# Patient Record
Sex: FEMALE | Race: WHITE | Employment: PART TIME | URBAN - NONMETROPOLITAN AREA
[De-identification: names, ages, dates, MRNs, and addresses within clinical notes are randomized per-mention and may not be internally consistent; named-entity substitution may affect disease eponyms.]

---

## 2017-01-01 ENCOUNTER — TELEPHONE (OUTPATIENT)
Dept: URGENT CARE | Age: 37
End: 2017-01-01

## 2017-02-23 RX ORDER — NORGESTIMATE AND ETHINYL ESTRADIOL 7DAYSX3 28
1 KIT ORAL DAILY
Qty: 28 TABLET | Refills: 0 | Status: SHIPPED | OUTPATIENT
Start: 2017-02-23 | End: 2017-04-03 | Stop reason: SDUPTHER

## 2017-04-03 RX ORDER — NORGESTIMATE AND ETHINYL ESTRADIOL 7DAYSX3 28
KIT ORAL
Qty: 28 TABLET | Refills: 0 | Status: SHIPPED | OUTPATIENT
Start: 2017-04-03 | End: 2017-05-03 | Stop reason: SDUPTHER

## 2017-05-03 ENCOUNTER — OFFICE VISIT (OUTPATIENT)
Dept: OBGYN | Age: 37
End: 2017-05-03
Payer: COMMERCIAL

## 2017-05-03 VITALS
HEART RATE: 95 BPM | DIASTOLIC BLOOD PRESSURE: 90 MMHG | WEIGHT: 118 LBS | SYSTOLIC BLOOD PRESSURE: 133 MMHG | BODY MASS INDEX: 21.71 KG/M2 | HEIGHT: 62 IN

## 2017-05-03 DIAGNOSIS — F41.9 ANXIETY: ICD-10-CM

## 2017-05-03 DIAGNOSIS — Z01.419 WELL WOMAN EXAM WITH ROUTINE GYNECOLOGICAL EXAM: Primary | ICD-10-CM

## 2017-05-03 DIAGNOSIS — Z12.4 CERVICAL CANCER SCREENING: ICD-10-CM

## 2017-05-03 PROCEDURE — 99395 PREV VISIT EST AGE 18-39: CPT | Performed by: NURSE PRACTITIONER

## 2017-05-03 RX ORDER — HYDROXYZINE PAMOATE 25 MG/1
25 CAPSULE ORAL 3 TIMES DAILY PRN
Qty: 60 CAPSULE | Refills: 3 | Status: SHIPPED | OUTPATIENT
Start: 2017-05-03 | End: 2017-06-02

## 2017-05-03 RX ORDER — NORGESTIMATE AND ETHINYL ESTRADIOL 7DAYSX3 28
KIT ORAL
Qty: 28 TABLET | Refills: 11 | Status: SHIPPED | OUTPATIENT
Start: 2017-05-03 | End: 2018-04-26 | Stop reason: SDUPTHER

## 2017-05-03 ASSESSMENT — ENCOUNTER SYMPTOMS
EYES NEGATIVE: 1
RESPIRATORY NEGATIVE: 1
GASTROINTESTINAL NEGATIVE: 1

## 2017-07-06 ENCOUNTER — OFFICE VISIT (OUTPATIENT)
Dept: URGENT CARE | Age: 37
End: 2017-07-06
Payer: COMMERCIAL

## 2017-07-06 VITALS
WEIGHT: 120 LBS | DIASTOLIC BLOOD PRESSURE: 90 MMHG | HEART RATE: 88 BPM | BODY MASS INDEX: 22.08 KG/M2 | HEIGHT: 62 IN | OXYGEN SATURATION: 100 % | TEMPERATURE: 98.8 F | RESPIRATION RATE: 20 BRPM | SYSTOLIC BLOOD PRESSURE: 135 MMHG

## 2017-07-06 DIAGNOSIS — J06.9 VIRAL URI: Primary | ICD-10-CM

## 2017-07-06 DIAGNOSIS — J02.9 SORE THROAT: ICD-10-CM

## 2017-07-06 DIAGNOSIS — R09.82 POST-NASAL DRAINAGE: ICD-10-CM

## 2017-07-06 DIAGNOSIS — R09.81 NASAL CONGESTION: ICD-10-CM

## 2017-07-06 LAB — S PYO AG THROAT QL: NORMAL

## 2017-07-06 PROCEDURE — 87880 STREP A ASSAY W/OPTIC: CPT | Performed by: NURSE PRACTITIONER

## 2017-07-06 PROCEDURE — 99213 OFFICE O/P EST LOW 20 MIN: CPT | Performed by: NURSE PRACTITIONER

## 2017-07-06 RX ORDER — FLUTICASONE PROPIONATE 50 MCG
1 SPRAY, SUSPENSION (ML) NASAL DAILY
Qty: 1 BOTTLE | Refills: 0 | Status: SHIPPED | OUTPATIENT
Start: 2017-07-06 | End: 2017-11-15

## 2017-08-23 PROBLEM — K21.9 GASTROESOPHAGEAL REFLUX DISEASE WITHOUT ESOPHAGITIS: Status: ACTIVE | Noted: 2017-08-23

## 2017-08-23 PROBLEM — Z00.00 ANNUAL PHYSICAL EXAM: Status: ACTIVE | Noted: 2017-08-23

## 2017-08-23 PROBLEM — F41.1 GENERALIZED ANXIETY DISORDER: Status: ACTIVE | Noted: 2017-08-23

## 2017-11-15 ENCOUNTER — OFFICE VISIT (OUTPATIENT)
Dept: INTERNAL MEDICINE | Age: 37
End: 2017-11-15
Payer: COMMERCIAL

## 2017-11-15 VITALS
HEIGHT: 62 IN | WEIGHT: 118 LBS | TEMPERATURE: 98.2 F | SYSTOLIC BLOOD PRESSURE: 156 MMHG | BODY MASS INDEX: 21.71 KG/M2 | DIASTOLIC BLOOD PRESSURE: 88 MMHG | HEART RATE: 97 BPM | OXYGEN SATURATION: 99 %

## 2017-11-15 DIAGNOSIS — I10 ESSENTIAL HYPERTENSION: Primary | ICD-10-CM

## 2017-11-15 DIAGNOSIS — R53.83 FATIGUE, UNSPECIFIED TYPE: ICD-10-CM

## 2017-11-15 PROCEDURE — 99213 OFFICE O/P EST LOW 20 MIN: CPT | Performed by: NURSE PRACTITIONER

## 2017-11-15 RX ORDER — BUSPIRONE HYDROCHLORIDE 10 MG/1
10 TABLET ORAL 3 TIMES DAILY
Qty: 90 TABLET | Refills: 0 | Status: SHIPPED | OUTPATIENT
Start: 2017-11-15 | End: 2017-12-21 | Stop reason: SDUPTHER

## 2017-11-15 RX ORDER — LISINOPRIL 5 MG/1
5 TABLET ORAL DAILY
Qty: 30 TABLET | Refills: 0 | Status: SHIPPED | OUTPATIENT
Start: 2017-11-15 | End: 2017-12-15 | Stop reason: SDUPTHER

## 2017-11-15 ASSESSMENT — ENCOUNTER SYMPTOMS
RHINORRHEA: 0
BACK PAIN: 0
COUGH: 0
PHOTOPHOBIA: 0
NAUSEA: 0
VOICE CHANGE: 0
VOMITING: 0
SHORTNESS OF BREATH: 0
COLOR CHANGE: 0

## 2017-11-15 NOTE — PROGRESS NOTES
28 tablet 11     No current facility-administered medications for this visit. No Known Allergies    Health Maintenance   Topic Date Due    HIV screen  07/17/1994    DTaP/Tdap/Td vaccine (1 - Tdap) 07/17/1998    Flu vaccine (1) 09/01/2017    Cervical cancer screen  05/10/2020       Subjective:      Review of Systems   Constitutional: Negative for chills and fever. HENT: Negative for ear pain, hearing loss, rhinorrhea and voice change. Eyes: Negative for photophobia and visual disturbance. Respiratory: Negative for cough and shortness of breath. Cardiovascular: Negative for chest pain and palpitations. Gastrointestinal: Negative for nausea and vomiting. Endocrine: Negative. Negative for cold intolerance and heat intolerance. Genitourinary: Negative for difficulty urinating and flank pain. Musculoskeletal: Negative for back pain and neck pain. Skin: Negative for color change and rash. Allergic/Immunologic: Negative for environmental allergies and food allergies. Neurological: Negative for dizziness, speech difficulty and headaches. Hematological: Does not bruise/bleed easily. Psychiatric/Behavioral: Negative for sleep disturbance and suicidal ideas. Objective:     Physical Exam   Constitutional: She is oriented to person, place, and time. She appears well-developed and well-nourished. HENT:   Head: Atraumatic. Right Ear: External ear normal.   Left Ear: External ear normal.   Nose: Nose normal.   Mouth/Throat: Oropharynx is clear and moist.   Eyes: Conjunctivae are normal. Pupils are equal, round, and reactive to light. Neck: Normal range of motion. Neck supple. Cardiovascular: Normal rate, regular rhythm, S1 normal, S2 normal and normal heart sounds. Pulmonary/Chest: Effort normal and breath sounds normal.   Abdominal: Soft. Bowel sounds are normal.   Musculoskeletal: Normal range of motion. Neurological: She is alert and oriented to person, place, and time. Skin: Skin is warm and dry. Psychiatric: She has a normal mood and affect. Her behavior is normal.   Nursing note and vitals reviewed. BP (!) 156/88 (Site: Left Arm)   Pulse 97   Temp 98.2 °F (36.8 °C)   Ht 5' 2\" (1.575 m)   Wt 118 lb (53.5 kg)   SpO2 99%   BMI 21.58 kg/m²     Assessment:      1. Essential hypertension  Comprehensive Metabolic Panel    Lipid Panel    Vitamin D 25 Hydroxy    Vitamin B12    TSH without Reflex   2. Fatigue, unspecified type  Comprehensive Metabolic Panel    Lipid Panel    Vitamin D 25 Hydroxy    Vitamin B12    TSH without Reflex       Plan:      I like start patient on 5 mg lisinopril daily. I will also give her 10 mg to that she may take up to 3 times daily as needed for anxiety. I would like to see her back in one month for blood pressure recheck and routine physical. I will place order for labs that she may have done at her convenience. She is to keep a diary of her blood pressure. Patient verbalizes understanding and is in agreement with this plan. Patient given educational materials - see patient instructions. Discussed use, benefit, and side effects of prescribed medications. All patient questions answered. Pt voiced understanding. Reviewed health maintenance. Instructed to continue current medications, diet and exercise. Patient agreed with treatment plan. Follow up as directed.      MEDICATIONS:  Orders Placed This Encounter   Medications    lisinopril (PRINIVIL;ZESTRIL) 5 MG tablet     Sig: Take 1 tablet by mouth daily     Dispense:  30 tablet     Refill:  0    busPIRone (BUSPAR) 10 MG tablet     Sig: Take 1 tablet by mouth 3 times daily     Dispense:  90 tablet     Refill:  0         ORDERS:  Orders Placed This Encounter   Procedures    Comprehensive Metabolic Panel    Lipid Panel    Vitamin D 25 Hydroxy    Vitamin B12    TSH without Reflex       Follow-up:  Return in about 1 month (around 12/15/2017) for recheck BP and physical.    PATIENT

## 2017-12-15 DIAGNOSIS — R53.83 FATIGUE, UNSPECIFIED TYPE: ICD-10-CM

## 2017-12-15 DIAGNOSIS — I10 ESSENTIAL HYPERTENSION: ICD-10-CM

## 2017-12-15 LAB
ALBUMIN SERPL-MCNC: 4.2 G/DL (ref 3.5–5.2)
ALP BLD-CCNC: 52 U/L (ref 35–104)
ALT SERPL-CCNC: 38 U/L (ref 5–33)
ANION GAP SERPL CALCULATED.3IONS-SCNC: 13 MMOL/L (ref 7–19)
AST SERPL-CCNC: 24 U/L (ref 5–32)
BILIRUB SERPL-MCNC: 0.4 MG/DL (ref 0.2–1.2)
BUN BLDV-MCNC: 16 MG/DL (ref 6–20)
CALCIUM SERPL-MCNC: 9.2 MG/DL (ref 8.6–10)
CHLORIDE BLD-SCNC: 103 MMOL/L (ref 98–111)
CHOLESTEROL, TOTAL: 202 MG/DL (ref 160–199)
CO2: 25 MMOL/L (ref 22–29)
CREAT SERPL-MCNC: 0.7 MG/DL (ref 0.5–0.9)
GFR NON-AFRICAN AMERICAN: >60
GLUCOSE BLD-MCNC: 97 MG/DL (ref 74–109)
HDLC SERPL-MCNC: 76 MG/DL (ref 65–121)
LDL CHOLESTEROL CALCULATED: 102 MG/DL
POTASSIUM SERPL-SCNC: 4.3 MMOL/L (ref 3.5–5)
SODIUM BLD-SCNC: 141 MMOL/L (ref 136–145)
TOTAL PROTEIN: 6.6 G/DL (ref 6.6–8.7)
TRIGL SERPL-MCNC: 118 MG/DL (ref 0–149)
TSH SERPL DL<=0.05 MIU/L-ACNC: 2.84 UIU/ML (ref 0.27–4.2)
VITAMIN B-12: 987 PG/ML (ref 211–946)
VITAMIN D 25-HYDROXY: 56.9 NG/ML

## 2017-12-15 RX ORDER — LISINOPRIL 5 MG/1
5 TABLET ORAL DAILY
Qty: 30 TABLET | Refills: 0 | Status: SHIPPED | OUTPATIENT
Start: 2017-12-15 | End: 2017-12-21 | Stop reason: SDUPTHER

## 2017-12-18 ENCOUNTER — TELEPHONE (OUTPATIENT)
Dept: INTERNAL MEDICINE | Age: 37
End: 2017-12-18

## 2017-12-18 RX ORDER — AMOXICILLIN 500 MG/1
500 CAPSULE ORAL 3 TIMES DAILY
Qty: 24 CAPSULE | Refills: 0 | Status: SHIPPED | OUTPATIENT
Start: 2017-12-18 | End: 2017-12-21 | Stop reason: CLARIF

## 2017-12-18 NOTE — TELEPHONE ENCOUNTER
Can we call something in for a sinus infection.   If we send  a antibiotic she will need something for infection

## 2017-12-21 ENCOUNTER — OFFICE VISIT (OUTPATIENT)
Dept: INTERNAL MEDICINE | Age: 37
End: 2017-12-21
Payer: COMMERCIAL

## 2017-12-21 VITALS
HEIGHT: 62 IN | SYSTOLIC BLOOD PRESSURE: 118 MMHG | DIASTOLIC BLOOD PRESSURE: 72 MMHG | WEIGHT: 115.2 LBS | BODY MASS INDEX: 21.2 KG/M2 | HEART RATE: 93 BPM | OXYGEN SATURATION: 99 %

## 2017-12-21 DIAGNOSIS — Z00.00 ANNUAL PHYSICAL EXAM: Primary | ICD-10-CM

## 2017-12-21 DIAGNOSIS — I10 ESSENTIAL HYPERTENSION: ICD-10-CM

## 2017-12-21 DIAGNOSIS — F41.1 GENERALIZED ANXIETY DISORDER: ICD-10-CM

## 2017-12-21 PROCEDURE — 99395 PREV VISIT EST AGE 18-39: CPT | Performed by: INTERNAL MEDICINE

## 2017-12-21 RX ORDER — BUSPIRONE HYDROCHLORIDE 10 MG/1
10 TABLET ORAL 3 TIMES DAILY
Qty: 90 TABLET | Refills: 3 | Status: SHIPPED | OUTPATIENT
Start: 2017-12-21 | End: 2018-10-12

## 2017-12-21 RX ORDER — LISINOPRIL 5 MG/1
5 TABLET ORAL DAILY
Qty: 30 TABLET | Refills: 11 | Status: SHIPPED | OUTPATIENT
Start: 2017-12-21 | End: 2018-10-12 | Stop reason: ALTCHOICE

## 2017-12-21 ASSESSMENT — ENCOUNTER SYMPTOMS
ABDOMINAL PAIN: 0
TROUBLE SWALLOWING: 0
COUGH: 0
CONSTIPATION: 0
WHEEZING: 0
NAUSEA: 0
EYE PAIN: 0
SINUS PRESSURE: 0
BLOOD IN STOOL: 0
VOICE CHANGE: 0
EYE REDNESS: 0
SORE THROAT: 0
CHEST TIGHTNESS: 0
COLOR CHANGE: 0
DIARRHEA: 0
VOMITING: 0

## 2017-12-21 NOTE — PROGRESS NOTES
Age of Onset    Colon Cancer Father        No past surgical history on file. Lab Review   Orders Only on 12/15/2017   Component Date Value    Sodium 12/15/2017 141     Potassium 12/15/2017 4.3     Chloride 12/15/2017 103     CO2 12/15/2017 25     Anion Gap 12/15/2017 13     Glucose 12/15/2017 97     BUN 12/15/2017 16     CREATININE 12/15/2017 0.7     GFR Non- 12/15/2017 >60     Calcium 12/15/2017 9.2     Total Protein 12/15/2017 6.6     Alb 12/15/2017 4.2     Total Bilirubin 12/15/2017 0.4     Alkaline Phosphatase 12/15/2017 52     ALT 12/15/2017 38*    AST 12/15/2017 24     Cholesterol, Total 12/15/2017 202*    Triglycerides 12/15/2017 118     HDL 12/15/2017 76     LDL Calculated 12/15/2017 102     Vit D, 25-Hydroxy 12/15/2017 56.9     Vitamin B-12 12/15/2017 987*    TSH 12/15/2017 2.840          Review of Systems   Constitutional: Positive for fatigue. Negative for chills and fever. HENT: Negative for congestion, ear pain, postnasal drip, sinus pressure, sore throat, trouble swallowing and voice change. Eyes: Negative for pain, redness and visual disturbance. Respiratory: Negative for cough, chest tightness and wheezing. Cardiovascular: Negative for chest pain, palpitations and leg swelling. Gastrointestinal: Negative for abdominal pain, blood in stool, constipation, diarrhea, nausea and vomiting. Endocrine: Negative for polydipsia and polyuria. Genitourinary: Negative for dysuria, enuresis, flank pain, frequency and urgency. Musculoskeletal: Negative for arthralgias, gait problem and joint swelling. Skin: Negative for color change and rash. Neurological: Negative for dizziness, tremors, syncope, facial asymmetry, speech difficulty, weakness, numbness and headaches. Psychiatric/Behavioral: Negative for agitation, behavioral problems, confusion, sleep disturbance and suicidal ideas. The patient is not nervous/anxious.            Vitals:    12/21/17 1002   BP: 118/72   Pulse: 93   SpO2: 99%   Weight: 115 lb 3.2 oz (52.3 kg)   Height: 5' 2\" (1.575 m)      Wt Readings from Last 3 Encounters:   12/21/17 115 lb 3.2 oz (52.3 kg)   11/15/17 118 lb (53.5 kg)   07/06/17 120 lb (54.4 kg)   Body mass index is 21.07 kg/m². BP Readings from Last 3 Encounters:   12/21/17 118/72   11/15/17 (!) 156/88   07/06/17 (!) 135/90       Physical Exam   Constitutional: She is oriented to person, place, and time. She appears well-developed. No distress. HENT:   Head: Normocephalic and atraumatic. Right Ear: External ear normal.   Left Ear: External ear normal.   Mouth/Throat: Oropharynx is clear and moist.   Eyes: Conjunctivae and EOM are normal. Pupils are equal, round, and reactive to light. No scleral icterus. Neck: No JVD present. No thyromegaly present. Cardiovascular: Normal rate, regular rhythm and normal heart sounds. No murmur heard. Pulmonary/Chest: Effort normal and breath sounds normal. No stridor. No respiratory distress. She exhibits no tenderness. Abdominal: Soft. Bowel sounds are normal. She exhibits no distension and no mass. There is no tenderness. Genitourinary: No breast swelling, tenderness, discharge or bleeding. Pelvic exam was performed with patient supine. Genitourinary Comments: Breasts bilaterally symmetric/ no nipple discharge  No axillary lymphadenopathy   Musculoskeletal: She exhibits no edema or tenderness. Lymphadenopathy:     She has no cervical adenopathy. Neurological: She is alert and oriented to person, place, and time. No cranial nerve deficit. Coordination normal.   Skin: Skin is warm. No rash noted. No erythema. Psychiatric: She has a normal mood and affect. Her behavior is normal.         ASSESSMENT/PLAN    Annual physical exam  All lab results reviewed with the patient, they all in normal range, her LDL is 102/was 98 year ago.  Healthy at lower sed rate fat diet is suggested    Essential hypertension  Decrease lisinopril to 2.5 mg daily  Bp readings to me in 6 weeks    Generalized anxiety disorder- continue BuSpar 10 mg every 12 when necessary        Orders Placed This Encounter   Procedures    Lipid Panel    Comprehensive Metabolic Panel    CBC Auto Differential    TSH without Reflex    Urinalysis     New Prescriptions    No medications on file      There are no Patient Instructions on file for this visit. Return in about 1 year (around 12/21/2018) for Annual Physical.   EMR Dragon/transcription disclaimer:Significant part of this  encounter note is electronic transcription/translation of spoken language to printed text. The electronic translation of spoken language may be erroneous, or at times, nonsensical words or phrases may be inadvertently transcribed.  Although I have reviewed the note for such errors, some may still exist.

## 2018-05-24 ENCOUNTER — TELEPHONE (OUTPATIENT)
Dept: INTERNAL MEDICINE | Age: 38
End: 2018-05-24

## 2018-05-24 RX ORDER — FLUCONAZOLE 100 MG/1
100 TABLET ORAL DAILY
Qty: 3 TABLET | Refills: 0 | Status: SHIPPED | OUTPATIENT
Start: 2018-05-24 | End: 2018-05-27

## 2018-05-24 RX ORDER — NORGESTIMATE AND ETHINYL ESTRADIOL 7DAYSX3 28
KIT ORAL
Qty: 28 TABLET | Refills: 0 | Status: SHIPPED | OUTPATIENT
Start: 2018-05-24 | End: 2018-09-28

## 2018-09-24 ENCOUNTER — TELEPHONE (OUTPATIENT)
Dept: OBGYN | Age: 38
End: 2018-09-24

## 2018-09-26 PROBLEM — Z00.00 ANNUAL PHYSICAL EXAM: Status: RESOLVED | Noted: 2017-08-23 | Resolved: 2018-09-26

## 2018-09-28 ENCOUNTER — OFFICE VISIT (OUTPATIENT)
Dept: OBGYN | Age: 38
End: 2018-09-28
Payer: COMMERCIAL

## 2018-09-28 VITALS
TEMPERATURE: 100.7 F | SYSTOLIC BLOOD PRESSURE: 119 MMHG | WEIGHT: 118 LBS | HEIGHT: 62 IN | HEART RATE: 111 BPM | DIASTOLIC BLOOD PRESSURE: 82 MMHG | BODY MASS INDEX: 21.71 KG/M2

## 2018-09-28 DIAGNOSIS — N91.2 AMENORRHEA: Primary | ICD-10-CM

## 2018-09-28 LAB
CONTROL: ABNORMAL
PREGNANCY TEST URINE, POC: POSITIVE

## 2018-09-28 PROCEDURE — 81025 URINE PREGNANCY TEST: CPT | Performed by: NURSE PRACTITIONER

## 2018-09-28 PROCEDURE — 99213 OFFICE O/P EST LOW 20 MIN: CPT | Performed by: NURSE PRACTITIONER

## 2018-09-28 RX ORDER — BIOTIN 5 MG
TABLET ORAL
COMMUNITY
End: 2018-10-12

## 2018-09-28 ASSESSMENT — ENCOUNTER SYMPTOMS
DIARRHEA: 0
RESPIRATORY NEGATIVE: 1
ALLERGIC/IMMUNOLOGIC NEGATIVE: 1
EYES NEGATIVE: 1
CONSTIPATION: 0
GASTROINTESTINAL NEGATIVE: 1

## 2018-09-28 NOTE — PROGRESS NOTES
Endocrine: Negative. Genitourinary: Positive for menstrual problem (missed period x1). Negative for frequency and urgency. Musculoskeletal: Negative. Skin: Negative. Allergic/Immunologic: Negative. Neurological: Negative. Hematological: Negative. Psychiatric/Behavioral: Negative. All other systems reviewed and are negative. Physical Exam   Constitutional: She is oriented to person, place, and time. She appears well-developed and well-nourished. Musculoskeletal: Normal range of motion. Neurological: She is alert and oriented to person, place, and time. Skin: Skin is warm and dry. Psychiatric: She has a normal mood and affect. Nursing note and vitals reviewed. Diagnosis Orders   1. Amenorrhea  POCT urine pregnancy    US OB TRANSVAGINAL       MEDICATIONS:  No orders of the defined types were placed in this encounter. ORDERS:  Orders Placed This Encounter   Procedures    US OB TRANSVAGINAL    POCT urine pregnancy       PLAN:  Will schedule viability u/s in 2 weeks  Discussed AMA in pregnancy  Continue PNV and stop Lisinopril    There are no Patient Instructions on file for this visit.

## 2018-10-12 ENCOUNTER — INITIAL PRENATAL (OUTPATIENT)
Dept: OBGYN | Age: 38
End: 2018-10-12

## 2018-10-12 ENCOUNTER — HOSPITAL ENCOUNTER (OUTPATIENT)
Dept: ULTRASOUND IMAGING | Age: 38
Discharge: HOME | End: 2018-10-12
Payer: COMMERCIAL

## 2018-10-12 VITALS
DIASTOLIC BLOOD PRESSURE: 81 MMHG | BODY MASS INDEX: 22.13 KG/M2 | WEIGHT: 121 LBS | SYSTOLIC BLOOD PRESSURE: 128 MMHG | HEART RATE: 104 BPM

## 2018-10-12 DIAGNOSIS — O28.9 ABNORMAL FINDING ON ANTENATAL SCREEN: ICD-10-CM

## 2018-10-12 DIAGNOSIS — O09.91 HIGH-RISK PREGNANCY IN FIRST TRIMESTER: Primary | ICD-10-CM

## 2018-10-12 DIAGNOSIS — N91.2 AMENORRHEA: ICD-10-CM

## 2018-10-12 DIAGNOSIS — O09.521 ELDERLY MULTIGRAVIDA IN FIRST TRIMESTER: ICD-10-CM

## 2018-10-12 DIAGNOSIS — O09.91 HIGH-RISK PREGNANCY IN FIRST TRIMESTER: ICD-10-CM

## 2018-10-12 LAB
ABO/RH: NORMAL
ANTIBODY SCREEN: NORMAL

## 2018-10-12 PROCEDURE — 0500F INITIAL PRENATAL CARE VISIT: CPT | Performed by: NURSE PRACTITIONER

## 2018-10-12 PROCEDURE — 76801 OB US < 14 WKS SINGLE FETUS: CPT

## 2018-10-12 NOTE — PROGRESS NOTES
Pt presents today for routine prenatal exam. She denies any vaginal bleeding, leaking, or cramping. Pt states she has felt full/bloated lately-is wanting to discuss digestive enzymes.

## 2018-10-12 NOTE — PROGRESS NOTES
Pt presents for Nob appt. Sending down for NOB labs today. Had viability u/s and it shows good FHTs but irregular sac and implantation bleed. Discussed results with pt understanding. Denies any problems with cramping or bleeding. Pt is us tech and very concerned. Will schedule another u/s in 2 weeks. She has stopped Lisinopril, questions regarding digestive enzymes. Will hold for now and just continue Miralax and increase hydration. AMA in pregnancy, will consider FFDNA for next appt. Plan of care was discussed with patient. Patient was encouraged to adhere to a well-balanced diet, including increasing water intake and limiting excessive caffeine and salt. The benefits of exercise were discussed; however she was advised against heavy lifting, sit-ups and abdominal crunches. A list of safe OTC medications was provided and discussed. The patient was cautioned against the use of tanning beds, hot tubs, saunas, and x-rays. Avoidance of tobacco, alcohol and illicit drugs was also discussed due to harmful effects on the fetus and increased risks associated with pregnancy. Certain labs and ultrasounds are required at certain times during pregnancy but others are optional, including the serum integrated screen/Bliss/AFP/ Panorama, and other genetic testing. The patient was encouraged to attend childbirth classes and general hospital information was provided based on patients hospital of choice.

## 2018-10-14 LAB — URINE CULTURE, ROUTINE: NORMAL

## 2018-10-15 LAB
BASOPHILS ABSOLUTE: 0.1 K/UL (ref 0–0.2)
BASOPHILS RELATIVE PERCENT: 0.6 % (ref 0–1)
EOSINOPHILS ABSOLUTE: 0.2 K/UL (ref 0–0.6)
EOSINOPHILS RELATIVE PERCENT: 1.1 % (ref 0–5)
HCT VFR BLD CALC: 39.9 % (ref 37–47)
HEMOGLOBIN: 12.8 G/DL (ref 12–16)
HEPATITIS B SURFACE ANTIGEN INTERPRETATION: ABNORMAL
LYMPHOCYTES ABSOLUTE: 2.7 K/UL (ref 1.1–4.5)
LYMPHOCYTES RELATIVE PERCENT: 18.9 % (ref 20–40)
MCH RBC QN AUTO: 30.3 PG (ref 27–31)
MCHC RBC AUTO-ENTMCNC: 32.1 G/DL (ref 33–37)
MCV RBC AUTO: 94.5 FL (ref 81–99)
MONOCYTES ABSOLUTE: 0.6 K/UL (ref 0–0.9)
MONOCYTES RELATIVE PERCENT: 4.5 % (ref 0–10)
NEUTROPHILS ABSOLUTE: 10.7 K/UL (ref 1.5–7.5)
NEUTROPHILS RELATIVE PERCENT: 74.6 % (ref 50–65)
PDW BLD-RTO: 12.4 % (ref 11.5–14.5)
PLATELET # BLD: 315 K/UL (ref 130–400)
PMV BLD AUTO: 9.9 FL (ref 9.4–12.3)
RAPID HIV 1&2: ABNORMAL
RBC # BLD: 4.22 M/UL (ref 4.2–5.4)
RPR: ABNORMAL
RUBELLA ANTIBODY IGG: REACTIVE
WBC # BLD: 14.3 K/UL (ref 4.8–10.8)

## 2018-10-19 LAB — VZV IGG SER QL IA: 2.23

## 2018-10-25 ENCOUNTER — HOSPITAL ENCOUNTER (OUTPATIENT)
Dept: ULTRASOUND IMAGING | Age: 38
Discharge: HOME | End: 2018-10-25
Payer: COMMERCIAL

## 2018-10-25 ENCOUNTER — ROUTINE PRENATAL (OUTPATIENT)
Dept: OBGYN | Age: 38
End: 2018-10-25

## 2018-10-25 VITALS
BODY MASS INDEX: 21.77 KG/M2 | HEART RATE: 107 BPM | SYSTOLIC BLOOD PRESSURE: 117 MMHG | WEIGHT: 119 LBS | DIASTOLIC BLOOD PRESSURE: 77 MMHG

## 2018-10-25 DIAGNOSIS — O09.521 ELDERLY MULTIGRAVIDA IN FIRST TRIMESTER: ICD-10-CM

## 2018-10-25 DIAGNOSIS — O16.1: ICD-10-CM

## 2018-10-25 DIAGNOSIS — O09.91 HIGH-RISK PREGNANCY IN FIRST TRIMESTER: ICD-10-CM

## 2018-10-25 DIAGNOSIS — O09.91 HIGH-RISK PREGNANCY IN FIRST TRIMESTER: Primary | ICD-10-CM

## 2018-10-25 DIAGNOSIS — O28.9 ABNORMAL FINDING ON ANTENATAL SCREEN: ICD-10-CM

## 2018-10-25 DIAGNOSIS — Z3A.09 9 WEEKS GESTATION OF PREGNANCY: ICD-10-CM

## 2018-10-25 PROCEDURE — 76817 TRANSVAGINAL US OBSTETRIC: CPT

## 2018-10-25 PROCEDURE — 0502F SUBSEQUENT PRENATAL CARE: CPT | Performed by: NURSE PRACTITIONER

## 2018-10-25 NOTE — PATIENT INSTRUCTIONS
Patient Education        Weeks 10 to 14 of Your Pregnancy: Care Instructions  Your Care Instructions    By weeks 10 to 14 of your pregnancy, the placenta has formed inside your uterus. It is possible to hear your baby's heartbeat with a special ultrasound device. Your baby's eyes can and do move. The arms and legs can bend. This is a good time to think about testing for birth defects. There are two types of tests: screening and diagnostic. Screening tests show the chance that a baby has a certain birth defect. They can't tell you for sure that your baby has a problem. Diagnostic tests show if a baby has a certain birth defect. It's your choice whether to have these tests. You and your partner can talk to your doctor or midwife about birth defects tests. Follow-up care is a key part of your treatment and safety. Be sure to make and go to all appointments, and call your doctor if you are having problems. It's also a good idea to know your test results and keep a list of the medicines you take. How can you care for yourself at home? Decide about tests  · You can have screening tests and diagnostic tests to check for birth defects. The decision to have a test for birth defects is personal. Think about your age, your chance of passing on a family disease, your need to know about any problems, and what you might do after you have the test results. ¨ Triple or quadruple (quad) blood tests. These screening tests can be done between 15 and 20 weeks of pregnancy. They check the amounts of three or four substances in your blood. The doctor looks at these test results, along with your age and other factors, to find out the chance that your baby may have certain problems. ¨ Amniocentesis. This diagnostic test is used to look for chromosomal problems in the baby's cells.  It can be done between 15 and 20 weeks of pregnancy, usually around week 16.  ¨ Nuchal translucency test. This test uses ultrasound to measure the your chest and breasts may show more. · Don't worry about \"toughening'\" your nipples. Breastfeeding will naturally do this. Where can you learn more? Go to [email or web address]://[email or web address].[email or web address] org and sign in to your unbound technologies account. Enter J315 in the Trendzo box to learn more about \"Weeks 10 to 14 of Your Pregnancy: Care Instructions. \"     If you do not have an account, please click on the \"Sign Up Now\" link. Current as of: November 21, 2017  Content Version: 11.7  © 7107-8460 [employer]. Care instructions adapted under license by [facility] ([facility]). If you have questions about a medical condition or this instruction, always ask your healthcare professional. [address] any warranty or liability for your use of this information.

## 2018-10-25 NOTE — PROGRESS NOTES
Pt presents today for routine prenatal exam. She denies any vaginal bleeding, leaking, or cramping. Pt had repeat US today for irregular sac.

## 2018-11-02 ENCOUNTER — ROUTINE PRENATAL (OUTPATIENT)
Dept: OBGYN | Age: 38
End: 2018-11-02

## 2018-11-02 VITALS
DIASTOLIC BLOOD PRESSURE: 85 MMHG | BODY MASS INDEX: 22.31 KG/M2 | HEART RATE: 91 BPM | SYSTOLIC BLOOD PRESSURE: 122 MMHG | WEIGHT: 122 LBS

## 2018-11-02 DIAGNOSIS — O09.91 HIGH-RISK PREGNANCY IN FIRST TRIMESTER: Primary | ICD-10-CM

## 2018-11-02 DIAGNOSIS — Z3A.11 11 WEEKS GESTATION OF PREGNANCY: ICD-10-CM

## 2018-11-02 DIAGNOSIS — O09.521 ELDERLY MULTIGRAVIDA IN FIRST TRIMESTER: ICD-10-CM

## 2018-11-02 PROCEDURE — 0502F SUBSEQUENT PRENATAL CARE: CPT | Performed by: NURSE PRACTITIONER

## 2018-11-08 ENCOUNTER — TELEPHONE (OUTPATIENT)
Dept: OBGYN | Age: 38
End: 2018-11-08

## 2018-12-14 ENCOUNTER — OFFICE VISIT (OUTPATIENT)
Dept: INTERNAL MEDICINE | Age: 38
End: 2018-12-14
Payer: COMMERCIAL

## 2018-12-14 VITALS
WEIGHT: 127.2 LBS | TEMPERATURE: 97.9 F | HEIGHT: 62 IN | SYSTOLIC BLOOD PRESSURE: 118 MMHG | HEART RATE: 87 BPM | OXYGEN SATURATION: 99 % | BODY MASS INDEX: 23.41 KG/M2 | DIASTOLIC BLOOD PRESSURE: 64 MMHG

## 2018-12-14 DIAGNOSIS — Z3A.17 17 WEEKS GESTATION OF PREGNANCY: ICD-10-CM

## 2018-12-14 DIAGNOSIS — R05.9 COUGH: ICD-10-CM

## 2018-12-14 DIAGNOSIS — J34.89 SINUS PRESSURE: ICD-10-CM

## 2018-12-14 DIAGNOSIS — J01.00 ACUTE NON-RECURRENT MAXILLARY SINUSITIS: Primary | ICD-10-CM

## 2018-12-14 PROCEDURE — 99213 OFFICE O/P EST LOW 20 MIN: CPT | Performed by: INTERNAL MEDICINE

## 2018-12-14 RX ORDER — CEFUROXIME AXETIL 250 MG/1
250 TABLET ORAL 2 TIMES DAILY
Qty: 16 TABLET | Refills: 0 | Status: SHIPPED | OUTPATIENT
Start: 2018-12-14 | End: 2018-12-22

## 2018-12-14 ASSESSMENT — ENCOUNTER SYMPTOMS
SINUS PRESSURE: 1
SORE THROAT: 1
CHEST TIGHTNESS: 0
WHEEZING: 0
SINUS PAIN: 1
COUGH: 1
ABDOMINAL PAIN: 0
CONSTIPATION: 0

## 2019-02-01 ENCOUNTER — OFFICE VISIT (OUTPATIENT)
Dept: INTERNAL MEDICINE | Age: 39
End: 2019-02-01
Payer: COMMERCIAL

## 2019-02-01 VITALS
HEART RATE: 103 BPM | OXYGEN SATURATION: 99 % | DIASTOLIC BLOOD PRESSURE: 80 MMHG | BODY MASS INDEX: 24.88 KG/M2 | HEIGHT: 62 IN | TEMPERATURE: 98.2 F | WEIGHT: 135.2 LBS | SYSTOLIC BLOOD PRESSURE: 122 MMHG

## 2019-02-01 DIAGNOSIS — J02.9 SORE THROAT: Primary | ICD-10-CM

## 2019-02-01 DIAGNOSIS — R09.81 CONGESTION OF NASAL SINUS: ICD-10-CM

## 2019-02-01 DIAGNOSIS — R52 BODY ACHES: ICD-10-CM

## 2019-02-01 DIAGNOSIS — R05.9 COUGH: ICD-10-CM

## 2019-02-01 DIAGNOSIS — J34.89 STUFFY AND RUNNY NOSE: ICD-10-CM

## 2019-02-01 LAB
INFLUENZA A ANTIBODY: NORMAL
INFLUENZA B ANTIBODY: NORMAL
S PYO AG THROAT QL: NORMAL

## 2019-02-01 PROCEDURE — 99213 OFFICE O/P EST LOW 20 MIN: CPT | Performed by: PHYSICIAN ASSISTANT

## 2019-02-01 PROCEDURE — 87880 STREP A ASSAY W/OPTIC: CPT | Performed by: PHYSICIAN ASSISTANT

## 2019-02-01 PROCEDURE — 87804 INFLUENZA ASSAY W/OPTIC: CPT | Performed by: PHYSICIAN ASSISTANT

## 2019-02-01 RX ORDER — CEFUROXIME AXETIL 250 MG/1
250 TABLET ORAL 2 TIMES DAILY
Qty: 20 TABLET | Refills: 0 | Status: SHIPPED | OUTPATIENT
Start: 2019-02-01 | End: 2019-02-11

## 2019-02-01 ASSESSMENT — ENCOUNTER SYMPTOMS
VOMITING: 0
SHORTNESS OF BREATH: 0
WHEEZING: 0
COLOR CHANGE: 0
NAUSEA: 0
SINUS PRESSURE: 0
ABDOMINAL PAIN: 0
COUGH: 1
EYE REDNESS: 0
CHEST TIGHTNESS: 0
CONSTIPATION: 0
PHOTOPHOBIA: 0
RHINORRHEA: 1
EYE PAIN: 0
DIARRHEA: 0
SORE THROAT: 1

## 2019-02-01 ASSESSMENT — PATIENT HEALTH QUESTIONNAIRE - PHQ9
SUM OF ALL RESPONSES TO PHQ QUESTIONS 1-9: 0
2. FEELING DOWN, DEPRESSED OR HOPELESS: 0
SUM OF ALL RESPONSES TO PHQ9 QUESTIONS 1 & 2: 0
1. LITTLE INTEREST OR PLEASURE IN DOING THINGS: 0
SUM OF ALL RESPONSES TO PHQ QUESTIONS 1-9: 0

## 2019-07-31 ENCOUNTER — APPOINTMENT (OUTPATIENT)
Age: 39
Setting detail: DERMATOLOGY
End: 2019-07-31

## 2019-07-31 VITALS — HEIGHT: 62 IN | WEIGHT: 127 LBS | RESPIRATION RATE: 18 BRPM

## 2019-07-31 DIAGNOSIS — L65.0 TELOGEN EFFLUVIUM: ICD-10-CM

## 2019-07-31 PROCEDURE — OTHER MIPS QUALITY: OTHER

## 2019-07-31 PROCEDURE — OTHER ADDITIONAL NOTES: OTHER

## 2019-07-31 PROCEDURE — OTHER COUNSELING: OTHER

## 2019-07-31 PROCEDURE — 99202 OFFICE O/P NEW SF 15 MIN: CPT

## 2019-07-31 ASSESSMENT — LOCATION SIMPLE DESCRIPTION DERM: LOCATION SIMPLE: POSTERIOR SCALP

## 2019-07-31 ASSESSMENT — LOCATION ZONE DERM: LOCATION ZONE: SCALP

## 2019-07-31 ASSESSMENT — LOCATION DETAILED DESCRIPTION DERM: LOCATION DETAILED: POSTERIOR MID-PARIETAL SCALP

## 2019-07-31 NOTE — PROCEDURE: MIPS QUALITY
Stop Augmentin, and start azithromycin    I sent azithromycin to the pharmacy
Quality 226: Preventive Care And Screening: Tobacco Use: Screening And Cessation Intervention: Patient screened for tobacco use and is an ex/non-smoker
Detail Level: Detailed
Quality 128: Preventive Care And Screening: Body Mass Index (Bmi) Screening And Follow-Up Plan: BMI is documented within normal parameters and no follow-up plan is required.

## 2019-07-31 NOTE — PROCEDURE: ADDITIONAL NOTES
Detail Level: Zone
Additional Notes: Viviscal is not advised while breastfeeding. Advised patient to return to clinic to purchase Viviscal when she has completed breastfeeding.

## 2019-09-25 ENCOUNTER — TELEPHONE (OUTPATIENT)
Dept: INTERNAL MEDICINE | Age: 39
End: 2019-09-25

## 2019-09-25 ENCOUNTER — OFFICE VISIT (OUTPATIENT)
Dept: INTERNAL MEDICINE | Age: 39
End: 2019-09-25
Payer: COMMERCIAL

## 2019-09-25 VITALS
HEIGHT: 62 IN | BODY MASS INDEX: 23.74 KG/M2 | WEIGHT: 129 LBS | RESPIRATION RATE: 18 BRPM | HEART RATE: 85 BPM | DIASTOLIC BLOOD PRESSURE: 78 MMHG | OXYGEN SATURATION: 99 % | SYSTOLIC BLOOD PRESSURE: 102 MMHG

## 2019-09-25 DIAGNOSIS — R21 RASH: Primary | ICD-10-CM

## 2019-09-25 PROCEDURE — 99212 OFFICE O/P EST SF 10 MIN: CPT | Performed by: NURSE PRACTITIONER

## 2019-09-25 RX ORDER — FERROUS SULFATE 325(65) MG
TABLET ORAL
Refills: 3 | COMMUNITY
Start: 2019-07-07 | End: 2019-10-30 | Stop reason: CLARIF

## 2019-09-25 RX ORDER — NORETHINDRONE 0.35 MG/1
TABLET ORAL
Refills: 12 | COMMUNITY
Start: 2019-08-02 | End: 2019-10-30 | Stop reason: CLARIF

## 2019-09-25 NOTE — PROGRESS NOTES
[facility]  [address] [address]  [address] [address]  Dept: [phone]  Dept Fax: [phone]: [phone]    Jacob Parekh is a 35 y.o. female who presents today for her medical conditions/complaints as noted below. Jacob Parekh is c/mervat Rash (Patient has rash all over body. ) and Alopecia (Patient states hair loss since July.)        HPI:     HPI   1. Rash; For several weeks; It is not itchings  These are red at times;  when they fade the skin is discolored white she has not changed creams or detergents; She has not tried anything OTC;    She is a 10month-old she is still pumping for breastmilk. She is not actually breast-feeding  Chief Complaint   Patient presents with    Rash     Patient has rash all over body.  Alopecia     Patient states hair loss since July. Past Medical History:   Diagnosis Date    Essential hypertension 12/21/2017    Gastroesophageal reflux disease without esophagitis 8/23/2017    Generalized anxiety disorder       History reviewed. No pertinent surgical history.     Vitals 9/25/2019 2/1/2019 12/14/2018 11/2/2018 10/25/2018 88/06/5420   SYSTOLIC 337 [phone]   DIASTOLIC 78 80 64 85 77 81   Site - - - - - -   Position - - - - - -   Cuff Size - - - - - -   Pulse 85 103 87 91 107 104   Temp - 98.2 97.9 - - -   Resp 18 - - - - -   SpO2 99 99 99 - - -   Weight 129 lb 135 lb 3.2 oz 127 lb 3.2 oz 122 lb 119 lb 121 lb   Height 5' 2\" 5' 2\" 5' 2\" - - -   BMI (wt*703/ht~2) 23.59 kg/m2 24.73 kg/m2 23.26 kg/m2 - - -       Family History   Problem Relation Age of Onset    Colon Cancer Father        Social History     Tobacco Use    Smoking status: Former Smoker    Smokeless tobacco: Never Used   Substance Use Topics    Alcohol use: Yes      Current Outpatient Medications   Medication Sig Dispense Refill    ISAURA 0.35 MG tablet TAKE 1 TABLET BY MOUTH EVERY DAY  12    Polyethylene Glycol 3350 ([employer]

## 2019-10-17 ENCOUNTER — TELEPHONE (OUTPATIENT)
Dept: INTERNAL MEDICINE | Age: 39
End: 2019-10-17

## 2019-10-17 DIAGNOSIS — Z00.00 ANNUAL PHYSICAL EXAM: Primary | ICD-10-CM

## 2019-10-21 ENCOUNTER — TELEPHONE (OUTPATIENT)
Dept: INTERNAL MEDICINE | Age: 39
End: 2019-10-21

## 2019-10-21 RX ORDER — AZITHROMYCIN 250 MG/1
TABLET, FILM COATED ORAL
Qty: 1 PACKET | Refills: 0 | Status: SHIPPED | OUTPATIENT
Start: 2019-10-21 | End: 2019-10-30 | Stop reason: CLARIF

## 2019-10-30 ENCOUNTER — OFFICE VISIT (OUTPATIENT)
Dept: INTERNAL MEDICINE | Age: 39
End: 2019-10-30
Payer: COMMERCIAL

## 2019-10-30 VITALS — RESPIRATION RATE: 18 BRPM | BODY MASS INDEX: 23.92 KG/M2 | WEIGHT: 130 LBS | HEIGHT: 62 IN

## 2019-10-30 DIAGNOSIS — R73.01 IFG (IMPAIRED FASTING GLUCOSE): ICD-10-CM

## 2019-10-30 DIAGNOSIS — Z00.00 ANNUAL PHYSICAL EXAM: ICD-10-CM

## 2019-10-30 DIAGNOSIS — Z12.31 VISIT FOR SCREENING MAMMOGRAM: ICD-10-CM

## 2019-10-30 DIAGNOSIS — Z00.00 ANNUAL PHYSICAL EXAM: Primary | ICD-10-CM

## 2019-10-30 DIAGNOSIS — Z23 NEED FOR IMMUNIZATION AGAINST INFLUENZA: ICD-10-CM

## 2019-10-30 DIAGNOSIS — R79.89 ELEVATED TSH: ICD-10-CM

## 2019-10-30 LAB
ALBUMIN SERPL-MCNC: 4.2 G/DL (ref 3.5–5.2)
ALP BLD-CCNC: 91 U/L (ref 35–104)
ALT SERPL-CCNC: 15 U/L (ref 5–33)
ANION GAP SERPL CALCULATED.3IONS-SCNC: 13 MMOL/L (ref 7–19)
AST SERPL-CCNC: 14 U/L (ref 5–32)
BASOPHILS ABSOLUTE: 0.1 K/UL (ref 0–0.2)
BASOPHILS RELATIVE PERCENT: 1 % (ref 0–1)
BILIRUB SERPL-MCNC: 0.3 MG/DL (ref 0.2–1.2)
BILIRUBIN URINE: NEGATIVE
BLOOD, URINE: NEGATIVE
BUN BLDV-MCNC: 17 MG/DL (ref 6–20)
CALCIUM SERPL-MCNC: 9.2 MG/DL (ref 8.6–10)
CHLORIDE BLD-SCNC: 105 MMOL/L (ref 98–111)
CHOLESTEROL, TOTAL: 184 MG/DL (ref 160–199)
CLARITY: CLEAR
CO2: 25 MMOL/L (ref 22–29)
COLOR: YELLOW
CREAT SERPL-MCNC: 0.5 MG/DL (ref 0.5–0.9)
EOSINOPHILS ABSOLUTE: 0.3 K/UL (ref 0–0.6)
EOSINOPHILS RELATIVE PERCENT: 3.6 % (ref 0–5)
GFR NON-AFRICAN AMERICAN: >60
GLUCOSE BLD-MCNC: 97 MG/DL (ref 74–109)
GLUCOSE URINE: NEGATIVE MG/DL
HBA1C MFR BLD: 5.3 % (ref 4–6)
HCT VFR BLD CALC: 41.7 % (ref 37–47)
HDLC SERPL-MCNC: 65 MG/DL (ref 65–121)
HEMOGLOBIN: 13.4 G/DL (ref 12–16)
IMMATURE GRANULOCYTES #: 0 K/UL
KETONES, URINE: NEGATIVE MG/DL
LDL CHOLESTEROL CALCULATED: 101 MG/DL
LEUKOCYTE ESTERASE, URINE: NEGATIVE
LYMPHOCYTES ABSOLUTE: 2.7 K/UL (ref 1.1–4.5)
LYMPHOCYTES RELATIVE PERCENT: 30.8 % (ref 20–40)
MCH RBC QN AUTO: 30.1 PG (ref 27–31)
MCHC RBC AUTO-ENTMCNC: 32.1 G/DL (ref 33–37)
MCV RBC AUTO: 93.7 FL (ref 81–99)
MONOCYTES ABSOLUTE: 0.5 K/UL (ref 0–0.9)
MONOCYTES RELATIVE PERCENT: 6.3 % (ref 0–10)
NEUTROPHILS ABSOLUTE: 5 K/UL (ref 1.5–7.5)
NEUTROPHILS RELATIVE PERCENT: 57.8 % (ref 50–65)
NITRITE, URINE: NEGATIVE
PDW BLD-RTO: 12.6 % (ref 11.5–14.5)
PH UA: 6.5 (ref 5–8)
PLATELET # BLD: 272 K/UL (ref 130–400)
PMV BLD AUTO: 10.3 FL (ref 9.4–12.3)
POTASSIUM SERPL-SCNC: 4.4 MMOL/L (ref 3.5–5)
PROTEIN UA: NEGATIVE MG/DL
RBC # BLD: 4.45 M/UL (ref 4.2–5.4)
SODIUM BLD-SCNC: 143 MMOL/L (ref 136–145)
SPECIFIC GRAVITY UA: 1.02 (ref 1–1.03)
T4 FREE: 0.9 NG/DL (ref 0.9–1.7)
TOTAL PROTEIN: 6.7 G/DL (ref 6.6–8.7)
TRIGL SERPL-MCNC: 92 MG/DL (ref 0–149)
TSH SERPL DL<=0.05 MIU/L-ACNC: 6.79 UIU/ML (ref 0.27–4.2)
URINE REFLEX TO CULTURE: NORMAL
UROBILINOGEN, URINE: 0.2 E.U./DL
WBC # BLD: 8.6 K/UL (ref 4.8–10.8)

## 2019-10-30 PROCEDURE — 90471 IMMUNIZATION ADMIN: CPT | Performed by: INTERNAL MEDICINE

## 2019-10-30 PROCEDURE — 99396 PREV VISIT EST AGE 40-64: CPT | Performed by: INTERNAL MEDICINE

## 2019-10-30 PROCEDURE — 90686 IIV4 VACC NO PRSV 0.5 ML IM: CPT | Performed by: INTERNAL MEDICINE

## 2019-10-30 RX ORDER — CLOTRIMAZOLE AND BETAMETHASONE DIPROPIONATE 10; .5 MG/ML; MG/ML
LOTION TOPICAL
Qty: 1 BOTTLE | Refills: 0 | Status: SHIPPED | OUTPATIENT
Start: 2019-10-30 | End: 2020-10-30 | Stop reason: CLARIF

## 2019-10-30 ASSESSMENT — ENCOUNTER SYMPTOMS
BLOOD IN STOOL: 0
WHEEZING: 0
SORE THROAT: 0
CONSTIPATION: 0
EYE REDNESS: 0
VOMITING: 0
SINUS PRESSURE: 0
COLOR CHANGE: 0
NAUSEA: 0
EYE PAIN: 0
DIARRHEA: 0
CHEST TIGHTNESS: 0
COUGH: 0
ABDOMINAL PAIN: 0
VOICE CHANGE: 0
TROUBLE SWALLOWING: 0

## 2019-10-31 ENCOUNTER — TELEPHONE (OUTPATIENT)
Dept: INTERNAL MEDICINE | Age: 39
End: 2019-10-31

## 2019-10-31 DIAGNOSIS — R79.89 ELEVATED TSH: Primary | ICD-10-CM

## 2019-11-14 ENCOUNTER — TELEPHONE (OUTPATIENT)
Dept: INTERNAL MEDICINE | Age: 39
End: 2019-11-14

## 2019-11-18 ENCOUNTER — TELEPHONE (OUTPATIENT)
Dept: INTERNAL MEDICINE | Age: 39
End: 2019-11-18

## 2019-11-18 RX ORDER — PERMETHRIN 50 MG/G
CREAM TOPICAL
Qty: 60 G | Refills: 0 | Status: SHIPPED | OUTPATIENT
Start: 2019-11-18 | End: 2019-11-20 | Stop reason: SDUPTHER

## 2019-11-20 RX ORDER — PERMETHRIN 50 MG/G
CREAM TOPICAL
Qty: 60 G | Refills: 0 | Status: SHIPPED | OUTPATIENT
Start: 2019-11-20 | End: 2019-12-05

## 2019-11-26 ENCOUNTER — APPOINTMENT (OUTPATIENT)
Age: 39
Setting detail: DERMATOLOGY
End: 2019-11-27

## 2019-11-26 VITALS — WEIGHT: 127 LBS | RESPIRATION RATE: 18 BRPM | HEIGHT: 62 IN

## 2019-11-26 DIAGNOSIS — L30.9 DERMATITIS, UNSPECIFIED: ICD-10-CM

## 2019-11-26 PROCEDURE — OTHER MEDICATION COUNSELING: OTHER

## 2019-11-26 PROCEDURE — 99213 OFFICE O/P EST LOW 20 MIN: CPT

## 2019-11-26 PROCEDURE — OTHER TREATMENT REGIMEN: OTHER

## 2019-11-26 PROCEDURE — OTHER COUNSELING: OTHER

## 2019-11-26 PROCEDURE — OTHER PRESCRIPTION: OTHER

## 2019-11-26 RX ORDER — TRIAMCINOLONE ACETONIDE 1 MG/G
CREAM TOPICAL BID
Qty: 1 | Refills: 1 | Status: ERX | COMMUNITY
Start: 2019-11-26

## 2019-11-26 ASSESSMENT — LOCATION SIMPLE DESCRIPTION DERM: LOCATION SIMPLE: ABDOMEN

## 2019-11-26 ASSESSMENT — LOCATION ZONE DERM: LOCATION ZONE: TRUNK

## 2019-11-26 ASSESSMENT — LOCATION DETAILED DESCRIPTION DERM: LOCATION DETAILED: EPIGASTRIC SKIN

## 2019-11-26 NOTE — HPI: RASH
What Type Of Note Output Would You Prefer (Optional)?: Bullet Format
How Severe Is Your Rash?: moderate
Is This A New Presentation, Or A Follow-Up?: Rash
Additional History: Patient’s  has pathology proven scabies and is being treated currently.

## 2019-11-26 NOTE — PROCEDURE: MEDICATION COUNSELING
Xelhasmukhz Pregnancy And Lactation Text: This medication is Pregnancy Category D and is not considered safe during pregnancy.  The risk during breast feeding is also uncertain.

## 2019-11-26 NOTE — PROCEDURE: TREATMENT REGIMEN
Plan: Advised patient that absence of itching makes scabies less likely but treating due to recent family diagnosis. \\nReturn to clinic in 6 weeks for re-evaluation.
Detail Level: Zone
Initiate Treatment: Ivermectin 3mg, take 4 pills po today, repeat in 2 weeks as directed\\nTriamcinolone cream, apply to affected areas of trunk twice a day x 2 weeks, stop x 1 week, repeat as needed

## 2019-12-05 ENCOUNTER — OFFICE VISIT (OUTPATIENT)
Dept: INTERNAL MEDICINE | Age: 39
End: 2019-12-05
Payer: COMMERCIAL

## 2019-12-05 VITALS
TEMPERATURE: 98.2 F | HEART RATE: 81 BPM | DIASTOLIC BLOOD PRESSURE: 70 MMHG | WEIGHT: 131 LBS | HEIGHT: 62 IN | OXYGEN SATURATION: 98 % | BODY MASS INDEX: 24.11 KG/M2 | SYSTOLIC BLOOD PRESSURE: 110 MMHG

## 2019-12-05 DIAGNOSIS — R21 SKIN RASH: ICD-10-CM

## 2019-12-05 DIAGNOSIS — J01.00 ACUTE NON-RECURRENT MAXILLARY SINUSITIS: Primary | ICD-10-CM

## 2019-12-05 DIAGNOSIS — E03.8 SUBCLINICAL HYPOTHYROIDISM: ICD-10-CM

## 2019-12-05 PROCEDURE — 99213 OFFICE O/P EST LOW 20 MIN: CPT | Performed by: INTERNAL MEDICINE

## 2019-12-05 RX ORDER — AZITHROMYCIN 250 MG/1
TABLET, FILM COATED ORAL
Qty: 1 PACKET | Refills: 0 | Status: SHIPPED | OUTPATIENT
Start: 2019-12-05 | End: 2020-03-13

## 2019-12-05 ASSESSMENT — ENCOUNTER SYMPTOMS
VOICE CHANGE: 0
COUGH: 0
SORE THROAT: 1
CHEST TIGHTNESS: 0
COLOR CHANGE: 0
BLOOD IN STOOL: 0
NAUSEA: 0
TROUBLE SWALLOWING: 0
DIARRHEA: 0
EYE PAIN: 0
ABDOMINAL PAIN: 0
EYE REDNESS: 0
VOMITING: 0
WHEEZING: 0
SINUS PRESSURE: 1
CONSTIPATION: 0

## 2019-12-10 DIAGNOSIS — R79.89 ELEVATED TSH: ICD-10-CM

## 2019-12-10 LAB
T4 FREE: 1.05 NG/DL (ref 0.93–1.7)
TSH SERPL DL<=0.05 MIU/L-ACNC: 5.78 UIU/ML (ref 0.27–4.2)

## 2019-12-11 DIAGNOSIS — R79.89 ELEVATED TSH: Primary | ICD-10-CM

## 2020-03-13 ENCOUNTER — OFFICE VISIT (OUTPATIENT)
Dept: INTERNAL MEDICINE | Age: 40
End: 2020-03-13
Payer: COMMERCIAL

## 2020-03-13 VITALS
DIASTOLIC BLOOD PRESSURE: 80 MMHG | HEIGHT: 62 IN | OXYGEN SATURATION: 93 % | TEMPERATURE: 97.6 F | SYSTOLIC BLOOD PRESSURE: 124 MMHG | WEIGHT: 129 LBS | BODY MASS INDEX: 23.74 KG/M2 | HEART RATE: 83 BPM

## 2020-03-13 PROCEDURE — 99213 OFFICE O/P EST LOW 20 MIN: CPT | Performed by: PHYSICIAN ASSISTANT

## 2020-03-13 RX ORDER — AZITHROMYCIN 250 MG/1
TABLET, FILM COATED ORAL
Qty: 6 TABLET | Refills: 0 | Status: SHIPPED | OUTPATIENT
Start: 2020-03-13 | End: 2020-05-20 | Stop reason: ALTCHOICE

## 2020-03-13 ASSESSMENT — PATIENT HEALTH QUESTIONNAIRE - PHQ9
SUM OF ALL RESPONSES TO PHQ QUESTIONS 1-9: 0
1. LITTLE INTEREST OR PLEASURE IN DOING THINGS: 0
SUM OF ALL RESPONSES TO PHQ9 QUESTIONS 1 & 2: 0
SUM OF ALL RESPONSES TO PHQ QUESTIONS 1-9: 0
2. FEELING DOWN, DEPRESSED OR HOPELESS: 0

## 2020-03-13 ASSESSMENT — ENCOUNTER SYMPTOMS
COLOR CHANGE: 0
CHEST TIGHTNESS: 0
SHORTNESS OF BREATH: 0
WHEEZING: 0
EYE REDNESS: 0
PHOTOPHOBIA: 0
NAUSEA: 0
SINUS PRESSURE: 0
EYE PAIN: 0
DIARRHEA: 0
RHINORRHEA: 1
COUGH: 1
ABDOMINAL PAIN: 0
VOMITING: 0
SORE THROAT: 1
CONSTIPATION: 0

## 2020-03-13 NOTE — PROGRESS NOTES
[facility]  [address] [address]  [address] [address]  Dept: [phone]  Dept Fax: [phone]: [phone]      [facility] INTER[facility]IC[facility]  OFFICE NOTE      Chief Complaint   Patient presents with    Pharyngitis     started yesterday     Congestion       Esperanza Duran is a 35y.o. year old female who is seen for sore throat, congestion, sinus drainage. 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 Patient states she is think she may be coming down with something. Patient denies any fever. Patient denies any recent travel. Patient states has a minor cough patient denies any shortness of breath or chest pain. Patient has a small child at home doing well to get him sick. Active Ambulatory Problems     Diagnosis Date Noted    Cervical dysplasia, mild 02/27/2012    Fibrocystic breast 03/15/2013    Generalized anxiety disorder 08/23/2017    Gastroesophageal reflux disease without esophagitis 08/23/2017    Essential hypertension 12/21/2017     Resolved Ambulatory Problems     Diagnosis Date Noted    Annual physical exam 08/23/2017     No Additional Past Medical History       Past Medical History:   Diagnosis Date    Essential hypertension 12/21/2017    Gastroesophageal reflux disease without esophagitis 8/23/2017    Generalized anxiety disorder        Prior to Visit Medications    Medication Sig Taking?  Authorizing Provider   azithromycin (ZITHROMAX) 250 MG tablet 500mg on day 1 followed by 250mg on days 2 - 5 Yes CHARLOTTE Malcolm   norgestimate-ethinyl estradiol (ORTHO TRI-CYCLEN LO) 0.025 MG tablet Take 1 tablet by mouth daily Yes Rosio Vargas MD   clotrimazole-betamethasone (LOTRISONE) 1-0.05 % lotion Apply topically 2 times daily x 2 weeks  Patient not taking: Reported on 3/13/2020  Rosio Vargas MD   Prenatal Vit-Fe Fumarate-FA (PRENATAL tightness, shortness of breath and wheezing. Cardiovascular: Negative for chest pain, palpitations and leg swelling. Gastrointestinal: Negative for abdominal pain, constipation, diarrhea, nausea and vomiting. Genitourinary: Negative for dysuria, frequency, hematuria and urgency. Musculoskeletal: Negative for arthralgias, gait problem, joint swelling and myalgias. Skin: Negative for color change, pallor and wound. Neurological: Negative for dizziness, facial asymmetry, speech difficulty, weakness and light-headedness. Hematological: Negative for adenopathy. Does not bruise/bleed easily. Psychiatric/Behavioral: Negative for confusion. The patient is not nervous/anxious. Current Outpatient Medications   Medication Sig Dispense Refill    azithromycin (ZITHROMAX) 250 MG tablet 500mg on day 1 followed by 250mg on days 2 - 5 6 tablet 0    norgestimate-ethinyl estradiol (ORTHO TRI-CYCLEN LO) 0.025 MG tablet Take 1 tablet by mouth daily 30 tablet 11    clotrimazole-betamethasone (LOTRISONE) 1-0.05 % lotion Apply topically 2 times daily x 2 weeks (Patient not taking: Reported on 3/13/2020) 1 Bottle 0    Prenatal Vit-Fe Fumarate-FA (PRENATAL VITAMIN PO) Take by mouth       No current facility-administered medications for this visit. /80   Pulse 83   Temp 97.6 °F (36.4 °C)   Ht 5' 2\" (1.575 m)   Wt 129 lb (58.5 kg)   SpO2 93%   BMI 23.59 kg/m²     PHYSICAL EXAM  Physical Exam  Vitals signs and nursing note reviewed. Constitutional:       Appearance: She is well-developed. HENT:      Head: Normocephalic and atraumatic. Right Ear: Tympanic membrane, ear canal and external ear normal.      Left Ear: Tympanic membrane, ear canal and external ear normal.      Nose: Congestion and rhinorrhea present. Mouth/Throat:      Pharynx: Oropharynx is clear. No pharyngeal swelling, oropharyngeal exudate or posterior oropharyngeal erythema. Tonsils: No tonsillar exudate.

## 2020-04-01 ENCOUNTER — APPOINTMENT (OUTPATIENT)
Age: 40
Setting detail: DERMATOLOGY
End: 2020-04-04

## 2020-04-01 VITALS — RESPIRATION RATE: 18 BRPM | WEIGHT: 127 LBS | HEIGHT: 63 IN | TEMPERATURE: 98.9 F

## 2020-04-01 DIAGNOSIS — L30.9 DERMATITIS, UNSPECIFIED: ICD-10-CM

## 2020-04-01 PROCEDURE — OTHER COUNSELING: OTHER

## 2020-04-01 PROCEDURE — OTHER TREATMENT REGIMEN: OTHER

## 2020-04-01 PROCEDURE — OTHER MEDICATION COUNSELING: OTHER

## 2020-04-01 PROCEDURE — OTHER PRESCRIPTION: OTHER

## 2020-04-01 PROCEDURE — 99213 OFFICE O/P EST LOW 20 MIN: CPT

## 2020-04-01 RX ORDER — CLOBETASOL PROPIONATE 0.5 MG/G
CREAM TOPICAL
Qty: 1 | Refills: 1 | Status: ERX | COMMUNITY
Start: 2020-04-01

## 2020-04-01 ASSESSMENT — LOCATION DETAILED DESCRIPTION DERM: LOCATION DETAILED: PERIUMBILICAL SKIN

## 2020-04-01 ASSESSMENT — LOCATION SIMPLE DESCRIPTION DERM: LOCATION SIMPLE: ABDOMEN

## 2020-04-01 ASSESSMENT — LOCATION ZONE DERM: LOCATION ZONE: TRUNK

## 2020-04-01 NOTE — PROCEDURE: MEDICATION COUNSELING
Please notify the patient of normal results.  Follow-up as planned. Methotrexate Counseling:  Patient counseled regarding adverse effects of methotrexate including but not limited to nausea, vomiting, abnormalities in liver function tests. Patients may develop mouth sores, rash, diarrhea, and abnormalities in blood counts. The patient understands that monitoring is required including LFT's and blood counts.  There is a rare possibility of scarring of the liver and lung problems that can occur when taking methotrexate. Persistent nausea, loss of appetite, pale stools, dark urine, cough, and shortness of breath should be reported immediately. Patient advised to discontinue methotrexate treatment at least three months before attempting to become pregnant.  I discussed the need for folate supplements while taking methotrexate.  These supplements can decrease side effects during methotrexate treatment. The patient verbalized understanding of the proper use and possible adverse effects of methotrexate.  All of the patient's questions and concerns were addressed.

## 2020-04-01 NOTE — PROCEDURE: TREATMENT REGIMEN
Initiate Treatment: Clobetasol 0.05% cream apply to hot spot areas of the abdomen BID x 14 days, then stop x 7 days. Repeat as needed
Discontinue Regimen: Triamcinolone 1% cream
Detail Level: Zone
Plan: Patient will call clinic as needed for follow up appointment.

## 2020-04-03 ENCOUNTER — RX ONLY (RX ONLY)
Age: 40
End: 2020-04-03

## 2020-04-03 RX ORDER — CLOBETASOL PROPIONATE 0.5 MG/G
CREAM TOPICAL
Qty: 1 | Refills: 1 | Status: ERX

## 2020-05-18 ENCOUNTER — TELEPHONE (OUTPATIENT)
Dept: INTERNAL MEDICINE | Age: 40
End: 2020-05-18

## 2020-05-18 RX ORDER — FLUCONAZOLE 150 MG/1
100 TABLET ORAL DAILY
Qty: 2 TABLET | Refills: 0 | Status: SHIPPED | OUTPATIENT
Start: 2020-05-18 | End: 2020-05-20 | Stop reason: SDUPTHER

## 2020-05-20 ENCOUNTER — OFFICE VISIT (OUTPATIENT)
Dept: INTERNAL MEDICINE | Age: 40
End: 2020-05-20
Payer: COMMERCIAL

## 2020-05-20 VITALS
OXYGEN SATURATION: 98 % | BODY MASS INDEX: 22.31 KG/M2 | DIASTOLIC BLOOD PRESSURE: 82 MMHG | WEIGHT: 122 LBS | HEART RATE: 70 BPM | SYSTOLIC BLOOD PRESSURE: 124 MMHG

## 2020-05-20 DIAGNOSIS — R39.9 UTI SYMPTOMS: ICD-10-CM

## 2020-05-20 LAB
APPEARANCE FLUID: ABNORMAL
BILIRUBIN, POC: ABNORMAL
BLOOD URINE, POC: ABNORMAL
CLARITY, POC: ABNORMAL
COLOR, POC: YELLOW
GLUCOSE URINE, POC: 100
KETONES, POC: ABNORMAL
LEUKOCYTE EST, POC: ABNORMAL
NITRITE, POC: ABNORMAL
PH, POC: 6.5
PROTEIN, POC: ABNORMAL
SPECIFIC GRAVITY, POC: 1.02
UROBILINOGEN, POC: 1

## 2020-05-20 PROCEDURE — 99213 OFFICE O/P EST LOW 20 MIN: CPT | Performed by: INTERNAL MEDICINE

## 2020-05-20 PROCEDURE — 81002 URINALYSIS NONAUTO W/O SCOPE: CPT | Performed by: INTERNAL MEDICINE

## 2020-05-20 RX ORDER — CIPROFLOXACIN 250 MG/1
250 TABLET, FILM COATED ORAL 2 TIMES DAILY
Qty: 6 TABLET | Refills: 0 | Status: SHIPPED | OUTPATIENT
Start: 2020-05-20 | End: 2020-05-23

## 2020-05-20 RX ORDER — FLUCONAZOLE 150 MG/1
150 TABLET ORAL DAILY
Qty: 2 TABLET | Refills: 0 | Status: SHIPPED | OUTPATIENT
Start: 2020-05-20 | End: 2020-05-22

## 2020-05-20 RX ORDER — FLUCONAZOLE 150 MG/1
100 TABLET ORAL DAILY
Qty: 2 TABLET | Refills: 0 | Status: SHIPPED | OUTPATIENT
Start: 2020-05-20 | End: 2020-05-20 | Stop reason: SDUPTHER

## 2020-05-20 ASSESSMENT — ENCOUNTER SYMPTOMS
SORE THROAT: 0
ABDOMINAL PAIN: 0
WHEEZING: 0
CHEST TIGHTNESS: 0
CONSTIPATION: 0
COUGH: 0

## 2020-05-20 NOTE — PROGRESS NOTES
congestion, ear pain, nosebleeds, postnasal drip and sore throat. Respiratory: Negative for cough, chest tightness and wheezing. Cardiovascular: Negative for chest pain, palpitations and leg swelling. Gastrointestinal: Negative for abdominal pain and constipation. Genitourinary: Positive for dysuria and vaginal discharge. Negative for urgency. Musculoskeletal: Negative. Negative for arthralgias. Skin: Negative for rash. Neurological: Negative for dizziness and headaches. Psychiatric/Behavioral: Negative. Vitals:    05/20/20 1233   BP: 124/82   Pulse: 70   SpO2: 98%   Weight: 122 lb (55.3 kg)     Body mass index is 22.31 kg/m². Physical Exam  Constitutional:       Appearance: She is well-developed. HENT:      Right Ear: External ear normal.      Left Ear: External ear normal.      Mouth/Throat:      Pharynx: No oropharyngeal exudate. Eyes:      Conjunctiva/sclera: Conjunctivae normal.      Pupils: Pupils are equal, round, and reactive to light. Neck:      Musculoskeletal: Neck supple. Thyroid: No thyromegaly. Vascular: No JVD. Cardiovascular:      Rate and Rhythm: Normal rate. Heart sounds: Normal heart sounds. No murmur. Pulmonary:      Effort: No respiratory distress. Breath sounds: Normal breath sounds. No wheezing or rales. Chest:      Chest wall: No tenderness. Abdominal:      General: Bowel sounds are normal.      Palpations: Abdomen is soft. Musculoskeletal: Normal range of motion. Lymphadenopathy:      Cervical: No cervical adenopathy. Skin:     General: Skin is warm. Findings: No rash. Neurological:      Mental Status: She is oriented to person, place, and time. Lab Review   No visits with results within 2 Month(s) from this visit.    Latest known visit with results is:   Orders Only on 12/10/2019   Component Date Value    T4 Free 12/10/2019 1.05     TSH 12/10/2019 5.780*           ASSESSMENT/PLAN:    Candida vaginitis    UTI

## 2020-05-22 LAB
ORGANISM: ABNORMAL
ORGANISM: ABNORMAL
URINE CULTURE, ROUTINE: ABNORMAL

## 2020-06-26 DIAGNOSIS — R79.89 ELEVATED TSH: ICD-10-CM

## 2020-06-26 LAB
T4 FREE: 1.08 NG/DL (ref 0.93–1.7)
TSH SERPL DL<=0.05 MIU/L-ACNC: 2.25 UIU/ML (ref 0.27–4.2)

## 2020-10-29 DIAGNOSIS — R73.01 IFG (IMPAIRED FASTING GLUCOSE): ICD-10-CM

## 2020-10-29 DIAGNOSIS — Z23 NEED FOR IMMUNIZATION AGAINST INFLUENZA: ICD-10-CM

## 2020-10-29 DIAGNOSIS — Z00.00 ANNUAL PHYSICAL EXAM: ICD-10-CM

## 2020-10-29 DIAGNOSIS — R79.89 ELEVATED TSH: ICD-10-CM

## 2020-10-29 LAB
ALBUMIN SERPL-MCNC: 4.1 G/DL (ref 3.5–5.2)
ALP BLD-CCNC: 62 U/L (ref 35–104)
ALT SERPL-CCNC: 21 U/L (ref 5–33)
ANION GAP SERPL CALCULATED.3IONS-SCNC: 8 MMOL/L (ref 7–19)
AST SERPL-CCNC: 19 U/L (ref 5–32)
BACTERIA: ABNORMAL /HPF
BASOPHILS ABSOLUTE: 0.1 K/UL (ref 0–0.2)
BASOPHILS RELATIVE PERCENT: 0.7 % (ref 0–1)
BILIRUB SERPL-MCNC: 0.3 MG/DL (ref 0.2–1.2)
BILIRUBIN URINE: NEGATIVE
BLOOD, URINE: ABNORMAL
BUN BLDV-MCNC: 15 MG/DL (ref 6–20)
CALCIUM SERPL-MCNC: 8.6 MG/DL (ref 8.6–10)
CHLORIDE BLD-SCNC: 106 MMOL/L (ref 98–111)
CHOLESTEROL, TOTAL: 173 MG/DL (ref 160–199)
CLARITY: CLEAR
CO2: 24 MMOL/L (ref 22–29)
COLOR: YELLOW
CREAT SERPL-MCNC: 0.6 MG/DL (ref 0.5–0.9)
CRYSTALS, UA: ABNORMAL /HPF
EOSINOPHILS ABSOLUTE: 0.2 K/UL (ref 0–0.6)
EOSINOPHILS RELATIVE PERCENT: 3.3 % (ref 0–5)
EPITHELIAL CELLS, UA: 15 /HPF (ref 0–5)
GFR AFRICAN AMERICAN: >59
GFR NON-AFRICAN AMERICAN: >60
GLUCOSE BLD-MCNC: 100 MG/DL (ref 74–109)
GLUCOSE URINE: NEGATIVE MG/DL
HCT VFR BLD CALC: 40.4 % (ref 37–47)
HDLC SERPL-MCNC: 71 MG/DL (ref 65–121)
HEMOGLOBIN: 13.1 G/DL (ref 12–16)
HYALINE CASTS: 4 /HPF (ref 0–8)
IMMATURE GRANULOCYTES #: 0 K/UL
KETONES, URINE: NEGATIVE MG/DL
LDL CHOLESTEROL CALCULATED: 91 MG/DL
LEUKOCYTE ESTERASE, URINE: NEGATIVE
LYMPHOCYTES ABSOLUTE: 3.1 K/UL (ref 1.1–4.5)
LYMPHOCYTES RELATIVE PERCENT: 44.3 % (ref 20–40)
MCH RBC QN AUTO: 29.8 PG (ref 27–31)
MCHC RBC AUTO-ENTMCNC: 32.4 G/DL (ref 33–37)
MCV RBC AUTO: 92 FL (ref 81–99)
MONOCYTES ABSOLUTE: 0.4 K/UL (ref 0–0.9)
MONOCYTES RELATIVE PERCENT: 6.2 % (ref 0–10)
NEUTROPHILS ABSOLUTE: 3.2 K/UL (ref 1.5–7.5)
NEUTROPHILS RELATIVE PERCENT: 45.4 % (ref 50–65)
NITRITE, URINE: NEGATIVE
PDW BLD-RTO: 12.2 % (ref 11.5–14.5)
PH UA: 5.5 (ref 5–8)
PLATELET # BLD: 262 K/UL (ref 130–400)
PMV BLD AUTO: 10.5 FL (ref 9.4–12.3)
POTASSIUM SERPL-SCNC: 4.5 MMOL/L (ref 3.5–5)
PROTEIN UA: NEGATIVE MG/DL
RBC # BLD: 4.39 M/UL (ref 4.2–5.4)
RBC UA: 5 /HPF (ref 0–4)
SODIUM BLD-SCNC: 138 MMOL/L (ref 136–145)
SPECIFIC GRAVITY UA: 1.02 (ref 1–1.03)
T4 FREE: 0.97 NG/DL (ref 0.93–1.7)
TOTAL PROTEIN: 6.2 G/DL (ref 6.6–8.7)
TRIGL SERPL-MCNC: 55 MG/DL (ref 0–149)
TSH SERPL DL<=0.05 MIU/L-ACNC: 4.24 UIU/ML (ref 0.27–4.2)
UROBILINOGEN, URINE: 0.2 E.U./DL
WBC # BLD: 7 K/UL (ref 4.8–10.8)
WBC UA: 9 /HPF (ref 0–5)

## 2020-10-30 ENCOUNTER — OFFICE VISIT (OUTPATIENT)
Dept: INTERNAL MEDICINE | Age: 40
End: 2020-10-30
Payer: COMMERCIAL

## 2020-10-30 VITALS
HEIGHT: 62 IN | BODY MASS INDEX: 22.45 KG/M2 | DIASTOLIC BLOOD PRESSURE: 80 MMHG | HEART RATE: 98 BPM | SYSTOLIC BLOOD PRESSURE: 128 MMHG | RESPIRATION RATE: 18 BRPM | WEIGHT: 122 LBS | OXYGEN SATURATION: 99 %

## 2020-10-30 PROCEDURE — 99396 PREV VISIT EST AGE 40-64: CPT | Performed by: INTERNAL MEDICINE

## 2020-10-30 PROCEDURE — 90471 IMMUNIZATION ADMIN: CPT | Performed by: INTERNAL MEDICINE

## 2020-10-30 PROCEDURE — 90686 IIV4 VACC NO PRSV 0.5 ML IM: CPT | Performed by: INTERNAL MEDICINE

## 2020-10-30 RX ORDER — NORGESTIMATE AND ETHINYL ESTRADIOL 7DAYSX3 LO
KIT ORAL
Qty: 28 TABLET | Refills: 11 | Status: SHIPPED | OUTPATIENT
Start: 2020-10-30 | End: 2021-02-22

## 2020-10-30 ASSESSMENT — ENCOUNTER SYMPTOMS
SORE THROAT: 0
COLOR CHANGE: 0
DIARRHEA: 0
VOMITING: 0
WHEEZING: 0
SINUS PRESSURE: 0
ABDOMINAL PAIN: 0
CHEST TIGHTNESS: 0
TROUBLE SWALLOWING: 0
EYE REDNESS: 0
EYE PAIN: 0
BLOOD IN STOOL: 0
COUGH: 0
VOICE CHANGE: 0
NAUSEA: 0
CONSTIPATION: 0

## 2020-10-30 NOTE — PROGRESS NOTES
Chief Complaint:   Caro Leonard is a 38 y.o. female who presents forcomplete physical exam.    History of Present Illness:      Caro Leonard is a 38 y.o. female who presents todayfor wellness visit AND follow up on her chronic medical conditions as noted below. She is here for wellness visit today    She overall has been doing well, some stress related to her grandfather. Her baby, her son is now 21 months old    Patient Active Problem List    Diagnosis Date Noted    Essential hypertension 12/21/2017    Generalized anxiety disorder 08/23/2017    Gastroesophageal reflux disease without esophagitis 08/23/2017    Fibrocystic breast 03/15/2013    Cervical dysplasia, mild 02/27/2012       Past Medical History:   Diagnosis Date    Essential hypertension 12/21/2017    Gastroesophageal reflux disease without esophagitis 8/23/2017    Generalized anxiety disorder        No past surgical history on file. Current Outpatient Medications   Medication Sig Dispense Refill    Norgestim-Eth Estrad Triphasic (ORTHO TRI-CYCLEN LO) 0.18/0.215/0.25 MG-25 MCG TABS Take as directed 28 tablet 11     No current facility-administered medications for this visit. No Known Allergies    Social History     Socioeconomic History    Marital status:      Spouse name: None    Number of children: None    Years of education: None    Highest education level: None   Occupational History    Occupation: ultrasonographer   Social Needs    Financial resource strain: None    Food insecurity     Worry: None     Inability: None    Transportation needs     Medical: None     Non-medical: None   Tobacco Use    Smoking status: Former Smoker    Smokeless tobacco: Never Used   Substance and Sexual Activity    Alcohol use:  Yes    Drug use: No    Sexual activity: Yes     Partners: Male   Lifestyle    Physical activity     Days per week: None     Minutes per session: None    Stress: None   Relationships    Social connections Talks on phone: None     Gets together: None     Attends Sikh service: None     Active member of club or organization: None     Attends meetings of clubs or organizations: None     Relationship status: None    Intimate partner violence     Fear of current or ex partner: None     Emotionally abused: None     Physically abused: None     Forced sexual activity: None   Other Topics Concern    None   Social History Narrative    None     Family History   Problem Relation Age of Onset    Colon Cancer Father 48        No past surgical history on file.       Lab Review   Orders Only on 10/29/2020   Component Date Value    Color, UA 10/29/2020 YELLOW     Clarity, UA 10/29/2020 Clear     Glucose, Ur 10/29/2020 Negative     Bilirubin Urine 10/29/2020 Negative     Ketones, Urine 10/29/2020 Negative     Specific Gravity, UA 10/29/2020 1.024     Blood, Urine 10/29/2020 SMALL*    pH, UA 10/29/2020 5.5     Protein, UA 10/29/2020 Negative     Urobilinogen, Urine 10/29/2020 0.2     Nitrite, Urine 10/29/2020 Negative     Leukocyte Esterase, Urine 10/29/2020 Negative     TSH 10/29/2020 4.240*    Cholesterol, Total 10/29/2020 173     Triglycerides 10/29/2020 55     HDL 10/29/2020 71     LDL Calculated 10/29/2020 91     Sodium 10/29/2020 138     Potassium 10/29/2020 4.5     Chloride 10/29/2020 106     CO2 10/29/2020 24     Anion Gap 10/29/2020 8     Glucose 10/29/2020 100     BUN 10/29/2020 15     CREATININE 10/29/2020 0.6     GFR Non- 10/29/2020 >60     GFR  10/29/2020 >59     Calcium 10/29/2020 8.6     Total Protein 10/29/2020 6.2*    Alb 10/29/2020 4.1     Total Bilirubin 10/29/2020 0.3     Alkaline Phosphatase 10/29/2020 62     ALT 10/29/2020 21     AST 10/29/2020 19     WBC 10/29/2020 7.0     RBC 10/29/2020 4.39     Hemoglobin 10/29/2020 13.1     Hematocrit 10/29/2020 40.4     MCV 10/29/2020 92.0     MCH 10/29/2020 29.8     MCHC 10/29/2020 32.4*    RDW 10/29/2020 12.2     Platelets 25/39/4464 262     MPV 10/29/2020 10.5     Neutrophils % 10/29/2020 45.4*    Lymphocytes % 10/29/2020 44.3*    Monocytes % 10/29/2020 6.2     Eosinophils % 10/29/2020 3.3     Basophils % 10/29/2020 0.7     Neutrophils Absolute 10/29/2020 3.2     Immature Granulocytes # 10/29/2020 0.0     Lymphocytes Absolute 10/29/2020 3.1     Monocytes Absolute 10/29/2020 0.40     Eosinophils Absolute 10/29/2020 0.20     Basophils Absolute 10/29/2020 0.10     T4 Free 10/29/2020 0.97     Bacteria, UA 10/29/2020 TRACE*    Crystals, UA 10/29/2020 NEG*    Hyaline Casts, UA 10/29/2020 4     WBC, UA 10/29/2020 9*    RBC, UA 10/29/2020 5*    Epithelial Cells, UA 10/29/2020 15          Review of Systems   Constitutional: Negative for chills, fatigue and fever. HENT: Negative for congestion, ear pain, postnasal drip, sinus pressure, sore throat, trouble swallowing and voice change. Eyes: Negative for pain, redness and visual disturbance. Respiratory: Negative for cough, chest tightness and wheezing. Cardiovascular: Negative for chest pain, palpitations and leg swelling. Gastrointestinal: Negative for abdominal pain, blood in stool, constipation, diarrhea, nausea and vomiting. Endocrine: Negative for polydipsia and polyuria. Genitourinary: Negative for dysuria, enuresis, flank pain, frequency and urgency. Musculoskeletal: Negative for arthralgias, gait problem and joint swelling. Skin: Negative for color change and rash. Neurological: Negative for dizziness, tremors, syncope, facial asymmetry, speech difficulty, weakness, numbness and headaches. Psychiatric/Behavioral: Negative for agitation, behavioral problems, confusion, sleep disturbance and suicidal ideas. The patient is not nervous/anxious.            Vitals:    10/30/20 1434   BP: 128/80   Site: Left Upper Arm   Position: Sitting   Cuff Size: Large Adult   Pulse: 98   Resp: 18   SpO2: 99%   Weight: 122 lb (55.3 kg)   Height: 5' 2\" (1.575 m)      Wt Readings from Last 3 Encounters:   10/30/20 122 lb (55.3 kg)   05/20/20 122 lb (55.3 kg)   03/13/20 129 lb (58.5 kg)   Body mass index is 22.31 kg/m². BP Readings from Last 3 Encounters:   10/30/20 128/80   05/20/20 124/82   03/13/20 124/80       Physical Exam  Constitutional:       General: She is not in acute distress. Appearance: She is well-developed. She is not diaphoretic. HENT:      Head: Normocephalic and atraumatic. Nose: Nose normal.   Eyes:      General: No scleral icterus. Right eye: No discharge. Left eye: No discharge. Conjunctiva/sclera: Conjunctivae normal.      Pupils: Pupils are equal, round, and reactive to light. Neck:      Musculoskeletal: Normal range of motion. Thyroid: No thyromegaly. Vascular: No JVD. Cardiovascular:      Rate and Rhythm: Normal rate and regular rhythm. Heart sounds: No murmur. Pulmonary:      Breath sounds: Normal breath sounds. No wheezing or rales. Chest:      Chest wall: No tenderness. Abdominal:      General: Bowel sounds are normal. There is no distension. Tenderness: There is no guarding. Lymphadenopathy:      Cervical: No cervical adenopathy. Skin:     General: Skin is dry. Findings: No erythema or rash. Neurological:      Mental Status: She is oriented to person, place, and time. Cranial Nerves: No cranial nerve deficit. Coordination: Coordination normal.      Deep Tendon Reflexes: Reflexes are normal and symmetric. Psychiatric:         Behavior: Behavior normal.         Thought Content:  Thought content normal.         Judgment: Judgment normal.           ASSESSMENT/PLAN    Annual physical exam    Plan mammogram -mammogram orders to the patient  Fasting lab results all discussed with the patient-fasting blood sugar and lipid panel are normal  PAP 2018- repeat 3 yrs     Need for immunization against influenza  -     INFLUENZA, QUADV, 3 YRS AND OLDER, IM PF, PREFILL SYR OR SDV, 0.5ML (AFLURIA QUADV, PF)        Elevated TSH 4.2 (6.74)  free t4 0.97  Monitor    Family history of colon cancer, biological dad colon cancer at age 48. GI referral/need for screening colonoscopy      Orders Placed This Encounter   Procedures    GUERDA DIGITAL SCREEN W OR WO CAD BILATERAL    INFLUENZA, QUADV, 3 YRS AND OLDER, IM PF, PREFILL SYR OR SDV, 0.5ML (AFLURIA QUADV, PF)    CBC Auto Differential    Comprehensive Metabolic Panel    Lipid Panel    Urinalysis    TSH without Reflex    Vitamin D 25 Hydroxy   Cat Grandchild - Phoebe Alejandro MD, Gastroenterology, [place]     New Prescriptions    NORGESTIM-ETH ESTRAD TRIPHASIC (ORTHO TRI-CYCLEN LO) 0.18/0.215/0.25 MG-25 MCG TABS    Take as directed      There are no Patient Instructions on file for this visit. Return in about 1 year (around 10/30/2021) for Annual Physical.   EMR Dragon/transcription disclaimer:Significant part of this  encounter note is electronic transcription/translation of spoken language to printed text. The electronic translation of spoken language may beerroneous, or at times, nonsensical words or phrases may be inadvertently transcribed.  Although I have reviewed the note for such errors, some may still exist.

## 2020-10-30 NOTE — PROGRESS NOTES
Pt received an injection of Flu Vaccine in the left deltoid in the office today. Pt has signed an Injection consent form for the injection. Pt does not show any signs of reaction to the injection. Pt tolerated the injection well, and is advised to call the office if he/she notices any kind of reaction to happen once the Pt leaves the office.

## 2020-11-17 ENCOUNTER — VIRTUAL VISIT (OUTPATIENT)
Dept: INTERNAL MEDICINE | Age: 40
End: 2020-11-17
Payer: COMMERCIAL

## 2020-11-17 PROCEDURE — 99213 OFFICE O/P EST LOW 20 MIN: CPT | Performed by: INTERNAL MEDICINE

## 2020-11-17 RX ORDER — ALBUTEROL SULFATE 90 UG/1
2 AEROSOL, METERED RESPIRATORY (INHALATION) 4 TIMES DAILY PRN
Qty: 1 INHALER | Refills: 0 | Status: SHIPPED | OUTPATIENT
Start: 2020-11-17 | End: 2021-02-26

## 2020-11-17 ASSESSMENT — ENCOUNTER SYMPTOMS
COUGH: 1
WHEEZING: 0
ABDOMINAL PAIN: 0
SORE THROAT: 0
CHEST TIGHTNESS: 0
CONSTIPATION: 0
SHORTNESS OF BREATH: 1

## 2020-11-17 NOTE — PROGRESS NOTES
Chief Complaint   Patient presents with    Pharyngitis    Cough     History of presenting illness:  Keny Calixto is a35 y.o. female     TELEHEALTH EVALUATION -- Audio/Visual (During VNHUG-73 public health emergency)  Patient location-home  Pursuant to the emergency declaration under the [facility] [place] [address] [facility] authority and the [employer] Act, this Virtual  Visit was conducted, with patient's consent, to reduce the patient's risk of exposure to COVID-19 and provide continuity of care for an established patient. Services were provided through a video synchronous discussion virtually to substitute for in-person clinic visit. Reason for VV    Co  Sore throat- little  Congestion  Little cough few times per day  Exposure to grandfather with covid on 11/12/20   Rapid covid test negative on  11/13/20    No nausea    No fever/ chills    No taste changes    Patient Active Problem List    Diagnosis Date Noted    Essential hypertension 12/21/2017    Generalized anxiety disorder 08/23/2017    Gastroesophageal reflux disease without esophagitis 08/23/2017    Fibrocystic breast 03/15/2013    Cervical dysplasia, mild 02/27/2012     Past Medical History:   Diagnosis Date    Essential hypertension 12/21/2017    Gastroesophageal reflux disease without esophagitis 8/23/2017    Generalized anxiety disorder       No past surgical history on file. Current Outpatient Medications   Medication Sig Dispense Refill    albuterol sulfate HFA (VENTOLIN HFA) 108 (90 Base) MCG/ACT inhaler Inhale 2 puffs into the lungs 4 times daily as needed for Wheezing 1 Inhaler 0    Norgestim-Eth Estrad Triphasic (ORTHO TRI-CYCLEN LO) 0.18/0.215/0.25 MG-25 MCG TABS Take as directed 28 tablet 11     No current facility-administered medications for this visit.       No Known Allergies  Social History     Tobacco Use    Smoking status: Former Smoker  Smokeless tobacco: Never Used   Substance Use Topics    Alcohol use: Yes      Family History   Problem Relation Age of Onset    Colon Cancer Father 48       Review of Systems   Constitutional: Positive for fatigue. Negative for chills and fever. HENT: Negative for congestion, ear pain, nosebleeds, postnasal drip and sore throat. Respiratory: Positive for cough and shortness of breath. Negative for chest tightness and wheezing. Cardiovascular: Negative for chest pain, palpitations and leg swelling. Gastrointestinal: Negative for abdominal pain and constipation. Genitourinary: Negative for dysuria and urgency. Musculoskeletal: Negative. Negative for arthralgias. Skin: Negative for rash. Neurological: Negative for dizziness and headaches. Psychiatric/Behavioral: Negative. There were no vitals filed for this visit. There is no height or weight on file to calculate BMI.   Patient-Reported Vitals 11/17/2020   Patient-Reported Weight 120   Patient-Reported Height 5 2   Patient-Reported Systolic 411   Patient-Reported Diastolic 72   Patient-Reported Temperature 98.3        Physical Exam  PHYSICAL EXAMINATION:  [ INSTRUCTIONS:  \"[x]\" Indicates a positive item  \"[]\" Indicates a negative item  -- DELETE ALL ITEMS NOT EXAMINED]  [x] Alert  [x] Oriented to person/place/time    [x] No apparent distress  [] Toxic appearing    [] Face flushed appearing [] Sclera clear  [] Lips are cyanotic      [x] Breathing appears normal  [] Appears tachypneic      [] Rash on visible skin    [x] Cranial Nerves II-XII grossly intact    [x] Motor grossly intact in visible upper extremities    [x] Motor grossly intact in visible lower extremities    [x] Normal Mood  [] Anxious appearing    [] Depressed appearing  [] Confused appearing      [] Poor short term memory  [] Poor long term memory    [] OTHER:      Due to this being a TeleHealth encounter, evaluation of the following organ systems is limited: Vitals/Constitutional/EENT/Resp/CV/GI//MS/Neuro/Skin/Heme-Lymph-Imm.     Lab Review   Orders Only on 10/29/2020   Component Date Value    Color, UA 10/29/2020 YELLOW     Clarity, UA 10/29/2020 Clear     Glucose, Ur 10/29/2020 Negative     Bilirubin Urine 10/29/2020 Negative     Ketones, Urine 10/29/2020 Negative     Specific Gravity, UA 10/29/2020 1.024     Blood, Urine 10/29/2020 SMALL*    pH, UA 10/29/2020 5.5     Protein, UA 10/29/2020 Negative     Urobilinogen, Urine 10/29/2020 0.2     Nitrite, Urine 10/29/2020 Negative     Leukocyte Esterase, Urine 10/29/2020 Negative     TSH 10/29/2020 4.240*    Cholesterol, Total 10/29/2020 173     Triglycerides 10/29/2020 55     HDL 10/29/2020 71     LDL Calculated 10/29/2020 91     Sodium 10/29/2020 138     Potassium 10/29/2020 4.5     Chloride 10/29/2020 106     CO2 10/29/2020 24     Anion Gap 10/29/2020 8     Glucose 10/29/2020 100     BUN 10/29/2020 15     CREATININE 10/29/2020 0.6     GFR Non- 10/29/2020 >60     GFR  10/29/2020 >59     Calcium 10/29/2020 8.6     Total Protein 10/29/2020 6.2*    Alb 10/29/2020 4.1     Total Bilirubin 10/29/2020 0.3     Alkaline Phosphatase 10/29/2020 62     ALT 10/29/2020 21     AST 10/29/2020 19     WBC 10/29/2020 7.0     RBC 10/29/2020 4.39     Hemoglobin 10/29/2020 13.1     Hematocrit 10/29/2020 40.4     MCV 10/29/2020 92.0     MCH 10/29/2020 29.8     MCHC 10/29/2020 32.4*    RDW 10/29/2020 12.2     Platelets 43/67/1147 262     MPV 10/29/2020 10.5     Neutrophils % 10/29/2020 45.4*    Lymphocytes % 10/29/2020 44.3*    Monocytes % 10/29/2020 6.2     Eosinophils % 10/29/2020 3.3     Basophils % 10/29/2020 0.7     Neutrophils Absolute 10/29/2020 3.2     Immature Granulocytes # 10/29/2020 0.0     Lymphocytes Absolute 10/29/2020 3.1     Monocytes Absolute 10/29/2020 0.40     Eosinophils Absolute 10/29/2020 0.20     Basophils Absolute 10/29/2020 0.10  T4 Free 10/29/2020 0.97     Bacteria, UA 10/29/2020 TRACE*    Crystals, UA 10/29/2020 NEG*    Hyaline Casts, UA 10/29/2020 4     WBC, UA 10/29/2020 9*    RBC, UA 10/29/2020 5*    Epithelial Cells, UA 10/29/2020 15            ASSESSMENT/PLAN:  Exposure to COVID-19 virus    Cough    Sinus congestion      Minimal cough  Mild sinus congestion  Covid negative test on 11/13  Her son has tested yesterday, results still pending  At this time recommend patient to self isolate until her son's test results are back  Take Zyrtec OTC to help sinus drainage  Albuterol inhaler sent in to help with coughing even though her cough seems to be quite minimal  If any change in her condition she will let us know    If sx not improving and resolving , if sx continue or re-occur pt has been instructed to call us and / or return here for follow- up evaluation      RX  -     albuterol sulfate HFA (VENTOLIN HFA) 108 (90 Base) MCG/ACT inhaler; Inhale 2 puffs into the lungs 4 times daily as needed for Wheezing    No orders of the defined types were placed in this encounter. New Prescriptions    ALBUTEROL SULFATE HFA (VENTOLIN HFA) 108 (90 BASE) MCG/ACT INHALER    Inhale 2 puffs into the lungs 4 times daily as needed for Wheezing         No follow-ups on file. There are no Patient Instructions on file for this visit. EMR Dragon/transcription disclaimer:Significant part of this  encounter note is electronic transcription/translationof spoken language to printed text. The electronic translation of spoken language may be erroneous, or at times, nonsensical words or phrases may be inadvertently transcribed.  Although I have reviewed the note for sucherrors, some may still exist.

## 2021-02-15 ENCOUNTER — TELEPHONE (OUTPATIENT)
Dept: INTERNAL MEDICINE | Age: 41
End: 2021-02-15

## 2021-02-15 RX ORDER — FLUCONAZOLE 150 MG/1
150 TABLET ORAL DAILY
Qty: 3 TABLET | Refills: 0 | Status: SHIPPED | OUTPATIENT
Start: 2021-02-15 | End: 2021-02-18

## 2021-02-22 ENCOUNTER — TELEPHONE (OUTPATIENT)
Dept: OBGYN CLINIC | Age: 41
End: 2021-02-22

## 2021-02-22 DIAGNOSIS — L65.9 ALOPECIA: Primary | ICD-10-CM

## 2021-02-22 NOTE — TELEPHONE ENCOUNTER
Pt seen Dr. Jerome Carter and he suspects hormonal.  Pt is off OCP since October. Pt will have labs faxed from Jerome Carter.

## 2021-02-22 NOTE — TELEPHONE ENCOUNTER
I added one more thyroid level. But I think Dr Beka Garcia ordered everything else for hair loss.  Thanks ML

## 2021-02-25 DIAGNOSIS — L65.9 ALOPECIA: ICD-10-CM

## 2021-02-25 LAB
ESTRADIOL LEVEL: 235.9 PG/ML
FOLLICLE STIMULATING HORMONE: 4.2 MIU/ML
LUTEINIZING HORMONE: 12.7 MIU/ML
PROGESTERONE LEVEL: <0.05 NG/ML
T4 FREE: 1.11 NG/DL (ref 0.93–1.7)

## 2021-02-26 ENCOUNTER — OFFICE VISIT (OUTPATIENT)
Dept: OBGYN CLINIC | Age: 41
End: 2021-02-26
Payer: COMMERCIAL

## 2021-02-26 VITALS
BODY MASS INDEX: 22.26 KG/M2 | HEART RATE: 96 BPM | WEIGHT: 121 LBS | SYSTOLIC BLOOD PRESSURE: 132 MMHG | HEIGHT: 62 IN | DIASTOLIC BLOOD PRESSURE: 85 MMHG

## 2021-02-26 DIAGNOSIS — L65.9 ALOPECIA: Primary | ICD-10-CM

## 2021-02-26 DIAGNOSIS — L65.9 ALOPECIA: ICD-10-CM

## 2021-02-26 LAB — VITAMIN D 25-HYDROXY: 28.6 NG/ML

## 2021-02-26 PROCEDURE — 99213 OFFICE O/P EST LOW 20 MIN: CPT | Performed by: NURSE PRACTITIONER

## 2021-02-26 ASSESSMENT — ENCOUNTER SYMPTOMS
GASTROINTESTINAL NEGATIVE: 1
DIARRHEA: 0
RESPIRATORY NEGATIVE: 1
ROS SKIN COMMENTS: HAIR LOSS
CONSTIPATION: 0
EYES NEGATIVE: 1
ALLERGIC/IMMUNOLOGIC NEGATIVE: 1

## 2021-02-26 NOTE — PROGRESS NOTES
Priya Laura is a 38 y.o. female who presents today for her medical conditions/ complaints as noted below. Priya Laura is c/o of Other (hormone check )        HPI  Pt c/o severely thinning hair. Just all over thinning and then bald spots around her forehead. Had some labs with dermatology and thinks its hormonal. Had hormone labs here. Cycles are regular. Currently CD 13. Feels like she may need to be back on OCP because this has helped before, but may want another baby. Patient's last menstrual period was 2021 (approximate).     Past Medical History:   Diagnosis Date    Essential hypertension 2017    Gastroesophageal reflux disease without esophagitis 2017    Generalized anxiety disorder      History reviewed. No pertinent surgical history. Family History   Problem Relation Age of Onset    Colon Cancer Father 48     Social History     Tobacco Use    Smoking status: Former Smoker    Smokeless tobacco: Never Used   Substance Use Topics    Alcohol use: Yes       No current outpatient medications on file. No current facility-administered medications for this visit. No Known Allergies  Vitals:    21 1122   BP: 132/85   Pulse: 96     Body mass index is 22.13 kg/m². Review of Systems   Constitutional: Negative. HENT: Negative. Eyes: Negative. Respiratory: Negative. Cardiovascular: Negative. Gastrointestinal: Negative. Negative for constipation and diarrhea. Endocrine: Negative. Genitourinary: Negative. Negative for frequency, menstrual problem and urgency. Musculoskeletal: Negative. Skin: Negative. Hair loss   Allergic/Immunologic: Negative. Neurological: Negative. Hematological: Negative. Psychiatric/Behavioral: Negative. All other systems reviewed and are negative. Physical Exam  Vitals signs and nursing note reviewed. Constitutional:       Appearance: She is well-developed. HENT:      Head: Normocephalic. Right Ear: External ear normal.      Left Ear: External ear normal.      Nose: Nose normal.   Neck:      Musculoskeletal: Normal range of motion. Musculoskeletal: Normal range of motion. Skin:     General: Skin is warm and dry. Neurological:      Mental Status: She is alert and oriented to person, place, and time. Psychiatric:         Attention and Perception: Attention normal.         Mood and Affect: Mood normal.         Speech: Speech normal.         Behavior: Behavior normal.         Thought Content: Thought content normal.         Cognition and Memory: Cognition normal.         Judgment: Judgment normal.          Diagnosis Orders   1. Alopecia         MEDICATIONS:  No orders of the defined types were placed in this encounter. ORDERS:  No orders of the defined types were placed in this encounter. PLAN:  Discussed labs, still waiting on Testosterone  Adding Vit D  May need OCP, but unsure since she may want to TTC    Patient Instructions     Patient Education        Hair Loss From Alopecia Areata: Care Instructions  Your Care Instructions     Alopecia areata is a type of hair loss that affects the hair on the scalp or other areas of the body. It's a problem that can go away for some time and then come back. This condition is most common in people who are younger than 21, but it can happen to children and adults of any age. Hair loss can affect how you feel about yourself. Your hair may fall out in clumps and grow back over time. In rare cases, a person with alopecia may lose all body hair. The pattern of hair loss and growth is different for everyone. You can treat alopecia with medicine, but treatment does not always work. You may have shots of medicine in your scalp or skin, take pills, or put the medicine on your scalp or skin. Or you may decide to wait and see whether your hair grows again before trying medicine.   Because hair loss is upsetting for most people, seek support from family and friends. Talk to a counselor or other professional if you need more help. Follow-up care is a key part of your treatment and safety. Be sure to make and go to all appointments, and call your doctor if you are having problems. It's also a good idea to know your test results and keep a list of the medicines you take. How can you care for yourself at home? · If you decide to treat your hair loss, use medicines exactly as prescribed. Call your doctor if you think you are having a problem with your medicine. · If you want to cover your scalp, you can use hats, scarves, or other head coverings. Or you may want to wear a hairpiece or a wig. · Try hair care products and styling techniques. Hair care products or perms may make hair appear thicker. You can use dyes to color the scalp. But long-term use of perms or dyes may lead to more hair loss. · Talk to your doctor if you are very upset about your hair loss. You can get counseling to help you cope with the condition. When should you call for help? Watch closely for changes in your health, and be sure to contact your doctor if:    · You do not get better as expected. Where can you learn more? Go to https://[email or web address] MediapeCellceutixeb.[employer]ka. org and sign in to your [employer] account. Enter V787 in the [employer] box to learn more about \"Hair Loss From Alopecia Areata: Care Instructions. \"     If you do not have an account, please click on the \"Sign Up Now\" link. Current as of: July 2, 2020               Content Version: 12.6  © 7686-5879 [employer]. Care instructions adapted under license by [facility] ([facility]). If you have questions about a medical condition or this instruction, always ask your healthcare professional. [address] any warranty or liability for your use of this information.

## 2021-02-26 NOTE — PROGRESS NOTES
Pt is wanting her hormones checked she states that her hair is thinning out extremely bad. She has stopped her b/c and was thinking about having another baby. Her hair started coming out after that. She had her last baby at 43years old. She was on b/c for 20 years and then stopped and this happened with her hair but got pregnant and hair grew back.

## 2021-02-26 NOTE — PATIENT INSTRUCTIONS
condition. When should you call for help? Watch closely for changes in your health, and be sure to contact your doctor if:    · You do not get better as expected. Where can you learn more? Go to https://Akanoopevipineb.[email or web address] org and sign in to your [employer] account. Enter E542 in the [facility] box to learn more about \"Hair Loss From Alopecia Areata: Care Instructions. \"     If you do not have an account, please click on the \"Sign Up Now\" link. Current as of: July 2, 2020               Content Version: 12.6  © 8852-0699 [employer]. Care instructions adapted under license by [facility] ([facility]). If you have questions about a medical condition or this instruction, always ask your healthcare professional. [address] any warranty or liability for your use of this information.

## 2021-03-01 LAB — TESTOSTERONE, FEMALES/CHILDREN: 20 NG/DL (ref 9–55)

## 2021-03-01 RX ORDER — ERGOCALCIFEROL 1.25 MG/1
50000 CAPSULE ORAL WEEKLY
Qty: 4 CAPSULE | Refills: 1 | Status: SHIPPED | OUTPATIENT
Start: 2021-03-01 | End: 2021-10-21

## 2021-03-04 ENCOUNTER — VIRTUAL VISIT (OUTPATIENT)
Dept: INTERNAL MEDICINE | Age: 41
End: 2021-03-04
Payer: COMMERCIAL

## 2021-03-04 DIAGNOSIS — J01.40 ACUTE NON-RECURRENT PANSINUSITIS: ICD-10-CM

## 2021-03-04 PROCEDURE — 99214 OFFICE O/P EST MOD 30 MIN: CPT | Performed by: INTERNAL MEDICINE

## 2021-03-04 RX ORDER — AMOXICILLIN AND CLAVULANATE POTASSIUM 875; 125 MG/1; MG/1
1 TABLET, FILM COATED ORAL 2 TIMES DAILY
Qty: 20 TABLET | Refills: 0 | Status: SHIPPED | OUTPATIENT
Start: 2021-03-04 | End: 2021-03-14

## 2021-03-04 ASSESSMENT — ENCOUNTER SYMPTOMS
WHEEZING: 0
VOMITING: 0
SINUS PRESSURE: 1
BLOOD IN STOOL: 0
EYE ITCHING: 0
SORE THROAT: 1
BACK PAIN: 0
SHORTNESS OF BREATH: 0
ABDOMINAL DISTENTION: 0
EYE DISCHARGE: 0
TROUBLE SWALLOWING: 0
NAUSEA: 0
ABDOMINAL PAIN: 0
SINUS PAIN: 1

## 2021-03-04 NOTE — PROGRESS NOTES
Psychiatric/Behavioral: Negative for agitation and hallucinations. The patient is not nervous/anxious.         Patient-Reported Vitals 11/17/2020   Patient-Reported Weight 120   Patient-Reported Height 5 2   Patient-Reported Systolic 340   Patient-Reported Diastolic 72   Patient-Reported Temperature 98.3        Physical Exam    [INSTRUCTIONS:  \"[x]\" Indicates a positive item  \"[]\" Indicates a negative item  -- DELETE ALL ITEMS NOT EXAMINED]    Constitutional: [x] Appears well-developed and well-nourished [x] No apparent distress      [] Abnormal -     Mental status: [x] Alert and awake  [x] Oriented to person/place/time [x] Able to follow commands    [] Abnormal -     Eyes:   EOM    [x]  Normal    [] Abnormal -   Sclera  [x]  Normal    [] Abnormal -          Discharge [x]  None visible   [] Abnormal -     HENT: [x] Normocephalic, atraumatic  [] Abnormal -   [x] Mouth/Throat: Mucous membranes are moist    External Ears [x] Normal  [] Abnormal -    Neck: [x] No visualized mass [] Abnormal -     Pulmonary/Chest: [x] Respiratory effort normal   [x] No visualized signs of difficulty breathing or respiratory distress        [] Abnormal -      Musculoskeletal:   [x] Normal gait with no signs of ataxia         [x] Normal range of motion of neck        [] Abnormal -     Neurological:        [x] No Facial Asymmetry (Cranial nerve 7 motor function) (limited exam due to video visit)          [x] No gaze palsy        [] Abnormal -          Skin:        [x] No significant exanthematous lesions or discoloration noted on facial skin         [] Abnormal -            Psychiatric:       [x] Normal Affect [] Abnormal -        [x] No Hallucinations    Other pertinent observable physical exam findings:- [place], was evaluated through a synchronous (real-time) audio-video encounter. The patient (or guardian if applicable) is aware that this is a billable service. Verbal consent to proceed has been obtained within the past 12 months. The visit was conducted pursuant to the emergency declaration under the [address] [place] [address] authority and the [employer] General Act. Patient identification was verified, and a caregiver was present when appropriate. The patient was located in a state where the provider was credentialed to provide care. An electronic signature was used to authenticate this note.     --Leonardo Weinberg MD

## 2021-03-19 ENCOUNTER — TELEPHONE (OUTPATIENT)
Dept: OBGYN CLINIC | Age: 41
End: 2021-03-19

## 2021-03-19 RX ORDER — NORGESTIMATE AND ETHINYL ESTRADIOL 0.25-0.035
1 KIT ORAL DAILY
Qty: 1 PACKET | Refills: 6 | Status: SHIPPED | OUTPATIENT
Start: 2021-03-19 | End: 2021-07-21

## 2021-03-19 NOTE — TELEPHONE ENCOUNTER
Pt called lm on vm wanting a BC called into her pharm but wasn't sure what you wanted to start her on.

## 2021-05-13 ENCOUNTER — TELEPHONE (OUTPATIENT)
Dept: INTERNAL MEDICINE | Age: 41
End: 2021-05-13

## 2021-05-13 DIAGNOSIS — R39.89 SENSATION OF PRESSURE IN BLADDER AREA: ICD-10-CM

## 2021-05-13 DIAGNOSIS — R39.15 URGENCY OF URINATION: Primary | ICD-10-CM

## 2021-05-13 DIAGNOSIS — R39.15 URGENCY OF URINATION: ICD-10-CM

## 2021-05-13 LAB
BACTERIA: NEGATIVE /HPF
BILIRUBIN URINE: NEGATIVE
BLOOD, URINE: ABNORMAL
CLARITY: CLEAR
COLOR: YELLOW
CRYSTALS, UA: ABNORMAL /HPF
EPITHELIAL CELLS, UA: 2 /HPF (ref 0–5)
GLUCOSE URINE: NEGATIVE MG/DL
HYALINE CASTS: 3 /HPF (ref 0–8)
KETONES, URINE: ABNORMAL MG/DL
LEUKOCYTE ESTERASE, URINE: NEGATIVE
NITRITE, URINE: NEGATIVE
PH UA: 5.5 (ref 5–8)
PROTEIN UA: NEGATIVE MG/DL
RBC UA: 12 /HPF (ref 0–4)
SPECIFIC GRAVITY UA: 1.03 (ref 1–1.03)
UROBILINOGEN, URINE: 1 E.U./DL
WBC UA: 1 /HPF (ref 0–5)

## 2021-05-13 RX ORDER — PHENAZOPYRIDINE HYDROCHLORIDE 200 MG/1
200 TABLET, FILM COATED ORAL 3 TIMES DAILY PRN
Qty: 6 TABLET | Refills: 0 | Status: SHIPPED | OUTPATIENT
Start: 2021-05-13 | End: 2021-05-15

## 2021-05-13 RX ORDER — CIPROFLOXACIN 250 MG/1
250 TABLET, FILM COATED ORAL 2 TIMES DAILY
Qty: 10 TABLET | Refills: 0 | Status: SHIPPED | OUTPATIENT
Start: 2021-05-13 | End: 2021-05-18

## 2021-05-13 RX ORDER — FLUCONAZOLE 150 MG/1
150 TABLET ORAL DAILY
Qty: 3 TABLET | Refills: 0 | Status: SHIPPED | OUTPATIENT
Start: 2021-05-13 | End: 2021-05-16

## 2021-05-13 NOTE — TELEPHONE ENCOUNTER
PT states that she noticed UTI symptoms this morning. States that she has some urination urgency, and bladder pressure. Labs have been ordered. Pt would like to go ahead and start a medication now   Please send it to [facility] on [address].

## 2021-05-15 LAB — URINE CULTURE, ROUTINE: NORMAL

## 2021-05-28 ENCOUNTER — VIRTUAL VISIT (OUTPATIENT)
Dept: INTERNAL MEDICINE | Age: 41
End: 2021-05-28
Payer: COMMERCIAL

## 2021-05-28 DIAGNOSIS — J01.40 ACUTE NON-RECURRENT PANSINUSITIS: Primary | ICD-10-CM

## 2021-05-28 PROCEDURE — 99213 OFFICE O/P EST LOW 20 MIN: CPT | Performed by: INTERNAL MEDICINE

## 2021-05-28 RX ORDER — FLUTICASONE PROPIONATE 50 MCG
2 SPRAY, SUSPENSION (ML) NASAL DAILY
Qty: 1 BOTTLE | Refills: 0 | Status: SHIPPED | OUTPATIENT
Start: 2021-05-28

## 2021-05-28 RX ORDER — AMOXICILLIN AND CLAVULANATE POTASSIUM 875; 125 MG/1; MG/1
1 TABLET, FILM COATED ORAL 2 TIMES DAILY
Qty: 20 TABLET | Refills: 0 | Status: SHIPPED | OUTPATIENT
Start: 2021-05-28 | End: 2021-06-07

## 2021-05-28 ASSESSMENT — ENCOUNTER SYMPTOMS
VOMITING: 0
BACK PAIN: 0
EYE ITCHING: 0
NAUSEA: 0
WHEEZING: 0
SHORTNESS OF BREATH: 0
BLOOD IN STOOL: 0
SINUS PRESSURE: 1
TROUBLE SWALLOWING: 0
ABDOMINAL PAIN: 0
SORE THROAT: 1
ABDOMINAL DISTENTION: 0
EYE DISCHARGE: 0

## 2021-05-28 NOTE — PROGRESS NOTES
Arlette Steel (:  1980) is a 38 y.o. female,Established patient, here for evaluation of the following chief complaint(s): No chief complaint on file. And identified by her picture in the chart and agreed to the virtual visit. ASSESSMENT/PLAN:  1. Acute non-recurrent pansinusitis    Center and a prescription for amoxicillin in the form of Augmentin 875 twice daily for 10 days. Have also sent in Flonase spray. She is to keep her scheduled appointment. No follow-ups on file. SUBJECTIVE/OBJECTIVE:  Patient of Dr. Kaylene Che who called in for virtual visit today because of sinus congestion and sore throat. She denies any fever chills but just does not feel well. Review of Systems   Constitutional: Negative for activity change, appetite change, fatigue and fever. HENT: Positive for congestion, postnasal drip, sinus pressure and sore throat. Negative for hearing loss and trouble swallowing. Eyes: Negative for discharge and itching. Respiratory: Negative for shortness of breath and wheezing. Cardiovascular: Negative for chest pain, palpitations and leg swelling. Gastrointestinal: Negative for abdominal distention, abdominal pain, blood in stool, nausea and vomiting. Endocrine: Negative for cold intolerance, heat intolerance and polydipsia. Genitourinary: Negative for flank pain, frequency, hematuria and urgency. Musculoskeletal: Negative for arthralgias, back pain and joint swelling. Skin: Negative for rash and wound. Allergic/Immunologic: Negative for environmental allergies and food allergies. Neurological: Negative for dizziness, tremors, syncope, weakness, numbness and headaches. Hematological: Negative for adenopathy. Psychiatric/Behavioral: Negative for agitation and hallucinations. The patient is not nervous/anxious.         Patient-Reported Vitals 2020   Patient-Reported Weight 120   Patient-Reported Height 5 2   Patient-Reported Systolic 012 Patient-Reported Diastolic 72   Patient-Reported Temperature 98.3        Physical Exam    [INSTRUCTIONS:  \"[x]\" Indicates a positive item  \"[]\" Indicates a negative item  -- DELETE ALL ITEMS NOT EXAMINED]    Constitutional: [x] Appears well-developed and well-nourished [x] No apparent distress      [] Abnormal -   mental status: [x] Alert and awake  [x] Oriented to person/place/time [x] Able to follow commands    [] Abnormal -     Eyes:   EOM    [x]  Normal    [] Abnormal -   Sclera  [x]  Normal    [] Abnormal -          Discharge [x]  None visible   [] Abnormal -     HENT: [x] Normocephalic, atraumatic  [] Abnormal -   [x] Mouth/Throat: Mucous membranes are moist  Sounds congested in her head and nose external Ears [x] Normal  [] Abnormal -    Neck: [x] No visualized mass [] Abnormal -     Pulmonary/Chest: [x] Respiratory effort normal   [x] No visualized signs of difficulty breathing or respiratory distress        [] Abnormal -      Musculoskeletal:   [x] Normal gait with no signs of ataxia         [x] Normal range of motion of neck        [] Abnormal -     Neurological:        [x] No Facial Asymmetry (Cranial nerve 7 motor function) (limited exam due to video visit)          [x] No gaze palsy        [] Abnormal -          Skin:        [x] No significant exanthematous lesions or discoloration noted on facial skin         [] Abnormal -            Psychiatric:       [x] Normal Affect [] Abnormal -        [x] No Hallucinations    Other pertinent observable physical exam findings:-                Michael Cyr, was evaluated through a synchronous (real-time) audio-video encounter. The patient (or guardian if applicable) is aware that this is a billable service. Verbal consent to proceed has been obtained within the past 12 months.  The visit was conducted pursuant to the emergency declaration under the [address], [address] waiver authority and the [employer] Supplemental Appropriations Act. Patient identification was verified, and a caregiver was present when appropriate. The patient was located in a state where the provider was credentialed to provide care. An electronic signature was used to authenticate this note.     --Bari Santos MD

## 2021-06-16 PROCEDURE — 87081 CULTURE SCREEN ONLY: CPT | Performed by: NURSE PRACTITIONER

## 2021-06-16 PROCEDURE — 87637 SARSCOV2&INF A&B&RSV AMP PRB: CPT | Performed by: NURSE PRACTITIONER

## 2021-06-18 ENCOUNTER — OFFICE VISIT (OUTPATIENT)
Dept: INTERNAL MEDICINE | Age: 41
End: 2021-06-18
Payer: COMMERCIAL

## 2021-06-18 VITALS
HEART RATE: 86 BPM | HEIGHT: 62 IN | BODY MASS INDEX: 21.9 KG/M2 | SYSTOLIC BLOOD PRESSURE: 112 MMHG | DIASTOLIC BLOOD PRESSURE: 82 MMHG | WEIGHT: 119 LBS | OXYGEN SATURATION: 98 % | RESPIRATION RATE: 18 BRPM

## 2021-06-18 DIAGNOSIS — J02.9 SORE THROAT: ICD-10-CM

## 2021-06-18 DIAGNOSIS — R50.9 FEVER, UNSPECIFIED FEVER CAUSE: ICD-10-CM

## 2021-06-18 DIAGNOSIS — J02.9 ACUTE PHARYNGITIS, UNSPECIFIED ETIOLOGY: Primary | ICD-10-CM

## 2021-06-18 PROCEDURE — 99213 OFFICE O/P EST LOW 20 MIN: CPT | Performed by: INTERNAL MEDICINE

## 2021-06-18 RX ORDER — AMOXICILLIN AND CLAVULANATE POTASSIUM 250; 125 MG/1; MG/1
1 TABLET, FILM COATED ORAL 3 TIMES DAILY
COMMUNITY
End: 2021-08-23 | Stop reason: CLARIF

## 2021-06-18 RX ORDER — PREDNISONE 10 MG/1
10 TABLET ORAL 2 TIMES DAILY
Qty: 6 TABLET | Refills: 0 | Status: SHIPPED | OUTPATIENT
Start: 2021-06-18 | End: 2021-06-21

## 2021-06-18 RX ORDER — FLUCONAZOLE 150 MG/1
150 TABLET ORAL DAILY
Qty: 2 TABLET | Refills: 0 | Status: SHIPPED | OUTPATIENT
Start: 2021-06-18 | End: 2021-06-20

## 2021-06-18 SDOH — ECONOMIC STABILITY: FOOD INSECURITY: WITHIN THE PAST 12 MONTHS, THE FOOD YOU BOUGHT JUST DIDN'T LAST AND YOU DIDN'T HAVE MONEY TO GET MORE.: NEVER TRUE

## 2021-06-18 SDOH — ECONOMIC STABILITY: FOOD INSECURITY: WITHIN THE PAST 12 MONTHS, YOU WORRIED THAT YOUR FOOD WOULD RUN OUT BEFORE YOU GOT MONEY TO BUY MORE.: NEVER TRUE

## 2021-06-18 ASSESSMENT — ENCOUNTER SYMPTOMS
CHEST TIGHTNESS: 0
WHEEZING: 0
SORE THROAT: 1
COUGH: 0
ABDOMINAL PAIN: 0
CONSTIPATION: 0

## 2021-06-18 ASSESSMENT — PATIENT HEALTH QUESTIONNAIRE - PHQ9
SUM OF ALL RESPONSES TO PHQ QUESTIONS 1-9: 0
SUM OF ALL RESPONSES TO PHQ QUESTIONS 1-9: 0
SUM OF ALL RESPONSES TO PHQ9 QUESTIONS 1 & 2: 0
SUM OF ALL RESPONSES TO PHQ QUESTIONS 1-9: 0
1. LITTLE INTEREST OR PLEASURE IN DOING THINGS: 0
2. FEELING DOWN, DEPRESSED OR HOPELESS: 0

## 2021-06-18 ASSESSMENT — SOCIAL DETERMINANTS OF HEALTH (SDOH): HOW HARD IS IT FOR YOU TO PAY FOR THE VERY BASICS LIKE FOOD, HOUSING, MEDICAL CARE, AND HEATING?: NOT HARD AT ALL

## 2021-06-18 NOTE — PROGRESS NOTES
Conjunctiva/sclera: Conjunctivae normal.      Pupils: Pupils are equal, round, and reactive to light. Neck:      Thyroid: No thyromegaly. Vascular: No JVD. Cardiovascular:      Rate and Rhythm: Normal rate. Heart sounds: Normal heart sounds. No murmur heard. Pulmonary:      Effort: No respiratory distress. Breath sounds: Normal breath sounds. No wheezing or rales. Chest:      Chest wall: No tenderness. Abdominal:      General: Bowel sounds are normal.      Palpations: Abdomen is soft. Musculoskeletal:         General: Normal range of motion. Cervical back: Neck supple. Lymphadenopathy:      Cervical: No cervical adenopathy. Skin:     General: Skin is warm. Findings: No rash. Neurological:      Mental Status: She is oriented to person, place, and time. Lab Review   Orders Only on 05/13/2021   Component Date Value    Urine Culture, Routine 05/13/2021 <50,000 CFU/ml mixed skin/urogenital yaz.  No further workup     Color, UA 05/13/2021 YELLOW     Clarity, UA 05/13/2021 Clear     Glucose, Ur 05/13/2021 Negative     Bilirubin Urine 05/13/2021 Negative     Ketones, Urine 05/13/2021 TRACE*    Specific Gravity, UA 05/13/2021 1.027     Blood, Urine 05/13/2021 MODERATE*    pH, UA 05/13/2021 5.5     Protein, UA 05/13/2021 Negative     Urobilinogen, Urine 05/13/2021 1.0     Nitrite, Urine 05/13/2021 Negative     Leukocyte Esterase, Urine 05/13/2021 Negative     Bacteria, UA 05/13/2021 NEGATIVE*    Crystals, UA 05/13/2021 NEG*    Hyaline Casts, UA 05/13/2021 3     WBC, UA 05/13/2021 1     RBC, UA 05/13/2021 12*    Epithelial Cells, UA 05/13/2021 2            ASSESSMENT/PLAN:  J    Acute pharyngitis    Sore throat    Fever  Sick since 6/16  [facility] refused to see her  Had to go to [facility]    SX  Body aches  Fever  Body aches  Fever 101      [facility]en [facility]  Testing   negative strep  COVID-19, FLU A/B, RSV PCR - Swab, Nasopharynx  Specimen:  Swab - Nasopharyngeal structure (body structure)   Ref Range & Units 2 d ago   [address] Not Detected - Ref. Range  Not Detected        Influenza A PCR Not Detected  Not Detected        Influenza B PCR Not Detected  Not Detected        RSV, PCR Not Detected  Not Detected        [facility] [facility]   Narrative      States still low grade fever but better  Still very sore throat  Recommendations  Increase fluids  Complete Augmentin course that she started on 6/16  Additional Rx  -     fluconazole (DIFLUCAN) 150 MG tablet; Take 1 tablet by mouth daily for 2 days  -     predniSONE (DELTASONE) 10 MG tablet; Take 1 tablet by mouth 2 times daily for 3 days  Work excuse for patient to return on 6/21/2021  If sx not improving and resolving , if sx continue or re-occur pt has been instructed to call us and / or return here for follow- up evaluation              No orders of the defined types were placed in this encounter. New Prescriptions    FLUCONAZOLE (DIFLUCAN) 150 MG TABLET    Take 1 tablet by mouth daily for 2 days    PREDNISONE (DELTASONE) 10 MG TABLET    Take 1 tablet by mouth 2 times daily for 3 days         No follow-ups on file. There are no Patient Instructions on file for this visit. EMR Dragon/transcription disclaimer:Significant part of this  encounter note is electronic transcription/translationof spoken language to printed text. The electronic translation of spoken language may be erroneous, or at times, nonsensical words or phrases may be inadvertently transcribed.  Although I have reviewed the note for sucherrors, some may still exist.

## 2021-07-19 ENCOUNTER — TELEPHONE (OUTPATIENT)
Dept: INTERNAL MEDICINE | Age: 41
End: 2021-07-19

## 2021-07-19 DIAGNOSIS — R31.29 MICROHEMATURIA: Primary | ICD-10-CM

## 2021-07-19 DIAGNOSIS — R31.29 MICROHEMATURIA: ICD-10-CM

## 2021-07-19 LAB
BACTERIA: NEGATIVE /HPF
BILIRUBIN URINE: NEGATIVE
BLOOD, URINE: ABNORMAL
CLARITY: ABNORMAL
COLOR: YELLOW
CRYSTALS, UA: ABNORMAL /HPF
EPITHELIAL CELLS, UA: 1 /HPF (ref 0–5)
GLUCOSE URINE: NEGATIVE MG/DL
HYALINE CASTS: 11 /HPF (ref 0–8)
KETONES, URINE: NEGATIVE MG/DL
LEUKOCYTE ESTERASE, URINE: ABNORMAL
NITRITE, URINE: NEGATIVE
PH UA: 7.5 (ref 5–8)
PROTEIN UA: ABNORMAL MG/DL
RBC UA: 82 /HPF (ref 0–4)
SPECIFIC GRAVITY UA: 1.02 (ref 1–1.03)
UROBILINOGEN, URINE: 0.2 E.U./DL
WBC UA: 111 /HPF (ref 0–5)

## 2021-07-19 RX ORDER — CIPROFLOXACIN 250 MG/1
250 TABLET, FILM COATED ORAL 2 TIMES DAILY
Qty: 10 TABLET | Refills: 0 | Status: SHIPPED | OUTPATIENT
Start: 2021-07-19 | End: 2021-07-24

## 2021-07-21 LAB
ORGANISM: ABNORMAL
ORGANISM: ABNORMAL
URINE CULTURE, ROUTINE: ABNORMAL

## 2021-08-09 ENCOUNTER — TELEPHONE (OUTPATIENT)
Dept: OBGYN CLINIC | Age: 41
End: 2021-08-09

## 2021-08-09 NOTE — TELEPHONE ENCOUNTER
Pt states Sprintec refill was sent in but not the same brand as usual. Informed pt that her pharmacy must of changes brands. Pt will contact pharmacy is see if she can get Sprintec brand.

## 2021-08-23 ENCOUNTER — OFFICE VISIT (OUTPATIENT)
Dept: INTERNAL MEDICINE | Age: 41
End: 2021-08-23
Payer: COMMERCIAL

## 2021-08-23 VITALS
RESPIRATION RATE: 18 BRPM | HEART RATE: 99 BPM | DIASTOLIC BLOOD PRESSURE: 88 MMHG | WEIGHT: 124 LBS | BODY MASS INDEX: 22.82 KG/M2 | HEIGHT: 62 IN | SYSTOLIC BLOOD PRESSURE: 138 MMHG

## 2021-08-23 DIAGNOSIS — F43.9 SITUATIONAL STRESS: ICD-10-CM

## 2021-08-23 DIAGNOSIS — Z11.59 NEED FOR HEPATITIS C SCREENING TEST: ICD-10-CM

## 2021-08-23 DIAGNOSIS — R03.0 ELEVATED BLOOD PRESSURE READING: Primary | ICD-10-CM

## 2021-08-23 PROCEDURE — 99213 OFFICE O/P EST LOW 20 MIN: CPT | Performed by: INTERNAL MEDICINE

## 2021-08-23 RX ORDER — METOPROLOL SUCCINATE 25 MG/1
25 TABLET, EXTENDED RELEASE ORAL DAILY
Qty: 30 TABLET | Refills: 0 | Status: SHIPPED | OUTPATIENT
Start: 2021-08-23 | End: 2021-10-21 | Stop reason: CLARIF

## 2021-08-23 ASSESSMENT — ENCOUNTER SYMPTOMS
CHEST TIGHTNESS: 0
COUGH: 0
CONSTIPATION: 0
ABDOMINAL PAIN: 0
WHEEZING: 0
SORE THROAT: 0

## 2021-08-23 NOTE — PROGRESS NOTES
Chief Complaint   Patient presents with    Hypertension     BP Running high waking her up at night     History of presenting illness:  Susan Fu is a36 y.o. female who presents today for follow up on her chronic medical conditions as noted below. Under significant stress at work  Working many hours  Frequently has to do ultrasounds on Covid patients  Has been noticing elevated blood pressures and elevated heart rate  No prior history of hypertension  She is going on vacation in for 5 days and she is hoping this will help her    Denies any chest pains  Occasional palpitations but not frequent    Patient Active Problem List    Diagnosis Date Noted    Acute non-recurrent pansinusitis 03/04/2021    Essential hypertension 12/21/2017    Generalized anxiety disorder 08/23/2017    Gastroesophageal reflux disease without esophagitis 08/23/2017    Fibrocystic breast 03/15/2013    Cervical dysplasia, mild 02/27/2012     Past Medical History:   Diagnosis Date    Essential hypertension 12/21/2017    Gastroesophageal reflux disease without esophagitis 8/23/2017    Generalized anxiety disorder       No past surgical history on file. Current Outpatient Medications   Medication Sig Dispense Refill    metoprolol succinate (TOPROL XL) 25 MG extended release tablet Take 1 tablet by mouth daily 30 tablet 0    SPRINTEC 28 0.25-35 MG-MCG per tablet TAKE 1 TABLET DAILY 84 tablet 3    fluticasone (FLONASE) 50 MCG/ACT nasal spray 2 sprays by Each Nostril route daily 1 Bottle 0    vitamin D (ERGOCALCIFEROL) 1.25 MG (18301 UT) CAPS capsule Take 1 capsule by mouth once a week 4 capsule 1     No current facility-administered medications for this visit.      No Known Allergies  Social History     Tobacco Use    Smoking status: Former Smoker    Smokeless tobacco: Never Used   Substance Use Topics    Alcohol use: Yes      Family History   Problem Relation Age of Onset    Colon Cancer Father 48       Review of Systems Constitutional: Positive for fatigue. Negative for chills and fever. HENT: Negative for congestion, ear pain, nosebleeds, postnasal drip and sore throat. Respiratory: Negative for cough, chest tightness and wheezing. Cardiovascular: Negative for chest pain, palpitations and leg swelling. Gastrointestinal: Negative for abdominal pain and constipation. Genitourinary: Negative for dysuria and urgency. Musculoskeletal: Negative. Negative for arthralgias. Skin: Negative for rash. Neurological: Negative for dizziness and headaches. Psychiatric/Behavioral: The patient is nervous/anxious. Vitals:    08/23/21 1238 08/23/21 1256   BP: (!) 150/88 138/88   Pulse: 115 99   Resp: 18    Weight: 124 lb (56.2 kg)    Height: 5' 2\" (1.575 m)      Body mass index is 22.68 kg/m². Physical Exam  Constitutional:       Appearance: She is well-developed. HENT:      Mouth/Throat:      Pharynx: No oropharyngeal exudate. Eyes:      Conjunctiva/sclera: Conjunctivae normal.      Pupils: Pupils are equal, round, and reactive to light. Neck:      Thyroid: No thyromegaly. Vascular: No JVD. Cardiovascular:      Rate and Rhythm: Normal rate. Heart sounds: No murmur heard. Pulmonary:      Effort: No respiratory distress. Breath sounds: Normal breath sounds. No wheezing or rales. Chest:      Chest wall: No tenderness. Abdominal:      General: Bowel sounds are normal.      Palpations: Abdomen is soft. Musculoskeletal:      Cervical back: Neck supple. Lymphadenopathy:      Cervical: No cervical adenopathy. Skin:     Findings: No rash.          Lab Review   Orders Only on 07/19/2021   Component Date Value    Color, UA 07/19/2021 YELLOW     Clarity, UA 07/19/2021 CLOUDY*    Glucose, Ur 07/19/2021 Negative     Bilirubin Urine 07/19/2021 Negative     Ketones, Urine 07/19/2021 Negative     Specific Gravity, UA 07/19/2021 1.021     Blood, Urine 07/19/2021 MODERATE*    pH, UA 07/19/2021 7. 5     Protein, UA 07/19/2021 TRACE*    Urobilinogen, Urine 07/19/2021 0.2     Nitrite, Urine 07/19/2021 Negative     Leukocyte Esterase, Urine 07/19/2021 MODERATE*    Urine Culture, Routine 07/19/2021 >50,000 CFU/ml*    Organism 07/19/2021 Staphylococcus saprophyticus*    Urine Culture, Routine 07/19/2021                      Value: Moderate growth  No further workup  Routine testing of urine isolates of Staph saprophyticus is  not advised because infection respond to concentration  achieved in urine of antimicrobial agents commonly used to  treat acute, uncomplicated urinary tract infections  (e.g. Nitrofurantoin, SXT, or a Fluoroquinolone)      Organism 07/19/2021 Klebsiella oxytoca*    Urine Culture, Routine 07/19/2021 Light growth     Bacteria, UA 07/19/2021 NEGATIVE*    Crystals, UA 07/19/2021 NEG*    Hyaline Casts, UA 07/19/2021 11*    WBC, UA 07/19/2021 111*    RBC, UA 07/19/2021 82*    Epithelial Cells, UA 07/19/2021 1            ASSESSMENT/PLAN:      Elevated blood pressure reading  Situational stress    Under significant stress at work  Working many hours  Frequently has to do ultrasounds on Covid patients  Has been noticing elevated blood pressures and elevated heart rate  No prior history of hypertension  She is going on vacation in for 5 days and she is hoping this will help her    Denies any chest pains  Occasional palpitations but not frequent    Recommend following  1. Stress reduction  2. May need to start part-time job/work  Has small child/overworked now with elevated blood pressures  3. Initiate metoprolol ER 25 mg half tablet daily  Likely will increase to 25 mg p.o. daily  4. Recommend Zio patch testing, this time patient wants to wait until he is back from vacation since she feels that rest will help her more than anything else          Other orders  -     metoprolol succinate (TOPROL XL) 25 MG extended release tablet;  Take 1 tablet by mouth daily              Orders Placed This Encounter   Procedures    Hepatitis C Antibody     New Prescriptions    METOPROLOL SUCCINATE (TOPROL XL) 25 MG EXTENDED RELEASE TABLET    Take 1 tablet by mouth daily         No follow-ups on file. There are no Patient Instructions on file for this visit. EMR Dragon/transcription disclaimer:Significant part of this  encounter note is electronic transcription/translationof spoken language to printed text. The electronic translation of spoken language may be erroneous, or at times, nonsensical words or phrases may be inadvertently transcribed.  Although I have reviewed the note for sucherrors, some may still exist.

## 2021-09-20 ENCOUNTER — TELEPHONE (OUTPATIENT)
Dept: INTERNAL MEDICINE | Age: 41
End: 2021-09-20

## 2021-09-20 ENCOUNTER — NURSE TRIAGE (OUTPATIENT)
Dept: OTHER | Facility: CLINIC | Age: 41
End: 2021-09-20

## 2021-09-20 NOTE — TELEPHONE ENCOUNTER
Pt called the metoprolol is making her really tired she was taking it in the morning then switched to taking it at night over a week ago and she is still feeling tired.   She is going to stop taking it and see if it gets better and keep a eye on her bp and HR  if it starts going up will let us know

## 2021-09-20 NOTE — TELEPHONE ENCOUNTER
Received call from [facility] at pre-service center [facility] with [employer] Complaint. Brief description of triage: as above c/o fatigue  For 2 weeks states started on Metoprolol 3 weeks ago and wondering if that could be reason no cp no sob    Triage indicates for patient to be seen today if unable to go to [facility]    Care advice provided, patient verbalizes understanding; denies any other questions or concerns; instructed to call back for any new or worsening symptoms. Writer provided warm transfer to [employer] at [facility] for appointment scheduling. Attention Provider: Thank you for allowing me to participate in the care of your patient. The patient was connected to triage in response to information provided to the [facility]. Please do not respond through this encounter as the response is not directed to a shared pool. Reason for Disposition   MODERATE weakness (i.e., interferes with work, school, normal activities) and persists > 3 days    Answer Assessment - Initial Assessment Questions  1. DESCRIPTION: \"Describe how you are feeling. \"      Tired no energy    2. SEVERITY: \"How bad is it? \"  \"Can you stand and walk? \"    - MILD - Feels weak or tired, but does not interfere with work, school or normal activities    - [facility] to stand and walk; weakness interferes with work, school, or normal activities    - SEVERE - Unable to stand or walk      Mild    3. ONSET:  \"When did the weakness begin? \"      2 weeks    4. CAUSE: \"What do you think is causing the weakness? \"      Possible metoprolol    5. MEDICINES: Mitchhao [place] you recently started a new medicine or had a change in the amount of a medicine? \"      Metoprolol    6. OTHER SYMPTOMS: \"Do you have any other symptoms? \" (e.g., chest pain, fever, cough, SOB, vomiting, diarrhea, bleeding, other areas of pain)      Denies    7. PREGNANCY: \"Is there any chance you are pregnant? \" \"When was your last menstrual period? \"    Protocols used: WEAKNESS (GENERALIZED) AND FATIGUE-ADULT-OH

## 2021-10-07 ENCOUNTER — TELEPHONE (OUTPATIENT)
Dept: INTERNAL MEDICINE | Age: 41
End: 2021-10-07

## 2021-10-07 NOTE — TELEPHONE ENCOUNTER
----- [facility] sent at 10/7/2021 12:25 PM CDT -----  Subject: Appointment Request    Reason for Call: Routine Physical Exam with PAP    QUESTIONS  Type of Appointment? Established Patient  Reason for appointment request? Available appointments did not meet   patient need  Additional Information for Provider? Patient is rescheduling appointment   with Crista Merino, that was canceled by provider please call to reschedule for   the month of October or beginning of November. patient was told someone   could call her back yesterday but no one has reached out.   ---------------------------------------------------------------------------  --------------  CALL BACK INFO  What is the best way for the office to contact you? OK to leave message on   voicemail  Preferred Call Back Phone Number? [phone]  ---------------------------------------------------------------------------  --------------  SCRIPT ANSWERS  Relationship to Patient? Self  (If the patient has Medicare as their primary insurance coverage ask this   question) Are you requesting a Medicare Annual Wellness Visit? No  (Is the patient requesting a pap smear with their physical exam?)? Yes  (Is the patient requesting their annual physical and does not need PAP or   AWV per above?)? No  Have you been diagnosed with, awaiting test results for, or told that you   are suspected of having COVID-19 (Coronavirus)? (If patient has tested   negative or was tested as a requirement for work, school, or travel and   not based on symptoms, answer no)? No  Within the past two weeks have you developed any of the following symptoms   (answer no if symptoms have been present longer than 2 weeks or began   more than 2 weeks ago)?  Fever or Chills, Cough, Shortness of breath or   difficulty breathing, Loss of taste or smell, Sore throat, Nasal   congestion, Sneezing or runny nose, Fatigue or generalized body aches   (answer no if pain is specific to a body part e.g. back pain), Diarrhea,   Headache? No  Have you had close contact with someone with COVID-19 in the last 14 days? No  (Service Expert  click yes below to proceed with [employer] As Usual   Scheduling)?  Yes

## 2021-10-20 DIAGNOSIS — Z23 NEED FOR IMMUNIZATION AGAINST INFLUENZA: ICD-10-CM

## 2021-10-20 DIAGNOSIS — Z11.59 NEED FOR HEPATITIS C SCREENING TEST: ICD-10-CM

## 2021-10-20 DIAGNOSIS — Z80.0 FAMILY HISTORY OF COLON CANCER: ICD-10-CM

## 2021-10-20 DIAGNOSIS — Z12.31 ENCOUNTER FOR SCREENING MAMMOGRAM FOR BREAST CANCER: ICD-10-CM

## 2021-10-20 DIAGNOSIS — R79.89 ELEVATED TSH: ICD-10-CM

## 2021-10-20 DIAGNOSIS — Z00.00 ANNUAL PHYSICAL EXAM: ICD-10-CM

## 2021-10-20 LAB
ALBUMIN SERPL-MCNC: 4.1 G/DL (ref 3.5–5.2)
ALP BLD-CCNC: 50 U/L (ref 35–104)
ALT SERPL-CCNC: 16 U/L (ref 5–33)
AMORPHOUS: ABNORMAL /HPF
ANION GAP SERPL CALCULATED.3IONS-SCNC: 8 MMOL/L (ref 7–19)
AST SERPL-CCNC: 14 U/L (ref 5–32)
BACTERIA: ABNORMAL /HPF
BASOPHILS ABSOLUTE: 0.1 K/UL (ref 0–0.2)
BASOPHILS RELATIVE PERCENT: 1 % (ref 0–1)
BILIRUB SERPL-MCNC: 0.4 MG/DL (ref 0.2–1.2)
BILIRUBIN URINE: NEGATIVE
BLOOD, URINE: ABNORMAL
BUN BLDV-MCNC: 19 MG/DL (ref 6–20)
CALCIUM SERPL-MCNC: 9.4 MG/DL (ref 8.6–10)
CHLORIDE BLD-SCNC: 101 MMOL/L (ref 98–111)
CHOLESTEROL, TOTAL: 185 MG/DL (ref 160–199)
CLARITY: ABNORMAL
CO2: 28 MMOL/L (ref 22–29)
COLOR: YELLOW
CREAT SERPL-MCNC: 0.7 MG/DL (ref 0.5–0.9)
CRYSTALS, UA: ABNORMAL /HPF
EOSINOPHILS ABSOLUTE: 0.3 K/UL (ref 0–0.6)
EOSINOPHILS RELATIVE PERCENT: 3.7 % (ref 0–5)
EPITHELIAL CELLS, UA: ABNORMAL /HPF
GFR AFRICAN AMERICAN: >59
GFR NON-AFRICAN AMERICAN: >60
GLUCOSE BLD-MCNC: 94 MG/DL (ref 74–109)
GLUCOSE URINE: NEGATIVE MG/DL
HCT VFR BLD CALC: 40.1 % (ref 37–47)
HDLC SERPL-MCNC: 68 MG/DL (ref 65–121)
HEMOGLOBIN: 12.9 G/DL (ref 12–16)
HEPATITIS C ANTIBODY INTERPRETATION: NORMAL
IMMATURE GRANULOCYTES #: 0 K/UL
KETONES, URINE: ABNORMAL MG/DL
LDL CHOLESTEROL CALCULATED: 96 MG/DL
LEUKOCYTE ESTERASE, URINE: ABNORMAL
LYMPHOCYTES ABSOLUTE: 2.9 K/UL (ref 1.1–4.5)
LYMPHOCYTES RELATIVE PERCENT: 39.6 % (ref 20–40)
MCH RBC QN AUTO: 29.9 PG (ref 27–31)
MCHC RBC AUTO-ENTMCNC: 32.2 G/DL (ref 33–37)
MCV RBC AUTO: 93 FL (ref 81–99)
MONOCYTES ABSOLUTE: 0.5 K/UL (ref 0–0.9)
MONOCYTES RELATIVE PERCENT: 6.9 % (ref 0–10)
NEUTROPHILS ABSOLUTE: 3.5 K/UL (ref 1.5–7.5)
NEUTROPHILS RELATIVE PERCENT: 48.5 % (ref 50–65)
NITRITE, URINE: NEGATIVE
PDW BLD-RTO: 12.4 % (ref 11.5–14.5)
PH UA: 7 (ref 5–8)
PLATELET # BLD: 283 K/UL (ref 130–400)
PMV BLD AUTO: 10.1 FL (ref 9.4–12.3)
POTASSIUM SERPL-SCNC: 4.3 MMOL/L (ref 3.5–5)
PROTEIN UA: NEGATIVE MG/DL
RBC # BLD: 4.31 M/UL (ref 4.2–5.4)
RBC UA: ABNORMAL /HPF (ref 0–2)
SODIUM BLD-SCNC: 137 MMOL/L (ref 136–145)
SPECIFIC GRAVITY UA: 1.02 (ref 1–1.03)
TOTAL PROTEIN: 6.6 G/DL (ref 6.6–8.7)
TRIGL SERPL-MCNC: 104 MG/DL (ref 0–149)
TSH SERPL DL<=0.05 MIU/L-ACNC: 4.06 UIU/ML (ref 0.27–4.2)
UROBILINOGEN, URINE: 0.2 E.U./DL
VITAMIN D 25-HYDROXY: 32.5 NG/ML
WBC # BLD: 7.3 K/UL (ref 4.8–10.8)
WBC UA: ABNORMAL /HPF (ref 0–5)

## 2021-10-21 ENCOUNTER — HOSPITAL ENCOUNTER (OUTPATIENT)
Dept: WOMENS IMAGING | Age: 41
Discharge: HOME | End: 2021-10-21
Payer: COMMERCIAL

## 2021-10-21 ENCOUNTER — OFFICE VISIT (OUTPATIENT)
Dept: INTERNAL MEDICINE | Age: 41
End: 2021-10-21
Payer: COMMERCIAL

## 2021-10-21 VITALS
WEIGHT: 123 LBS | HEIGHT: 62 IN | OXYGEN SATURATION: 98 % | DIASTOLIC BLOOD PRESSURE: 80 MMHG | RESPIRATION RATE: 18 BRPM | BODY MASS INDEX: 22.63 KG/M2 | SYSTOLIC BLOOD PRESSURE: 118 MMHG | HEART RATE: 86 BPM

## 2021-10-21 DIAGNOSIS — Z00.00 ANNUAL PHYSICAL EXAM: Primary | ICD-10-CM

## 2021-10-21 DIAGNOSIS — Z12.4 ENCOUNTER FOR PAPANICOLAOU SMEAR FOR CERVICAL CANCER SCREENING: ICD-10-CM

## 2021-10-21 DIAGNOSIS — F41.1 GENERALIZED ANXIETY DISORDER: ICD-10-CM

## 2021-10-21 DIAGNOSIS — Z12.31 ENCOUNTER FOR SCREENING MAMMOGRAM FOR BREAST CANCER: ICD-10-CM

## 2021-10-21 DIAGNOSIS — E55.9 VITAMIN D DEFICIENCY: ICD-10-CM

## 2021-10-21 DIAGNOSIS — I10 ESSENTIAL HYPERTENSION: ICD-10-CM

## 2021-10-21 DIAGNOSIS — K21.9 GASTROESOPHAGEAL REFLUX DISEASE WITHOUT ESOPHAGITIS: ICD-10-CM

## 2021-10-21 PROCEDURE — 99396 PREV VISIT EST AGE 40-64: CPT | Performed by: INTERNAL MEDICINE

## 2021-10-21 PROCEDURE — 77067 SCR MAMMO BI INCL CAD: CPT

## 2021-10-21 ASSESSMENT — ENCOUNTER SYMPTOMS
CHEST TIGHTNESS: 0
SINUS PRESSURE: 0
WHEEZING: 0
ABDOMINAL PAIN: 0
EYE PAIN: 0
COUGH: 0
SORE THROAT: 0
TROUBLE SWALLOWING: 0
EYE REDNESS: 0
VOICE CHANGE: 0
VOMITING: 0
CONSTIPATION: 0
DIARRHEA: 0
BLOOD IN STOOL: 0
COLOR CHANGE: 0
NAUSEA: 0

## 2021-10-21 NOTE — PROGRESS NOTES
Chief Complaint:   Myles Canales is a 42 y.o. female who presents forcomplete physical exam.    History of Present Illness:      Myles Canales is a 42 y.o. female who presents todayfor wellness visit AND follow up on her chronic medical conditions as noted below. Patient Active Problem List    Diagnosis Date Noted    Acute non-recurrent pansinusitis 03/04/2021    Essential hypertension 12/21/2017    Generalized anxiety disorder 08/23/2017    Gastroesophageal reflux disease without esophagitis 08/23/2017    Fibrocystic breast 03/15/2013    Cervical dysplasia, mild 02/27/2012       Past Medical History:   Diagnosis Date    Essential hypertension 12/21/2017    Gastroesophageal reflux disease without esophagitis 8/23/2017    Generalized anxiety disorder        No past surgical history on file. Current Outpatient Medications   Medication Sig Dispense Refill    SPRINTEC 28 0.25-35 MG-MCG per tablet TAKE 1 TABLET DAILY 84 tablet 3    fluticasone (FLONASE) 50 MCG/ACT nasal spray 2 sprays by Each Nostril route daily 1 Bottle 0     No current facility-administered medications for this visit. No Known Allergies    Social History     Socioeconomic History    Marital status:      Spouse name: None    Number of children: None    Years of education: None    Highest education level: None   Occupational History    Occupation: ultrasonographer   Tobacco Use    Smoking status: Former Smoker    Smokeless tobacco: Never Used   Substance and Sexual Activity    Alcohol use:  Yes    Drug use: No    Sexual activity: Yes     Partners: Male   Other Topics Concern    None   Social History Narrative    None     Social Determinants of Health     Financial Resource Strain: Low Risk     Difficulty of Paying Living Expenses: Not hard at all   Food Insecurity: No Food Insecurity    Worried About Running Out of Food in the Last Year: Never true    Chelsey of Food in the Last Year: Never true   Transportation Needs:     Lack of Transportation (Medical):  Lack of Transportation (Non-Medical):    Physical Activity:     Days of Exercise per Week:     Minutes of Exercise per Session:    Stress:     Feeling of Stress :    Social Connections:     Frequency of Communication with Friends and Family:     Frequency of Social Gatherings with Friends and Family:     Attends Muslim Services:     Active Member of Clubs or Organizations:     Attends Club or Organization Meetings:     Marital Status:    Intimate Partner Violence:     Fear of Current or Ex-Partner:     Emotionally Abused:     Physically Abused:     Sexually Abused:      Family History   Problem Relation Age of Onset    Colon Cancer Father 48        No past surgical history on file.       Lab Review   Orders Only on 10/20/2021   Component Date Value    Hep C Ab Interp 10/20/2021 Non-Reactive     Vit D, 25-Hydroxy 10/20/2021 32.5     TSH 10/20/2021 4.060     Color, UA 10/20/2021 YELLOW     Clarity, UA 10/20/2021 TURBID*    Glucose, Ur 10/20/2021 Negative     Bilirubin Urine 10/20/2021 Negative     Ketones, Urine 10/20/2021 TRACE*    Specific Decker, UA 10/20/2021 1.023     Blood, Urine 10/20/2021 TRACE*    pH, UA 10/20/2021 7.0     Protein, UA 10/20/2021 Negative     Urobilinogen, Urine 10/20/2021 0.2     Nitrite, Urine 10/20/2021 Negative     Leukocyte Esterase, Urine 10/20/2021 SMALL*    Cholesterol, Total 10/20/2021 185     Triglycerides 10/20/2021 104     HDL 10/20/2021 68     LDL Calculated 10/20/2021 96     Sodium 10/20/2021 137     Potassium 10/20/2021 4.3     Chloride 10/20/2021 101     CO2 10/20/2021 28     Anion Gap 10/20/2021 8     Glucose 10/20/2021 94     BUN 10/20/2021 19     CREATININE 10/20/2021 0.7     GFR Non- 10/20/2021 >60     GFR  10/20/2021 >59     Calcium 10/20/2021 9.4     Total Protein 10/20/2021 6.6     Albumin 10/20/2021 4.1     Total Bilirubin 10/20/2021 0.4  Alkaline Phosphatase 10/20/2021 50     ALT 10/20/2021 16     AST 10/20/2021 14     WBC 10/20/2021 7.3     RBC 10/20/2021 4.31     Hemoglobin 10/20/2021 12.9     Hematocrit 10/20/2021 40.1     MCV 10/20/2021 93.0     MCH 10/20/2021 29.9     MCHC 10/20/2021 32.2*    RDW 10/20/2021 12.4     Platelets 73/77/8221 283     MPV 10/20/2021 10.1     Neutrophils % 10/20/2021 48.5*    Lymphocytes % 10/20/2021 39.6     Monocytes % 10/20/2021 6.9     Eosinophils % 10/20/2021 3.7     Basophils % 10/20/2021 1.0     Neutrophils Absolute 10/20/2021 3.5     Immature Granulocytes # 10/20/2021 0.0     Lymphocytes Absolute 10/20/2021 2.9     Monocytes Absolute 10/20/2021 0.50     Eosinophils Absolute 10/20/2021 0.30     Basophils Absolute 10/20/2021 0.10     WBC, UA 10/20/2021 2-4     RBC, UA 10/20/2021 2-4     Epithelial Cells, UA 10/20/2021 3-5     Bacteria, UA 10/20/2021 Rare*    Amorphous, UA 10/20/2021 3+*    Crystals, UA 10/20/2021 Few*         Review of Systems   Constitutional: Negative for chills, fatigue and fever. HENT: Negative for congestion, ear pain, postnasal drip, sinus pressure, sore throat, trouble swallowing and voice change. Eyes: Negative for pain, redness and visual disturbance. Respiratory: Negative for cough, chest tightness and wheezing. Cardiovascular: Negative for chest pain, palpitations and leg swelling. Gastrointestinal: Negative for abdominal pain, blood in stool, constipation, diarrhea, nausea and vomiting. Endocrine: Negative for polydipsia and polyuria. Genitourinary: Negative for dysuria, enuresis, flank pain, frequency and urgency. Musculoskeletal: Negative for arthralgias, gait problem and joint swelling. Skin: Negative for color change and rash. Neurological: Negative for dizziness, tremors, syncope, facial asymmetry, speech difficulty, weakness, numbness and headaches.    Psychiatric/Behavioral: Negative for agitation, behavioral problems, confusion, sleep disturbance and suicidal ideas. The patient is not nervous/anxious. Vitals:    10/21/21 0736   BP: 118/80   Site: Left Upper Arm   Position: Sitting   Cuff Size: Large Adult   Pulse: 86   Resp: 18   SpO2: 98%   Weight: 123 lb (55.8 kg)   Height: 5' 2\" (1.575 m)      Wt Readings from Last 3 Encounters:   10/21/21 123 lb (55.8 kg)   08/23/21 124 lb (56.2 kg)   06/18/21 119 lb (54 kg)   Body mass index is 22.5 kg/m². BP Readings from Last 3 Encounters:   10/21/21 118/80   08/23/21 138/88   06/18/21 112/82       Physical Exam  Vitals and nursing note reviewed. Constitutional:       Appearance: She is well-developed. HENT:      Head: Normocephalic and atraumatic. Eyes:      Conjunctiva/sclera: Conjunctivae normal.      Pupils: Pupils are equal, round, and reactive to light. Neck:      Thyroid: No thyromegaly. Vascular: No JVD. Cardiovascular:      Rate and Rhythm: Normal rate and regular rhythm. Heart sounds: Normal heart sounds. No murmur heard. Pulmonary:      Effort: Pulmonary effort is normal. No respiratory distress. Breath sounds: Normal breath sounds. No wheezing or rales. Chest:      Chest wall: No tenderness. Abdominal:      General: Bowel sounds are normal. There is no distension. Palpations: Abdomen is soft. There is no mass. Tenderness: There is no abdominal tenderness. There is no guarding or rebound. Genitourinary:     Vagina: Normal. No signs of injury. No vaginal discharge. Cervix: No cervical motion tenderness or discharge. Uterus: Not deviated, not enlarged and not tender. Adnexa:         Right: No mass. Left: No mass. Musculoskeletal:         General: No tenderness or deformity. Normal range of motion. Cervical back: Normal range of motion and neck supple. Lymphadenopathy:      Cervical: No cervical adenopathy. Skin:     General: Skin is warm and dry. Coloration: Skin is not pale. Findings: No erythema or rash. Neurological:      Mental Status: She is alert and oriented to person, place, and time. Cranial Nerves: No cranial nerve deficit. Motor: No abnormal muscle tone. Deep Tendon Reflexes: Reflexes are normal and symmetric. Reflexes normal.   Psychiatric:         Behavior: Behavior normal.         Thought Content: Thought content normal.         Judgment: Judgment normal.       Breast exam  Bilateral breast exam- symmetric, no nodules, no lymphadenopathy, no nipple discharge            ASSESSMENT/PLAN      Annual physical exam     Plan mammogram -mammogram orders to the patient  Fasting lab results all discussed with the patient-fasting blood sugar and lipid panel are normal  PAP today    Essential hypertension  Currently blood pressure is in good range  No Rx is needed    Generalized anxiety disorder  Sx better      Vit D deficiency  Level now 34 ( was 28 )  Has not been taking vit D  Take vit d 2000 iu daily        Orders Placed This Encounter   Procedures    PAP SMEAR    Comprehensive Metabolic Panel    CBC Auto Differential    Lipid Panel    Vitamin D 25 Hydroxy    Urinalysis    TSH without Reflex     New Prescriptions    No medications on file      There are no Patient Instructions on file for this visit. Return in about 1 year (around 10/21/2022) for Annual Physical.   EMR Dragon/transcription disclaimer:Significant part of this  encounter note is electronic transcription/translation of spoken language to printed text. The electronic translation of spoken language may beerroneous, or at times, nonsensical words or phrases may be inadvertently transcribed.  Although I have reviewed the note for such errors, some may still exist.

## 2022-01-14 ENCOUNTER — OFFICE VISIT (OUTPATIENT)
Age: 42
End: 2022-01-14
Payer: COMMERCIAL

## 2022-01-14 VITALS
OXYGEN SATURATION: 99 % | WEIGHT: 126 LBS | DIASTOLIC BLOOD PRESSURE: 83 MMHG | HEIGHT: 62 IN | SYSTOLIC BLOOD PRESSURE: 131 MMHG | TEMPERATURE: 98.4 F | BODY MASS INDEX: 23.19 KG/M2 | HEART RATE: 93 BPM

## 2022-01-14 DIAGNOSIS — J02.9 PHARYNGITIS, UNSPECIFIED ETIOLOGY: ICD-10-CM

## 2022-01-14 DIAGNOSIS — J06.9 VIRAL URI: Primary | ICD-10-CM

## 2022-01-14 PROCEDURE — 99213 OFFICE O/P EST LOW 20 MIN: CPT | Performed by: NURSE PRACTITIONER

## 2022-01-14 ASSESSMENT — ENCOUNTER SYMPTOMS: SORE THROAT: 1

## 2022-01-14 NOTE — PATIENT INSTRUCTIONS
Patient Education        Upper Respiratory Infection (Cold): Care Instructions  Your Care Instructions     An upper respiratory infection, or URI, is an infection of the nose, sinuses, or throat. URIs are spread by coughs, sneezes, and direct contact. The common cold is the most frequent kind of URI. The flu and sinus infections are other kinds of URIs. Almost all URIs are caused by viruses. Antibiotics won't cure them. But you can treat most infections with home care. This may include drinking lots of fluids and taking over-the-counter pain medicine. You will probably feel better in 4 to 10 days. The doctor has checked you carefully, but problems can develop later. If you notice any problems or new symptoms, get medical treatment right away. Follow-up care is a key part of your treatment and safety. Be sure to make and go to all appointments, and call your doctor if you are having problems. It's also a good idea to know your test results and keep a list of the medicines you take. How can you care for yourself at home? · To prevent dehydration, drink plenty of fluids. Choose water and other clear liquids until you feel better. If you have kidney, heart, or liver disease and have to limit fluids, talk with your doctor before you increase the amount of fluids you drink. · Take an over-the-counter pain medicine, such as acetaminophen (Tylenol), ibuprofen (Advil, Motrin), or naproxen (Aleve). Read and follow all instructions on the label. · Before you use cough and cold medicines, check the label. These medicines may not be safe for young children or for people with certain health problems. · Be careful when taking over-the-counter cold or flu medicines and Tylenol at the same time. Many of these medicines have acetaminophen, which is Tylenol. Read the labels to make sure that you are not taking more than the recommended dose. Too much acetaminophen (Tylenol) can be harmful.   · Get plenty of rest.  · Do not smoke or allow others to smoke around you. If you need help quitting, talk to your doctor about stop-smoking programs and medicines. These can increase your chances of quitting for good. When should you call for help? Call 911 anytime you think you may need emergency care. For example, call if:    · You have severe trouble breathing. Call your doctor now or seek immediate medical care if:    · You seem to be getting much sicker.     · You have new or worse trouble breathing.     · You have a new or higher fever.     · You have a new rash. Watch closely for changes in your health, and be sure to contact your doctor if:    · You have a new symptom, such as a sore throat, an earache, or sinus pain.     · You cough more deeply or more often, especially if you notice more mucus or a change in the color of your mucus.     · You do not get better as expected. Where can you learn more? Go to https://Instabeat.[employer] W[employer] Transmission. org and sign in to your Radio Systemes Ingenierie account. Enter M308 in the JobFlash box to learn more about \"Upper Respiratory Infection (Cold): Care Instructions. \"     If you do not have an account, please click on the \"Sign Up Now\" link. Current as of: July 6, 2021               Content Version: 13.1  © 2006-2021 NuPathe. Care instructions adapted under license by [facility] [place] ([facility]). If you have questions about a medical condition or this instruction, always ask your healthcare professional. [place] [address] any warranty or liability for your use of this information. Patient Education        Sore Throat: Care Instructions  Your Care Instructions     Infection by bacteria or a virus causes most sore throats. Cigarette smoke, dry air, air pollution, allergies, and yelling can also cause a sore throat. Sore throats can be painful and annoying. Fortunately, most sore throats go away on their own.  If you have a bacterial infection, your doctor may prescribe antibiotics. Follow-up care is a key part of your treatment and safety. Be sure to make and go to all appointments, and call your doctor if you are having problems. It's also a good idea to know your test results and keep a list of the medicines you take. How can you care for yourself at home? · If your doctor prescribed antibiotics, take them as directed. Do not stop taking them just because you feel better. You need to take the full course of antibiotics. · Gargle with warm salt water once an hour to help reduce swelling and relieve discomfort. Use 1 teaspoon of salt mixed in 1 cup of warm water. · Take an over-the-counter pain medicine, such as acetaminophen (Tylenol), ibuprofen (Advil, Motrin), or naproxen (Aleve). Read and follow all instructions on the label. · Be careful when taking over-the-counter cold or flu medicines and Tylenol at the same time. Many of these medicines have acetaminophen, which is Tylenol. Read the labels to make sure that you are not taking more than the recommended dose. Too much acetaminophen (Tylenol) can be harmful. · Drink plenty of fluids. Fluids may help soothe an irritated throat. Hot fluids, such as tea or soup, may help decrease throat pain. · Use over-the-counter throat lozenges to soothe pain. Regular cough drops or hard candy may also help. These should not be given to young children because of the risk of choking. · Do not smoke or allow others to smoke around you. If you need help quitting, talk to your doctor about stop-smoking programs and medicines. These can increase your chances of quitting for good. · Use a vaporizer or humidifier to add moisture to your bedroom. Follow the directions for cleaning the machine. When should you call for help? Call your doctor now or seek immediate medical care if:    · You have new or worse trouble swallowing.     · Your sore throat gets much worse on one side.    Watch closely for changes in your health, and be sure to contact your doctor if you do not get better as expected. Where can you learn more? Go to https://chpepiceweb.Jia.com. org and sign in to your Ticket ABC account. Enter S750 in the [facility] box to learn more about \"Sore Throat: Care Instructions. \"     If you do not have an account, please click on the \"Sign Up Now\" link. Current as of: September 8, 2021               Content Version: 13.1  © 5629-4795 Mumart. Care instructions adapted under license by [facility] ([facility]). If you have questions about a medical condition or this instruction, always ask your healthcare professional. [place] [address] any warranty or liability for your use of this information. 1. We will call with results of strep   2. We will call with results of throat culture   3. If patient is not improving or developing any new/worsening symptoms then return to clinic as needed. Patient is to follow up with PCP as needed. Symptomatic Treatments for Sore Throat   1. Sipping cold or warm beverages   2. Eating cold or frozen desserts (eg, ice cream, popsicles)  3. Sucking on ice  4. Sucking on hard candy - For children 5 years and older and adolescents, suggest sucking on hard candy rather than medicated throat lozenges (eg, cough drops, troches, or pastilles) or medicated sprays. Hard candy and lozenges should not be used in children  4 years and younger of age because they are a choking hazard. 5. Gargling with warm salt water - For children 6 years and older of age and adolescents, suggest gargling with warm salt water. Most recipes call for ¼ to ½ teaspoon of salt per 8 ounces (approximately 240 mL) of warm water. Children <6 years generally cannot gargle properly.   6. Tylenol or Ibuprofen for pain

## 2022-01-14 NOTE — PROGRESS NOTES
[facility]  [address] [address] [place] [address]  Dept: [phone]  Dept Fax: [phone]: [phone]    Wendy Land is a 42 y.o. female who presents today for her medical conditions/complaintsas noted below. Wendy Land is c/o of Pharyngitis        HPI:     Pharyngitis  This is a new problem. The problem occurs daily. The problem has been unchanged. Associated symptoms include congestion, headaches and a sore throat. Pertinent negatives include no fever. The symptoms are aggravated by swallowing. Works in healthcare  Past Medical History:   Diagnosis Date    Essential hypertension 12/21/2017    Gastroesophageal reflux disease without esophagitis 8/23/2017    Generalized anxiety disorder      No past surgical history on file. Family History   Problem Relation Age of Onset    Colon Cancer Father 48       Social History     Tobacco Use    Smoking status: Former Smoker    Smokeless tobacco: Never Used   Substance Use Topics    Alcohol use: Yes      Current Outpatient Medications   Medication Sig Dispense Refill    SPRINTEC 28 0.25-35 MG-MCG per tablet TAKE 1 TABLET DAILY 84 tablet 3    fluticasone (FLONASE) 50 MCG/ACT nasal spray 2 sprays by Each Nostril route daily (Patient not taking: Reported on 1/14/2022) 1 Bottle 0     No current facility-administered medications for this visit.      No Known Allergies    Health Maintenance   Topic Date Due    DTaP/Tdap/Td vaccine (1 - Tdap) Never done    COVID-19 Vaccine (3 - Booster for Pfizer series) 07/07/2021    Flu vaccine (1) 09/01/2021    Depression Screen  06/18/2022    Potassium monitoring  10/20/2022    Creatinine monitoring  10/20/2022    Cervical cancer screen  10/21/2024    Lipid screen  10/20/2026    Hepatitis C screen  Completed    HIV screen  Completed    Hepatitis A vaccine  Aged Out    Hepatitis B vaccine  Aged Out    Hib vaccine  Aged Out    Meningococcal (ACWY) vaccine  Aged Out    Pneumococcal 0-64 years Vaccine  Aged Out       Subjective:     Review of Systems   Constitutional: Negative for fever. HENT: Positive for congestion and sore throat. Neurological: Positive for headaches. All other systems reviewed and are negative.      :Objective      Physical Exam  Vitals and nursing note reviewed. Constitutional:       General: She is not in acute distress. Appearance: Normal appearance. She is well-developed. She is ill-appearing. She is not diaphoretic. HENT:      Head: Normocephalic and atraumatic. Right Ear: Tympanic membrane, ear canal and external ear normal.      Left Ear: Tympanic membrane, ear canal and external ear normal.      Nose: Nose normal.      Mouth/Throat:      Mouth: Mucous membranes are moist.      Pharynx: Oropharynx is clear. Posterior oropharyngeal erythema present. Eyes:      Conjunctiva/sclera: Conjunctivae normal.      Pupils: Pupils are equal, round, and reactive to light. Cardiovascular:      Rate and Rhythm: Normal rate and regular rhythm. Heart sounds: Normal heart sounds. No murmur heard. Pulmonary:      Effort: Pulmonary effort is normal. No respiratory distress. Breath sounds: Normal breath sounds. No wheezing, rhonchi or rales. Musculoskeletal:      Cervical back: Normal range of motion. Skin:     General: Skin is warm and dry. Findings: No rash. Neurological:      Mental Status: She is alert and oriented to person, place, and time. Psychiatric:         Mood and Affect: Mood normal.         Behavior: Behavior normal.       /83   Pulse 93   Temp 98.4 °F (36.9 °C) (Temporal)   Ht 5' 2\" (1.575 m)   Wt 126 lb (57.2 kg)   SpO2 99%   BMI 23.05 kg/m²     :Assessment       Diagnosis Orders   1. Viral URI     2. Pharyngitis, unspecified etiology  Rapid Strep Screen    Culture, Throat       :Plan    1. We will call with results of strep   2.  We will call with results of throat culture   3. If patient is not improving or developing any new/worsening symptoms then return to clinic as needed. Patient is to follow up with PCP as needed. Symptomatic Treatments for Sore Throat   1. Sipping cold or warm beverages   2. Eating cold or frozen desserts (eg, ice cream, popsicles)  3. Sucking on ice  4. Sucking on hard candy - For children 5 years and older and adolescents, suggest sucking on hard candy rather than medicated throat lozenges (eg, cough drops, troches, or pastilles) or medicated sprays. Hard candy and lozenges should not be used in children  4 years and younger of age because they are a choking hazard. 5. Gargling with warm salt water - For children 6 years and older of age and adolescents, suggest gargling with warm salt water. Most recipes call for ¼ to ½ teaspoon of salt per 8 ounces (approximately 240 mL) of warm water. Children <6 years generally cannot gargle properly. 6. Tylenol or Ibuprofen for pain   Orders Placed This Encounter   Procedures    Rapid Strep Screen    Culture, Throat   No results found for this visit on 01/14/22. No follow-ups on file. No orders of the defined types were placed in this encounter. Patient given educational materials- see patient instructions. Discussed use, benefit, and side effects of prescribedmedications. All patient questions answered. Pt voiced understanding. Patient Instructions       Patient Education        Upper Respiratory Infection (Cold): Care Instructions  Your Care Instructions     An upper respiratory infection, or URI, is an infection of the nose, sinuses, or throat. URIs are spread by coughs, sneezes, and direct contact. The common cold is the most frequent kind of URI. The flu and sinus infections are other kinds of URIs. Almost all URIs are caused by viruses. Antibiotics won't cure them. But you can treat most infections with home care.  This may include drinking lots of fluids and taking over-the-counter pain medicine. You will probably feel better in 4 to 10 days. The doctor has checked you carefully, but problems can develop later. If you notice any problems or new symptoms, get medical treatment right away. Follow-up care is a key part of your treatment and safety. Be sure to make and go to all appointments, and call your doctor if you are having problems. It's also a good idea to know your test results and keep a list of the medicines you take. How can you care for yourself at home? · To prevent dehydration, drink plenty of fluids. Choose water and other clear liquids until you feel better. If you have kidney, heart, or liver disease and have to limit fluids, talk with your doctor before you increase the amount of fluids you drink. · Take an over-the-counter pain medicine, such as acetaminophen (Tylenol), ibuprofen (Advil, Motrin), or naproxen (Aleve). Read and follow all instructions on the label. · Before you use cough and cold medicines, check the label. These medicines may not be safe for young children or for people with certain health problems. · Be careful when taking over-the-counter cold or flu medicines and Tylenol at the same time. Many of these medicines have acetaminophen, which is Tylenol. Read the labels to make sure that you are not taking more than the recommended dose. Too much acetaminophen (Tylenol) can be harmful. · Get plenty of rest.  · Do not smoke or allow others to smoke around you. If you need help quitting, talk to your doctor about stop-smoking programs and medicines. These can increase your chances of quitting for good. When should you call for help? Call 911 anytime you think you may need emergency care. For example, call if:    · You have severe trouble breathing.    Call your doctor now or seek immediate medical care if:    · You seem to be getting much sicker.     · You have new or worse trouble breathing.     · You have a new or higher fever.     · You have a new rash. Watch closely for changes in your health, and be sure to contact your doctor if:    · You have a new symptom, such as a sore throat, an earache, or sinus pain.     · You cough more deeply or more often, especially if you notice more mucus or a change in the color of your mucus.     · You do not get better as expected. Where can you learn more? Go to https://YaKlasspepiceweb.Missionly. org and sign in to your TicketBox account. Enter O564 in the Jogli box to learn more about \"Upper Respiratory Infection (Cold): Care Instructions. \"     If you do not have an account, please click on the \"Sign Up Now\" link. Current as of: July 6, 2021               Content Version: 13.1  © 2006-2021 play140. Care instructions adapted under license by [facility] ([facility]). If you have questions about a medical condition or this instruction, always ask your healthcare professional. [place] [address] any warranty or liability for your use of this information. Patient Education        Sore Throat: Care Instructions  Your Care Instructions     Infection by bacteria or a virus causes most sore throats. Cigarette smoke, dry air, air pollution, allergies, and yelling can also cause a sore throat. Sore throats can be painful and annoying. Fortunately, most sore throats go away on their own. If you have a bacterial infection, your doctor may prescribe antibiotics. Follow-up care is a key part of your treatment and safety. Be sure to make and go to all appointments, and call your doctor if you are having problems. It's also a good idea to know your test results and keep a list of the medicines you take. How can you care for yourself at home? · If your doctor prescribed antibiotics, take them as directed. Do not stop taking them just because you feel better. You need to take the full course of antibiotics.   · Gargle with warm salt water once an hour to help reduce swelling and relieve discomfort. Use 1 teaspoon of salt mixed in 1 cup of warm water. · Take an over-the-counter pain medicine, such as acetaminophen (Tylenol), ibuprofen (Advil, Motrin), or naproxen (Aleve). Read and follow all instructions on the label. · Be careful when taking over-the-counter cold or flu medicines and Tylenol at the same time. Many of these medicines have acetaminophen, which is Tylenol. Read the labels to make sure that you are not taking more than the recommended dose. Too much acetaminophen (Tylenol) can be harmful. · Drink plenty of fluids. Fluids may help soothe an irritated throat. Hot fluids, such as tea or soup, may help decrease throat pain. · Use over-the-counter throat lozenges to soothe pain. Regular cough drops or hard candy may also help. These should not be given to young children because of the risk of choking. · Do not smoke or allow others to smoke around you. If you need help quitting, talk to your doctor about stop-smoking programs and medicines. These can increase your chances of quitting for good. · Use a vaporizer or humidifier to add moisture to your bedroom. Follow the directions for cleaning the machine. When should you call for help? Call your doctor now or seek immediate medical care if:    · You have new or worse trouble swallowing.     · Your sore throat gets much worse on one side. Watch closely for changes in your health, and be sure to contact your doctor if you do not get better as expected. Where can you learn more? Go to https://Associated Material ProcessingmeghanAkumina.Fluidigm. org and sign in to your Futurederm account. Enter L545 in the Webalo box to learn more about \"Sore Throat: Care Instructions. \"     If you do not have an account, please click on the \"Sign Up Now\" link. Current as of: September 8, 2021               Content Version: 13.1  © 2719-6589 Healthwise, Incorporated. Care instructions adapted under license by [facility] ([facility]).  If you have

## 2022-01-15 LAB — S PYO AG THROAT QL: NEGATIVE

## 2022-01-17 LAB
S PYO THROAT QL CULT: NORMAL
THROAT CULTURE: NORMAL

## 2022-02-18 ENCOUNTER — TELEPHONE (OUTPATIENT)
Dept: INTERNAL MEDICINE | Age: 42
End: 2022-02-18

## 2022-02-18 NOTE — TELEPHONE ENCOUNTER
S/w pt, she requests a call back today to get squeezed in with Dr. Fabienne Bledsoe the week of February 28th; she is full so I cannot schedule her. I did offer an appointment with Dr. Solange Razo today. Pt understands Dr. Fabienne Bledsoe will be out of the office next week and is upset that she cannot see her doctor.

## 2022-02-18 NOTE — TELEPHONE ENCOUNTER
S/W Patient. Her blood pressure today was 128/86. She feels like it is way too high for her normal. She is stressing out with work, she feels like with her job and bp fluctuating it is triggering anxiety and bad headaches. She wanted to see Dr. Mary Romeo but with her being on vacation next week she did agree to see Dedrick Murphy today. She will be discussing her b/p (that she feels is elevated), anxiety and headaches. She agreed to a 4:15 spot and did say she may try to come earlier. I let her know just because she may come earlier does not mean she will be seen earlier.  She advised understanding., HANNAH

## 2022-02-18 NOTE — TELEPHONE ENCOUNTER
Patient called and spoke with Conception Battles, she scheduled them with Dr. Jasmine Rather 2/18 Dr. Jasmine Rather reviewed records and wants patient to see Dr. Maco Jaramillo there are no openings she refused to see Giancarlo Freeman.  Sent to [facility] nurses to address

## 2022-02-18 NOTE — TELEPHONE ENCOUNTER
----- Message from Maria Clancy sent at 2/18/2022  9:34 AM CST -----  Subject: Appointment Request    Reason for Call: Routine Existing Condition Follow Up    QUESTIONS  Type of Appointment? Established Patient  Reason for appointment request? Available appointments did not meet   patient need  Additional Information for Provider? patient is requesting a call back to   set up a appointment for symptoms of bp levels issues and states she is   experiening headaches and thinks it may be due to bp levels the first   appointment for dr. Anne-Marie Conte is 3/1/2022 and pt would like to be seen sooner   due to work schedule.   ---------------------------------------------------------------------------  --------------  Tarena INFO  What is the best way for the office to contact you? OK to leave message on   voicemail  Preferred Call Back Phone Number? [phone]  ---------------------------------------------------------------------------  --------------  SCRIPT ANSWERS  Relationship to Patient? Self  Is this follow up request related to routine Diabetes Management? No  Have you been diagnosed with, awaiting test results for, or told that you   are suspected of having COVID-19 (Coronavirus)? (If patient has tested   negative or was tested as a requirement for work, school, or travel and   not based on symptoms, answer no)? No  Within the past 10 days have you developed any of the following symptoms   (answer no if symptoms have been present longer than 10 days or began   more than 10 days ago)? Fever or Chills, Cough, Shortness of breath or   difficulty breathing, Loss of taste or smell, Sore throat, Nasal   congestion, Sneezing or runny nose, Fatigue or generalized body aches   (answer no if pain is specific to a body part e.g. back pain), Diarrhea,   Headache? No  Have you had close contact with someone with COVID-19 in the last 7 days?    No  (Service Expert  click yes below to proceed with [employer] As Usual Scheduling)?  Yes

## 2022-02-18 NOTE — TELEPHONE ENCOUNTER
Patient called and spoke with Jerome Sin, she scheduled them with Dr. Tres Solorzano 2/18 Dr. Tres Solorzano reviewed records and wants patient to see Dr. Mary Romeo there are no openings she refused to see Sergio Mejia. Can you take a look at the schedule and see when you might be able to work her in.

## 2022-02-26 ENCOUNTER — OFFICE VISIT (OUTPATIENT)
Age: 42
End: 2022-02-26
Payer: COMMERCIAL

## 2022-02-26 VITALS
HEIGHT: 62 IN | TEMPERATURE: 97.7 F | OXYGEN SATURATION: 99 % | BODY MASS INDEX: 23.19 KG/M2 | DIASTOLIC BLOOD PRESSURE: 72 MMHG | SYSTOLIC BLOOD PRESSURE: 104 MMHG | WEIGHT: 126 LBS | HEART RATE: 101 BPM

## 2022-02-26 DIAGNOSIS — H10.32 ACUTE BACTERIAL CONJUNCTIVITIS OF LEFT EYE: Primary | ICD-10-CM

## 2022-02-26 PROCEDURE — 99213 OFFICE O/P EST LOW 20 MIN: CPT | Performed by: NURSE PRACTITIONER

## 2022-02-26 RX ORDER — OFLOXACIN 3 MG/ML
1 SOLUTION/ DROPS OPHTHALMIC 4 TIMES DAILY
Qty: 5 ML | Refills: 0 | Status: SHIPPED | OUTPATIENT
Start: 2022-02-26 | End: 2022-03-05

## 2022-02-26 ASSESSMENT — ENCOUNTER SYMPTOMS: EYE REDNESS: 1

## 2022-02-26 NOTE — PATIENT INSTRUCTIONS
Patient Education        Pinkeye: Care Instructions  Your Care Instructions     Pinkeye is redness and swelling of the eye surface and the conjunctiva (the lining of the eyelid and the covering of the white part of the eye). Pinkeye is also called conjunctivitis. Pinkeye is often caused by infection with bacteria or a virus. Dry air, allergies, smoke, and chemicals are other common causes. Pinkeye often clears on its own in 7 to 10 days. Antibiotics only help if the pinkeye is caused by bacteria. Pinkeye caused by infection spreads easily. If an allergy or chemical is causing pinkeye, it will not go away unless you can avoid whatever is causing it. Follow-up care is a key part of your treatment and safety. Be sure to make and go to all appointments, and call your doctor if you are having problems. It's also a good idea to know your test results and keep a list of the medicines you take. How can you care for yourself at home? · Wash your hands often. Always wash them before and after you treat pinkeye or touch your eyes or face. · Use moist cotton or a clean, wet cloth to remove crust. Wipe from the inside corner of the eye to the outside. Use a clean part of the cloth for each wipe. · Put cold or warm wet cloths on your eye a few times a day if the eye hurts. · Do not wear contact lenses or eye makeup until the pinkeye is gone. Throw away any eye makeup you were using when you got pinkeye. Clean your contacts and storage case. If you wear disposable contacts, use a new pair when your eye has cleared and it is safe to wear contacts again. · If the doctor gave you antibiotic ointment or eyedrops, use them as directed. Use the medicine for as long as instructed, even if your eye starts looking better soon. Keep the bottle tip clean, and do not let it touch the eye area. · To put in eyedrops or ointment:  ? Tilt your head back, and pull your lower eyelid down with one finger. ?  Drop or squirt the medicine inside the lower lid. ? Close your eye for 30 to 60 seconds to let the drops or ointment move around. ? Do not touch the ointment or dropper tip to your eyelashes or any other surface. · Do not share towels, pillows, or washcloths while you have pinkeye. When should you call for help? Call your doctor now or seek immediate medical care if:    · You have pain in your eye, not just irritation on the surface.     · You have a change in vision or loss of vision.     · You have an increase in discharge from the eye.     · Your eye has not started to improve or begins to get worse within 48 hours after you start using antibiotics.     · Pinkeye lasts longer than 7 days. Watch closely for changes in your health, and be sure to contact your doctor if you have any problems. Where can you learn more? Go to [email or web address] [email or web address] and sign in to your SafetySkills account. Enter Y392 in the Agari box to learn more about \"Pinkeye: Care Instructions. \"     If you do not have an account, please click on the \"Sign Up Now\" link. Current as of: July 1, 2021               Content Version: 13.1  © 2006-2021 Healthwise, Incorporated. Care instructions adapted under license by [facility] ([facility]). If you have questions about a medical condition or this instruction, always ask your healthcare professional. [place] [address] any warranty or liability for your use of this information.

## 2022-02-26 NOTE — PROGRESS NOTES
HIV screen  Completed    Hepatitis A vaccine  Aged Out    Hepatitis B vaccine  Aged Out    Hib vaccine  Aged Out    Meningococcal (ACWY) vaccine  Aged Out    Pneumococcal 0-64 years Vaccine  Aged Out       Subjective:     Review of Systems   Eyes: Positive for redness. All other systems reviewed and are negative.      :Objective      Physical Exam  Vitals and nursing note reviewed. Constitutional:       General: She is not in acute distress. Appearance: Normal appearance. She is well-developed. She is not ill-appearing or diaphoretic. HENT:      Head: Normocephalic. Eyes:      General:         Right eye: No discharge. Left eye: No discharge. Conjunctiva/sclera:      Left eye: Left conjunctiva is injected (very mild). Pupils: Pupils are equal, round, and reactive to light. Pulmonary:      Effort: Pulmonary effort is normal. No respiratory distress. Musculoskeletal:      Cervical back: Normal range of motion. Skin:     General: Skin is warm and dry. Findings: No rash. Neurological:      Mental Status: She is alert and oriented to person, place, and time. Psychiatric:         Mood and Affect: Mood normal.         Behavior: Behavior normal.       /72   Pulse 101   Temp 97.7 °F (36.5 °C)   Ht 5' 2\" (1.575 m)   Wt 126 lb (57.2 kg)   LMP 01/12/2022   SpO2 99%   BMI 23.05 kg/m²     :Assessment       Diagnosis Orders   1. Acute bacterial conjunctivitis of left eye         :Plan    No orders of the defined types were placed in this encounter. No follow-ups on file. Orders Placed This Encounter   Medications    ofloxacin (OCUFLOX) 0.3 % solution     Sig: Place 1 drop into the left eye 4 times daily for 7 days     Dispense:  5 mL     Refill:  0       Patient given educational materials- see patient instructions. Discussed use, benefit, and side effects of prescribedmedications. All patient questions answered. Pt voiced understanding.      Patient Instructions       Patient Education        Pinkeye: Care Instructions  Your Care Instructions     Pinkeye is redness and swelling of the eye surface and the conjunctiva (the lining of the eyelid and the covering of the white part of the eye). Pinkeye is also called conjunctivitis. Pinkeye is often caused by infection with bacteria or a virus. Dry air, allergies, smoke, and chemicals are other common causes. Pinkeye often clears on its own in 7 to 10 days. Antibiotics only help if the pinkeye is caused by bacteria. Pinkeye caused by infection spreads easily. If an allergy or chemical is causing pinkeye, it will not go away unless you can avoid whatever is causing it. Follow-up care is a key part of your treatment and safety. Be sure to make and go to all appointments, and call your doctor if you are having problems. It's also a good idea to know your test results and keep a list of the medicines you take. How can you care for yourself at home? · Wash your hands often. Always wash them before and after you treat pinkeye or touch your eyes or face. · Use moist cotton or a clean, wet cloth to remove crust. Wipe from the inside corner of the eye to the outside. Use a clean part of the cloth for each wipe. · Put cold or warm wet cloths on your eye a few times a day if the eye hurts. · Do not wear contact lenses or eye makeup until the pinkeye is gone. Throw away any eye makeup you were using when you got pinkeye. Clean your contacts and storage case. If you wear disposable contacts, use a new pair when your eye has cleared and it is safe to wear contacts again. · If the doctor gave you antibiotic ointment or eyedrops, use them as directed. Use the medicine for as long as instructed, even if your eye starts looking better soon. Keep the bottle tip clean, and do not let it touch the eye area. · To put in eyedrops or ointment:  ? Tilt your head back, and pull your lower eyelid down with one finger. ?  Drop or squirt the medicine inside the lower lid. ? Close your eye for 30 to 60 seconds to let the drops or ointment move around. ? Do not touch the ointment or dropper tip to your eyelashes or any other surface. · Do not share towels, pillows, or washcloths while you have pinkeye. When should you call for help? Call your doctor now or seek immediate medical care if:    · You have pain in your eye, not just irritation on the surface.     · You have a change in vision or loss of vision.     · You have an increase in discharge from the eye.     · Your eye has not started to improve or begins to get worse within 48 hours after you start using antibiotics.     · Pinkeye lasts longer than 7 days. Watch closely for changes in your health, and be sure to contact your doctor if you have any problems. Where can you learn more? Go to https://[email or web address].[email or web address] org and sign in to your [employer] account. Enter Y392 in the TLBX.me box to learn more about \"Pinkeye: Care Instructions. \"     If you do not have an account, please click on the \"Sign Up Now\" link. Current as of: July 1, 2021               Content Version: 13.1  © 2006-2021 Healthwise, Incorporated. Care instructions adapted under license by [facility] ([place] [facility]). If you have questions about a medical condition or this instruction, always ask your healthcare professional. [place] [address] any warranty or liability for your use of this information.                Electronically signed by KATE Smith on 2/26/2022 at 5:30 PM

## 2022-02-28 ENCOUNTER — OFFICE VISIT (OUTPATIENT)
Dept: INTERNAL MEDICINE | Age: 42
End: 2022-02-28
Payer: COMMERCIAL

## 2022-02-28 VITALS
SYSTOLIC BLOOD PRESSURE: 128 MMHG | RESPIRATION RATE: 18 BRPM | HEART RATE: 76 BPM | HEIGHT: 62 IN | WEIGHT: 126 LBS | OXYGEN SATURATION: 98 % | BODY MASS INDEX: 23.19 KG/M2 | DIASTOLIC BLOOD PRESSURE: 80 MMHG

## 2022-02-28 DIAGNOSIS — F41.8 SITUATIONAL ANXIETY: ICD-10-CM

## 2022-02-28 DIAGNOSIS — I10 ESSENTIAL HYPERTENSION: Primary | ICD-10-CM

## 2022-02-28 DIAGNOSIS — F41.1 GENERALIZED ANXIETY DISORDER: ICD-10-CM

## 2022-02-28 PROCEDURE — 99213 OFFICE O/P EST LOW 20 MIN: CPT | Performed by: INTERNAL MEDICINE

## 2022-02-28 RX ORDER — BUSPIRONE HYDROCHLORIDE 10 MG/1
10 TABLET ORAL 2 TIMES DAILY PRN
Qty: 60 TABLET | Refills: 2 | Status: SHIPPED | OUTPATIENT
Start: 2022-02-28 | End: 2022-07-08

## 2022-02-28 ASSESSMENT — ENCOUNTER SYMPTOMS
COUGH: 0
SORE THROAT: 0
CONSTIPATION: 0
WHEEZING: 0
CHEST TIGHTNESS: 0
ABDOMINAL PAIN: 0

## 2022-02-28 ASSESSMENT — PATIENT HEALTH QUESTIONNAIRE - PHQ9
SUM OF ALL RESPONSES TO PHQ9 QUESTIONS 1 & 2: 0
2. FEELING DOWN, DEPRESSED OR HOPELESS: 0
1. LITTLE INTEREST OR PLEASURE IN DOING THINGS: 0
SUM OF ALL RESPONSES TO PHQ QUESTIONS 1-9: 0

## 2022-02-28 NOTE — PROGRESS NOTES
Chief Complaint   Patient presents with    Anxiety     When pt gets stressed her BP shoots up and then cant get it down, then takes a few days to get it back down, and is giving her a headache     History of presenting illness:  Delia Kidd is a36 y.o. female who presents today for follow up on her chronic medical conditions as noted below. Patient Active Problem List    Diagnosis Date Noted    Acute non-recurrent pansinusitis 03/04/2021    Essential hypertension 12/21/2017    Generalized anxiety disorder 08/23/2017    Gastroesophageal reflux disease without esophagitis 08/23/2017    Fibrocystic breast 03/15/2013    Cervical dysplasia, mild 02/27/2012     Past Medical History:   Diagnosis Date    Essential hypertension 12/21/2017    Gastroesophageal reflux disease without esophagitis 8/23/2017    Generalized anxiety disorder       No past surgical history on file. Current Outpatient Medications   Medication Sig Dispense Refill    busPIRone (BUSPAR) 10 MG tablet Take 1 tablet by mouth 2 times daily as needed (anxiety) 60 tablet 2    ofloxacin (OCUFLOX) 0.3 % solution Place 1 drop into the left eye 4 times daily for 7 days 5 mL 0    SPRINTEC 28 0.25-35 MG-MCG per tablet TAKE 1 TABLET DAILY 84 tablet 3    fluticasone (FLONASE) 50 MCG/ACT nasal spray 2 sprays by Each Nostril route daily (Patient not taking: Reported on 1/14/2022) 1 Bottle 0     No current facility-administered medications for this visit. No Known Allergies  Social History     Tobacco Use    Smoking status: Former Smoker    Smokeless tobacco: Never Used   Substance Use Topics    Alcohol use: Yes      Family History   Problem Relation Age of Onset    Colon Cancer Father 48       Review of Systems   Constitutional: Negative for chills, fatigue and fever. HENT: Negative for congestion, ear pain, nosebleeds, postnasal drip and sore throat. Respiratory: Negative for cough, chest tightness and wheezing.     Cardiovascular: Negative for chest pain, palpitations and leg swelling. Gastrointestinal: Negative for abdominal pain and constipation. Genitourinary: Negative for dysuria and urgency. Musculoskeletal: Negative. Negative for arthralgias. Skin: Negative for rash. Neurological: Negative for dizziness and headaches. Psychiatric/Behavioral: The patient is nervous/anxious. Vitals:    02/28/22 1341   BP: 128/80   Site: Left Upper Arm   Position: Sitting   Cuff Size: Large Adult   Pulse: 76   Resp: 18   SpO2: 98%   Weight: 126 lb (57.2 kg)   Height: 5' 2\" (1.575 m)     Body mass index is 23.05 kg/m². Physical Exam  Constitutional:       Appearance: She is well-developed. HENT:      Right Ear: External ear normal.      Left Ear: External ear normal.      Mouth/Throat:      Pharynx: No oropharyngeal exudate. Eyes:      Conjunctiva/sclera: Conjunctivae normal.      Pupils: Pupils are equal, round, and reactive to light. Neck:      Thyroid: No thyromegaly. Vascular: No JVD. Cardiovascular:      Rate and Rhythm: Normal rate. Heart sounds: Normal heart sounds. No murmur heard. Pulmonary:      Effort: No respiratory distress. Breath sounds: Normal breath sounds. No wheezing or rales. Chest:      Chest wall: No tenderness. Abdominal:      General: Bowel sounds are normal.      Palpations: Abdomen is soft. Musculoskeletal:         General: Normal range of motion. Cervical back: Neck supple. Lymphadenopathy:      Cervical: No cervical adenopathy. Skin:     General: Skin is warm. Findings: No rash. Neurological:      Mental Status: She is oriented to person, place, and time.          Lab Review   Office Visit on 01/14/2022   Component Date Value    Rapid Strep A Screen 01/14/2022 Negative     Throat Culture 01/14/2022 Normal respiratory yaz, No Beta Strep isolated     Strep A Culture 01/14/2022 Normal respiratory yaz, No Beta Strep isolated ASSESSMENT/PLAN:    Generalized anxiety disorder  Situational anxiety  Stress at work/home  Long discussion with patient  Discussed with patient regarding natural ways to increase serotonin/dopamine levels and help stress    *Exercise regularly/ join yoga practice  *Meditation  *Appropriate sleep 7-9 hours  *Healthy high-fiber diet to feel healthy gut bacteria which shows in research to play important role in serotonin levels through the gut   brain axis  *Light therapy, spend time outdoors / be exposed to bright light     Rx BuSpar 10 mg every 12 hours as needed for anxiety  If sx not improving and resolving , if sx continue or re-occur pt has been instructed to call us and / or return here for follow- up evaluation       Essential hypertension  Currently blood pressure is in good range  No Rx is needed          No orders of the defined types were placed in this encounter. New Prescriptions    BUSPIRONE (BUSPAR) 10 MG TABLET    Take 1 tablet by mouth 2 times daily as needed (anxiety)         No follow-ups on file. There are no Patient Instructions on file for this visit. EMR Dragon/transcription disclaimer:Significant part of this  encounter note is electronic transcription/translationof spoken language to printed text. The electronic translation of spoken language may be erroneous, or at times, nonsensical words or phrases may be inadvertently transcribed.  Although I have reviewed the note for sucherrors, some may still exist.

## 2022-03-09 ENCOUNTER — OFFICE VISIT (OUTPATIENT)
Age: 42
End: 2022-03-09
Payer: COMMERCIAL

## 2022-03-09 VITALS
OXYGEN SATURATION: 100 % | WEIGHT: 126 LBS | SYSTOLIC BLOOD PRESSURE: 122 MMHG | RESPIRATION RATE: 18 BRPM | HEIGHT: 62 IN | DIASTOLIC BLOOD PRESSURE: 64 MMHG | BODY MASS INDEX: 23.19 KG/M2 | TEMPERATURE: 98.7 F | HEART RATE: 86 BPM

## 2022-03-09 DIAGNOSIS — J02.9 VIRAL PHARYNGITIS: Primary | ICD-10-CM

## 2022-03-09 DIAGNOSIS — J06.9 VIRAL URI WITH COUGH: ICD-10-CM

## 2022-03-09 DIAGNOSIS — J02.9 SORE THROAT: ICD-10-CM

## 2022-03-09 LAB — S PYO AG THROAT QL: NORMAL

## 2022-03-09 PROCEDURE — 99213 OFFICE O/P EST LOW 20 MIN: CPT | Performed by: NURSE PRACTITIONER

## 2022-03-09 PROCEDURE — 87880 STREP A ASSAY W/OPTIC: CPT | Performed by: NURSE PRACTITIONER

## 2022-03-09 ASSESSMENT — ENCOUNTER SYMPTOMS
SORE THROAT: 1
COUGH: 1

## 2022-03-09 NOTE — LETTER
[facility]  [id number]  Phone: [phone]  Fax: KATE Rodriguez        March 9, 2022     Patient: David Hess   YOB: 1980   Date of Visit: 3/9/2022       To Whom it May Concern:    David Hess was seen in my clinic on 3/9/2022. She may return to work on 3/10/2022. If you have any questions or concerns, please don't hesitate to call.     Sincerely,         KATE Preston

## 2022-03-09 NOTE — PATIENT INSTRUCTIONS
Patient Education        Sore Throat: Care Instructions  Your Care Instructions     Infection by bacteria or a virus causes most sore throats. Cigarette smoke, dry air, air pollution, allergies, and yelling can also cause a sore throat. Sore throats can be painful and annoying. Fortunately, most sore throats go away on their own. If you have a bacterial infection, your doctor may prescribe antibiotics. Follow-up care is a key part of your treatment and safety. Be sure to make and go to all appointments, and call your doctor if you are having problems. It's also a good idea to know your test results and keep a list of the medicines you take. How can you care for yourself at home? · If your doctor prescribed antibiotics, take them as directed. Do not stop taking them just because you feel better. You need to take the full course of antibiotics. · Gargle with warm salt water once an hour to help reduce swelling and relieve discomfort. Use 1 teaspoon of salt mixed in 1 cup of warm water. · Take an over-the-counter pain medicine, such as acetaminophen (Tylenol), ibuprofen (Advil, Motrin), or naproxen (Aleve). Read and follow all instructions on the label. · Be careful when taking over-the-counter cold or flu medicines and Tylenol at the same time. Many of these medicines have acetaminophen, which is Tylenol. Read the labels to make sure that you are not taking more than the recommended dose. Too much acetaminophen (Tylenol) can be harmful. · Drink plenty of fluids. Fluids may help soothe an irritated throat. Hot fluids, such as tea or soup, may help decrease throat pain. · Use over-the-counter throat lozenges to soothe pain. Regular cough drops or hard candy may also help. These should not be given to young children because of the risk of choking. · Do not smoke or allow others to smoke around you. If you need help quitting, talk to your doctor about stop-smoking programs and medicines.  These can increase your chances of quitting for good. · Use a vaporizer or humidifier to add moisture to your bedroom. Follow the directions for cleaning the machine. When should you call for help? Call your doctor now or seek immediate medical care if:    · You have new or worse trouble swallowing.     · Your sore throat gets much worse on one side. Watch closely for changes in your health, and be sure to contact your doctor if you do not get better as expected. Where can you learn more? Go to https://Bac[email or web address].[email or web address] org and sign in to your LanternCRM account. Enter C261 in the One Hour Translation box to learn more about \"Sore Throat: Care Instructions. \"     If you do not have an account, please click on the \"Sign Up Now\" link. Current as of: September 8, 2021               Content Version: 13.1  © 0788-2318 [employer]. Care instructions adapted under license by [facility] ([facility]). If you have questions about a medical condition or this instruction, always ask your healthcare professional. [place] [address] any warranty or liability for your use of this information. Patient Education        Viral Respiratory Infection: Care Instructions  Your Care Instructions     Viruses are very small organisms. They grow in number after they enter your body. There are many types that cause different illnesses, such as colds and the mumps. The symptoms of a viral respiratory infection often start quickly. They include a fever, sore throat, and runny nose. You may also just not feel well. Or you may not want to eat much. Most viral respiratory infections are not serious. They usually get better with time and self-care. Antibiotics are not used to treat a viral infection. That's because antibiotics will not help cure a viral illness. In some cases, antiviral medicine can help your body fight a serious viral infection. Follow-up care is a key part of your treatment and safety.  Be sure to make and go to all appointments, and call your doctor if you are having problems. It's also a good idea to know your test results and keep a list of the medicines you take. How can you care for yourself at home? · Rest as much as possible until you feel better. · Be safe with medicines. Take your medicine exactly as prescribed. Call your doctor if you think you are having a problem with your medicine. You will get more details on the specific medicine your doctor prescribes. · Take an over-the-counter pain medicine, such as acetaminophen (Tylenol), ibuprofen (Advil, Motrin), or naproxen (Aleve), as needed for pain and fever. Read and follow all instructions on the label. Do not give aspirin to anyone younger than 20. It has been linked to Reye syndrome, a serious illness. · Drink plenty of fluids. Hot fluids, such as tea or soup, may help relieve congestion in your nose and throat. If you have kidney, heart, or liver disease and have to limit fluids, talk with your doctor before you increase the amount of fluids you drink. · Try to clear mucus from your lungs by breathing deeply and coughing. · Gargle with warm salt water once an hour. This can help reduce swelling and throat pain. Use 1 teaspoon of salt mixed in 1 cup of warm water. · Do not smoke or allow others to smoke around you. If you need help quitting, talk to your doctor about stop-smoking programs and medicines. These can increase your chances of quitting for good. To avoid spreading the virus  · Cough or sneeze into a tissue. Then throw the tissue away. · If you don't have a tissue, use your hand to cover your cough or sneeze. Then clean your hand. You can also cough into your sleeve. · Wash your hands often. Use soap and warm water. Wash for 15 to 20 seconds each time. · If you don't have soap and water near you, you can clean your hands with alcohol wipes or gel. When should you call for help?    Call your doctor now or seek immediate medical care if:    · You have a new or higher fever.     · Your fever lasts more than 48 hours.     · You have trouble breathing.     · You have a fever with a stiff neck or a severe headache.     · You are sensitive to light.     · You feel very sleepy or confused. Watch closely for changes in your health, and be sure to contact your doctor if:    · You do not get better as expected. Where can you learn more? Go to [email or web address]://Global [email or web address] [email or web address]. org and sign in to your Reniac account. Enter I551 in the POET Technologies box to learn more about \"Viral Respiratory Infection: Care Instructions. \"     If you do not have an account, please click on the \"Sign Up Now\" link. Current as of: July 6, 2021               Content Version: 13.1  © 3686-8694 Healthwise, DriverTech. Care instructions adapted under license by [facility] ([facility]). If you have questions about a medical condition or this instruction, always ask your healthcare professional. [place] [address] any warranty or liability for your use of this information. 1. Rest and increase fluid intake. 2. Symptomatic and supportive care  3. Monitor for fever and treat as needed with tylenol or ibuprofen  4. If patient is not improving or developing any new/worsening symptoms then return to clinic as needed or follow up with PCP    Symptomatic Treatments for Sore Throat   1. Sipping cold or warm beverages   2. Eating cold or frozen desserts (eg, ice cream, popsicles)  3. Sucking on ice  4. Sucking on hard candy - For children 5 years and older and adolescents, suggest sucking on hard candy rather than medicated throat lozenges (eg, cough drops, troches, or pastilles) or medicated sprays. Hard candy and lozenges should not be used in children  4 years and younger of age because they are a choking hazard. 5. Gargling with warm salt water - For children 6 years and older of age and adolescents, suggest gargling with warm salt water.  Most recipes call for ¼ to ½ teaspoon of salt per 8 ounces (approximately 240 mL) of warm water. Children <6 years generally cannot gargle properly.   6. Tylenol or Ibuprofen for pain

## 2022-03-09 NOTE — PROGRESS NOTES
[address] [id number]  Dept: [phone]  Dept Fax: [phone]  Loc: [phone]    Lis Sam is a 42 y.o. female who presents today for her medical conditions/complaintsas noted below. Lis Sam is c/o of Cough, Pharyngitis, and Fatigue        HPI:     Pharyngitis  This is a new problem. The problem occurs constantly. The problem has been unchanged. Associated symptoms include coughing, fatigue and a sore throat. The symptoms are aggravated by swallowing. Treatments tried: cough drop. The treatment provided no relief. Fatigue  Associated symptoms include coughing, fatigue and a sore throat. Past Medical History:   Diagnosis Date    Essential hypertension 12/21/2017    Gastroesophageal reflux disease without esophagitis 8/23/2017    Generalized anxiety disorder      No past surgical history on file. Family History   Problem Relation Age of Onset    Colon Cancer Father 48       Social History     Tobacco Use    Smoking status: Former Smoker    Smokeless tobacco: Never Used   Substance Use Topics    Alcohol use: Yes      Current Outpatient Medications   Medication Sig Dispense Refill    busPIRone (BUSPAR) 10 MG tablet Take 1 tablet by mouth 2 times daily as needed (anxiety) 60 tablet 2    SPRINTEC 28 0.25-35 MG-MCG per tablet TAKE 1 TABLET DAILY 84 tablet 3    fluticasone (FLONASE) 50 MCG/ACT nasal spray 2 sprays by Each Nostril route daily (Patient not taking: Reported on 1/14/2022) 1 Bottle 0     No current facility-administered medications for this visit.      No Known Allergies    Health Maintenance   Topic Date Due    COVID-19 Vaccine (3 - Booster for Pfizer series) 06/07/2021    Flu vaccine (1) 09/01/2021    DTaP/Tdap/Td vaccine (1 - Tdap) 03/21/2022 (Originally 7/17/1998)    Potassium monitoring  10/20/2022    Creatinine monitoring  10/20/2022    Depression Screen  02/28/2023    Cervical cancer screen  10/21/2024    Lipid screen  10/20/2026    Hepatitis C screen  Completed    HIV screen  Completed    Hepatitis A vaccine  Aged Out    Hepatitis B vaccine  Aged Out    Hib vaccine  Aged Out    Meningococcal (ACWY) vaccine  Aged Out    Pneumococcal 0-64 years Vaccine  Aged Out       Subjective:     Review of Systems   Constitutional: Positive for fatigue. HENT: Positive for sore throat. Respiratory: Positive for cough. All other systems reviewed and are negative.      :Objective      Physical Exam  Vitals and nursing note reviewed. Constitutional:       General: She is not in acute distress. Appearance: Normal appearance. She is well-developed. She is ill-appearing. She is not diaphoretic. HENT:      Head: Normocephalic and atraumatic. Right Ear: Tympanic membrane, ear canal and external ear normal.      Left Ear: Tympanic membrane, ear canal and external ear normal.      Nose: Congestion present. Mouth/Throat:      Mouth: Mucous membranes are moist.      Pharynx: Oropharynx is clear. Posterior oropharyngeal erythema present. Eyes:      Conjunctiva/sclera: Conjunctivae normal.      Pupils: Pupils are equal, round, and reactive to light. Cardiovascular:      Rate and Rhythm: Normal rate and regular rhythm. Heart sounds: Normal heart sounds. No murmur heard. Pulmonary:      Effort: Pulmonary effort is normal. No respiratory distress. Breath sounds: Normal breath sounds. No wheezing. Musculoskeletal:      Cervical back: Normal range of motion. Skin:     General: Skin is warm and dry. Findings: No rash. Neurological:      Mental Status: She is alert and oriented to person, place, and time.    Psychiatric:         Mood and Affect: Mood normal.         Behavior: Behavior normal.       /64   Pulse 86   Temp 98.7 °F (37.1 °C) (Temporal)   Resp 18   Ht 5' 2\" (1.575 m)   Wt 126 lb (57.2 kg)   SpO2 100%   BMI 23.05 kg/m² :Assessment       Diagnosis Orders   1. Viral pharyngitis     2. Sore throat  POCT rapid strep A   3. Viral URI with cough         :Plan    1. Rest and increase fluid intake. 2. Symptomatic and supportive care  3. Monitor for fever and treat as needed with tylenol or ibuprofen  4. If patient is not improving or developing any new/worsening symptoms then return to clinic as needed or follow up with PCP    Symptomatic Treatments for Sore Throat   1. Sipping cold or warm beverages   2. Eating cold or frozen desserts (eg, ice cream, popsicles)  3. Sucking on ice  4. Sucking on hard candy - For children 5 years and older and adolescents, suggest sucking on hard candy rather than medicated throat lozenges (eg, cough drops, troches, or pastilles) or medicated sprays. Hard candy and lozenges should not be used in children  4 years and younger of age because they are a choking hazard. 5. Gargling with warm salt water - For children 6 years and older of age and adolescents, suggest gargling with warm salt water. Most recipes call for ¼ to ½ teaspoon of salt per 8 ounces (approximately 240 mL) of warm water. Children <6 years generally cannot gargle properly. 6. Tylenol or Ibuprofen for pain   Orders Placed This Encounter   Procedures    POCT rapid strep A     Results for orders placed or performed in visit on 03/09/22   POCT rapid strep A   Result Value Ref Range    Strep A Ag None Detected None Detected         Return if symptoms worsen or fail to improve. No orders of the defined types were placed in this encounter. Patient given educational materials- see patient instructions. Discussed use, benefit, and side effects of prescribedmedications. All patient questions answered. Pt voiced understanding. Patient Instructions       Patient Education        Sore Throat: Care Instructions  Your Care Instructions     Infection by bacteria or a virus causes most sore throats.  Cigarette smoke, dry air, air pollution, allergies, and yelling can also cause a sore throat. Sore throats can be painful and annoying. Fortunately, most sore throats go away on their own. If you have a bacterial infection, your doctor may prescribe antibiotics. Follow-up care is a key part of your treatment and safety. Be sure to make and go to all appointments, and call your doctor if you are having problems. It's also a good idea to know your test results and keep a list of the medicines you take. How can you care for yourself at home? · If your doctor prescribed antibiotics, take them as directed. Do not stop taking them just because you feel better. You need to take the full course of antibiotics. · Gargle with warm salt water once an hour to help reduce swelling and relieve discomfort. Use 1 teaspoon of salt mixed in 1 cup of warm water. · Take an over-the-counter pain medicine, such as acetaminophen (Tylenol), ibuprofen (Advil, Motrin), or naproxen (Aleve). Read and follow all instructions on the label. · Be careful when taking over-the-counter cold or flu medicines and Tylenol at the same time. Many of these medicines have acetaminophen, which is Tylenol. Read the labels to make sure that you are not taking more than the recommended dose. Too much acetaminophen (Tylenol) can be harmful. · Drink plenty of fluids. Fluids may help soothe an irritated throat. Hot fluids, such as tea or soup, may help decrease throat pain. · Use over-the-counter throat lozenges to soothe pain. Regular cough drops or hard candy may also help. These should not be given to young children because of the risk of choking. · Do not smoke or allow others to smoke around you. If you need help quitting, talk to your doctor about stop-smoking programs and medicines. These can increase your chances of quitting for good. · Use a vaporizer or humidifier to add moisture to your bedroom. Follow the directions for cleaning the machine.   When should you call for help?   Call your doctor now or seek immediate medical care if:    · You have new or worse trouble swallowing.     · Your sore throat gets much worse on one side. Watch closely for changes in your health, and be sure to contact your doctor if you do not get better as expected. Where can you learn more? Go to https://chpepiceweb.[employer]. org and sign in to your [employer] account. Enter L424 in the [employer] box to learn more about \"Sore Throat: Care Instructions. \"     If you do not have an account, please click on the \"Sign Up Now\" link. Current as of: September 8, 2021               Content Version: 13.1  © 2006-2021 [employer]. Care instructions adapted under license by [facility] ([facility]). If you have questions about a medical condition or this instruction, always ask your healthcare professional. [place] [address] any warranty or liability for your use of this information. Patient Education        Viral Respiratory Infection: Care Instructions  Your Care Instructions     Viruses are very small organisms. They grow in number after they enter your body. There are many types that cause different illnesses, such as colds and the mumps. The symptoms of a viral respiratory infection often start quickly. They include a fever, sore throat, and runny nose. You may also just not feel well. Or you may not want to eat much. Most viral respiratory infections are not serious. They usually get better with time and self-care. Antibiotics are not used to treat a viral infection. That's because antibiotics will not help cure a viral illness. In some cases, antiviral medicine can help your body fight a serious viral infection. Follow-up care is a key part of your treatment and safety. Be sure to make and go to all appointments, and call your doctor if you are having problems.  It's also a good idea to know your test results and keep a list of the medicines you take.  How can you care for yourself at home? · Rest as much as possible until you feel better. · Be safe with medicines. Take your medicine exactly as prescribed. Call your doctor if you think you are having a problem with your medicine. You will get more details on the specific medicine your doctor prescribes. · Take an over-the-counter pain medicine, such as acetaminophen (Tylenol), ibuprofen (Advil, Motrin), or naproxen (Aleve), as needed for pain and fever. Read and follow all instructions on the label. Do not give aspirin to anyone younger than 20. It has been linked to Reye syndrome, a serious illness. · Drink plenty of fluids. Hot fluids, such as tea or soup, may help relieve congestion in your nose and throat. If you have kidney, heart, or liver disease and have to limit fluids, talk with your doctor before you increase the amount of fluids you drink. · Try to clear mucus from your lungs by breathing deeply and coughing. · Gargle with warm salt water once an hour. This can help reduce swelling and throat pain. Use 1 teaspoon of salt mixed in 1 cup of warm water. · Do not smoke or allow others to smoke around you. If you need help quitting, talk to your doctor about stop-smoking programs and medicines. These can increase your chances of quitting for good. To avoid spreading the virus  · Cough or sneeze into a tissue. Then throw the tissue away. · If you don't have a tissue, use your hand to cover your cough or sneeze. Then clean your hand. You can also cough into your sleeve. · Wash your hands often. Use soap and warm water. Wash for 15 to 20 seconds each time. · If you don't have soap and water near you, you can clean your hands with alcohol wipes or gel. When should you call for help?    Call your doctor now or seek immediate medical care if:    · You have a new or higher fever.     · Your fever lasts more than 48 hours.     · You have trouble breathing.     · You have a fever with a stiff neck or a severe headache.     · You are sensitive to light.     · You feel very sleepy or confused. Watch closely for changes in your health, and be sure to contact your doctor if:    · You do not get better as expected. Where can you learn more? Go to https://yusuf.[employer]. org and sign in to your [employer] account. Enter R979 in the [employer] box to learn more about \"Viral Respiratory Infection: Care Instructions. \"     If you do not have an account, please click on the \"Sign Up Now\" link. Current as of: July 6, 2021               Content Version: 13.1  © 2006-2021 [employer]. Care instructions adapted under license by [facility] ([facility]). If you have questions about a medical condition or this instruction, always ask your healthcare professional. [address] any warranty or liability for your use of this information. 1. Rest and increase fluid intake. 2. Symptomatic and supportive care  3. Monitor for fever and treat as needed with tylenol or ibuprofen  4. If patient is not improving or developing any new/worsening symptoms then return to clinic as needed or follow up with PCP    Symptomatic Treatments for Sore Throat   1. Sipping cold or warm beverages   2. Eating cold or frozen desserts (eg, ice cream, popsicles)  3. Sucking on ice  4. Sucking on hard candy - For children 5 years and older and adolescents, suggest sucking on hard candy rather than medicated throat lozenges (eg, cough drops, troches, or pastilles) or medicated sprays. Hard candy and lozenges should not be used in children  4 years and younger of age because they are a choking hazard. 5. Gargling with warm salt water - For children 6 years and older of age and adolescents, suggest gargling with warm salt water. Most recipes call for ¼ to ½ teaspoon of salt per 8 ounces (approximately 240 mL) of warm water. Children <6 years generally cannot gargle properly.   6. Tylenol or Ibuprofen for pain         Electronically signed by KTAE Smith on 3/9/2022 at 5:52 PM

## 2022-03-21 ENCOUNTER — OFFICE VISIT (OUTPATIENT)
Dept: INTERNAL MEDICINE | Age: 42
End: 2022-03-21
Payer: COMMERCIAL

## 2022-03-21 VITALS
WEIGHT: 124 LBS | RESPIRATION RATE: 18 BRPM | SYSTOLIC BLOOD PRESSURE: 132 MMHG | DIASTOLIC BLOOD PRESSURE: 86 MMHG | BODY MASS INDEX: 22.68 KG/M2 | OXYGEN SATURATION: 98 % | HEART RATE: 90 BPM

## 2022-03-21 DIAGNOSIS — G44.89 HEADACHE SYNDROME: ICD-10-CM

## 2022-03-21 DIAGNOSIS — F41.8 SITUATIONAL ANXIETY: ICD-10-CM

## 2022-03-21 DIAGNOSIS — I10 ESSENTIAL HYPERTENSION: Primary | ICD-10-CM

## 2022-03-21 DIAGNOSIS — R03.0 ELEVATED BLOOD PRESSURE READING: ICD-10-CM

## 2022-03-21 PROCEDURE — 99213 OFFICE O/P EST LOW 20 MIN: CPT | Performed by: INTERNAL MEDICINE

## 2022-03-21 RX ORDER — METOPROLOL SUCCINATE 25 MG/1
25 TABLET, EXTENDED RELEASE ORAL DAILY
COMMUNITY
End: 2022-03-28

## 2022-03-21 ASSESSMENT — PATIENT HEALTH QUESTIONNAIRE - PHQ9
SUM OF ALL RESPONSES TO PHQ QUESTIONS 1-9: 0
SUM OF ALL RESPONSES TO PHQ QUESTIONS 1-9: 0
2. FEELING DOWN, DEPRESSED OR HOPELESS: 0
SUM OF ALL RESPONSES TO PHQ QUESTIONS 1-9: 0
SUM OF ALL RESPONSES TO PHQ9 QUESTIONS 1 & 2: 0
1. LITTLE INTEREST OR PLEASURE IN DOING THINGS: 0
SUM OF ALL RESPONSES TO PHQ QUESTIONS 1-9: 0

## 2022-03-21 ASSESSMENT — ENCOUNTER SYMPTOMS
WHEEZING: 0
SORE THROAT: 0
ABDOMINAL PAIN: 0
COUGH: 0
CONSTIPATION: 0
CHEST TIGHTNESS: 0

## 2022-03-21 NOTE — PROGRESS NOTES
Chief Complaint   Patient presents with    Hypertension     BP has been running 144/94 for several days, pt has started taking 1/2 tablet of the Metoprolol 25  XR and seems to be handlling it well this time. Started taking this since Thursday (03/17/2022)     History of presenting illness:  Yuniel Aguilera is a36 y.o. female who presents today for follow up on her chronic medical conditions as noted below. Patient Active Problem List    Diagnosis Date Noted    Acute non-recurrent pansinusitis 03/04/2021    Essential hypertension 12/21/2017    Generalized anxiety disorder 08/23/2017    Gastroesophageal reflux disease without esophagitis 08/23/2017    Fibrocystic breast 03/15/2013    Cervical dysplasia, mild 02/27/2012     Past Medical History:   Diagnosis Date    Essential hypertension 12/21/2017    Gastroesophageal reflux disease without esophagitis 8/23/2017    Generalized anxiety disorder       No past surgical history on file. Current Outpatient Medications   Medication Sig Dispense Refill    metoprolol succinate (TOPROL XL) 25 MG extended release tablet Take 12.5 mg by mouth daily      busPIRone (BUSPAR) 10 MG tablet Take 1 tablet by mouth 2 times daily as needed (anxiety) 60 tablet 2    SPRINTEC 28 0.25-35 MG-MCG per tablet TAKE 1 TABLET DAILY 84 tablet 3    fluticasone (FLONASE) 50 MCG/ACT nasal spray 2 sprays by Each Nostril route daily (Patient not taking: Reported on 3/21/2022) 1 Bottle 0     No current facility-administered medications for this visit. No Known Allergies  Social History     Tobacco Use    Smoking status: Former Smoker    Smokeless tobacco: Never Used   Substance Use Topics    Alcohol use: Yes      Family History   Problem Relation Age of Onset    Colon Cancer Father 48       Review of Systems   Constitutional: Positive for fatigue. Negative for chills and fever. HENT: Negative for congestion, ear pain, nosebleeds, postnasal drip and sore throat.     Respiratory: Negative for cough, chest tightness and wheezing. Cardiovascular: Negative for chest pain, palpitations and leg swelling. Gastrointestinal: Negative for abdominal pain and constipation. Genitourinary: Negative for dysuria and urgency. Musculoskeletal: Negative. Negative for arthralgias. Skin: Negative for rash. Neurological: Negative for dizziness and headaches. Psychiatric/Behavioral: Negative. Vitals:    03/21/22 1527   BP: 132/86   Site: Left Upper Arm   Position: Sitting   Cuff Size: Small Adult   Pulse: 90   Resp: 18   SpO2: 98%   Weight: 124 lb (56.2 kg)     Body mass index is 22.68 kg/m². Physical Exam  Constitutional:       Appearance: She is well-developed. HENT:      Right Ear: External ear normal.      Left Ear: External ear normal.      Mouth/Throat:      Pharynx: No oropharyngeal exudate. Eyes:      Conjunctiva/sclera: Conjunctivae normal.      Pupils: Pupils are equal, round, and reactive to light. Neck:      Thyroid: No thyromegaly. Vascular: No JVD. Cardiovascular:      Rate and Rhythm: Normal rate. Heart sounds: Normal heart sounds. No murmur heard. Pulmonary:      Effort: No respiratory distress. Breath sounds: Normal breath sounds. No wheezing or rales. Chest:      Chest wall: No tenderness. Abdominal:      General: Bowel sounds are normal.      Palpations: Abdomen is soft. Musculoskeletal:      Cervical back: Neck supple. Lymphadenopathy:      Cervical: No cervical adenopathy. Skin:     Findings: No rash.          Lab Review   Office Visit on 03/09/2022   Component Date Value    Strep A Ag 03/09/2022 None Detected            ASSESSMENT/PLAN:        Essential hypertension    Elevated blood pressure reading    Headache syndrome  Recommendation today  rx metoprolol ER 12.5 mg po daily  Monitor BP+ HR  Send me readings in 10 days      Generalized anxiety disorder  Situational anxiety  Stress at work/home  Long discussion with patient    ZT buspar PRN  Discussed with patient regarding natural ways to increase serotonin/dopamine levels and help stress     *Exercise regularly/ join yoga practice  *Meditation  *Appropriate sleep 7-9 hours  *Healthy high-fiber diet to feel healthy gut bacteria which shows in research to play important role in serotonin levels through the gut   brain axis  *Light therapy, spend time outdoors / be exposed to bright light        No orders of the defined types were placed in this encounter. New Prescriptions    No medications on file         No follow-ups on file. There are no Patient Instructions on file for this visit. EMR Dragon/transcription disclaimer:Significant part of this  encounter note is electronic transcription/translationof spoken language to printed text. The electronic translation of spoken language may be erroneous, or at times, nonsensical words or phrases may be inadvertently transcribed.  Although I have reviewed the note for sucherrors, some may still exist.

## 2022-03-28 ENCOUNTER — OFFICE VISIT (OUTPATIENT)
Dept: INTERNAL MEDICINE | Age: 42
End: 2022-03-28
Payer: COMMERCIAL

## 2022-03-28 ENCOUNTER — PATIENT MESSAGE (OUTPATIENT)
Dept: INTERNAL MEDICINE | Age: 42
End: 2022-03-28

## 2022-03-28 VITALS
RESPIRATION RATE: 18 BRPM | DIASTOLIC BLOOD PRESSURE: 78 MMHG | SYSTOLIC BLOOD PRESSURE: 128 MMHG | OXYGEN SATURATION: 99 % | WEIGHT: 126 LBS | BODY MASS INDEX: 23.19 KG/M2 | HEIGHT: 62 IN | HEART RATE: 97 BPM

## 2022-03-28 DIAGNOSIS — F41.8 SITUATIONAL ANXIETY: ICD-10-CM

## 2022-03-28 DIAGNOSIS — I10 ESSENTIAL HYPERTENSION: Primary | ICD-10-CM

## 2022-03-28 DIAGNOSIS — R05.9 COUGH: ICD-10-CM

## 2022-03-28 PROCEDURE — 99213 OFFICE O/P EST LOW 20 MIN: CPT | Performed by: INTERNAL MEDICINE

## 2022-03-28 RX ORDER — AMLODIPINE BESYLATE 2.5 MG/1
2.5 TABLET ORAL DAILY
Qty: 30 TABLET | Refills: 0 | Status: SHIPPED | OUTPATIENT
Start: 2022-03-28 | End: 2022-04-19

## 2022-03-28 RX ORDER — PROMETHAZINE HYDROCHLORIDE AND CODEINE PHOSPHATE 6.25; 1 MG/5ML; MG/5ML
5 SYRUP ORAL EVERY 4 HOURS PRN
Qty: 80 ML | Refills: 0 | Status: SHIPPED | OUTPATIENT
Start: 2022-03-28 | End: 2022-04-11

## 2022-03-28 ASSESSMENT — ENCOUNTER SYMPTOMS
COUGH: 1
SORE THROAT: 0
WHEEZING: 0
ABDOMINAL PAIN: 0
CONSTIPATION: 0
CHEST TIGHTNESS: 0

## 2022-03-28 NOTE — TELEPHONE ENCOUNTER
Please talk to me  Metoprolol is not usually associated with coughing at all  Never  had patient with this side effect

## 2022-03-28 NOTE — TELEPHONE ENCOUNTER
From: Mark Vick  To: Dr. Micky Avila: 3/27/2022 11:03 PM CDT  Subject: Metoprolol     I increased my 1/2 tablet to a whole tablet because my BP was still high at 155/ 85 on Tues 3/23. Iv had an onset of constant coughing since Thursday 3/24. I have no other symptoms of a cold. Sadly, I suspect the medication increase is causing my coughing. Its constant at times and a cough suppressant is not stopping it. What do I need to do? I cant continue on like this, the coughing is waking me up at night. My BP does appear to be running much lower, the highest was 124/78    Thank you!    María Gomes

## 2022-03-28 NOTE — PROGRESS NOTES
Chief Complaint   Patient presents with    Cough     Started on 03/23/2022. Pt increased her Metoprolol on 03/22/2022 to 1 whole tablet, states since then she has coughed alot. It wakes her up at night.  Wheezing     History of presenting illness:  Corrie Kincaid is a36 y.o. female who presents today for follow up on her chronic medical conditions as noted below. Patient Active Problem List    Diagnosis Date Noted    Acute non-recurrent pansinusitis 03/04/2021    Essential hypertension 12/21/2017    Generalized anxiety disorder 08/23/2017    Gastroesophageal reflux disease without esophagitis 08/23/2017    Fibrocystic breast 03/15/2013    Cervical dysplasia, mild 02/27/2012     Past Medical History:   Diagnosis Date    Essential hypertension 12/21/2017    Gastroesophageal reflux disease without esophagitis 8/23/2017    Generalized anxiety disorder       No past surgical history on file. Current Outpatient Medications   Medication Sig Dispense Refill    amLODIPine (NORVASC) 2.5 MG tablet Take 1 tablet by mouth daily 30 tablet 0    promethazine-codeine (PHENERGAN WITH CODEINE) 6.25-10 MG/5ML syrup Take 5 mLs by mouth every 4 hours as needed for Cough for up to 14 days. 80 mL 0    busPIRone (BUSPAR) 10 MG tablet Take 1 tablet by mouth 2 times daily as needed (anxiety) 60 tablet 2    SPRINTEC 28 0.25-35 MG-MCG per tablet TAKE 1 TABLET DAILY 84 tablet 3    fluticasone (FLONASE) 50 MCG/ACT nasal spray 2 sprays by Each Nostril route daily (Patient not taking: Reported on 3/21/2022) 1 Bottle 0     No current facility-administered medications for this visit. No Known Allergies  Social History     Tobacco Use    Smoking status: Former Smoker    Smokeless tobacco: Never Used   Substance Use Topics    Alcohol use: Yes      Family History   Problem Relation Age of Onset    Colon Cancer Father 48       Review of Systems   Constitutional: Positive for fatigue. Negative for chills and fever.    HENT: Negative for congestion, ear pain, nosebleeds, postnasal drip and sore throat. Respiratory: Positive for cough. Negative for chest tightness and wheezing. Cardiovascular: Negative for chest pain, palpitations and leg swelling. Gastrointestinal: Negative for abdominal pain and constipation. Genitourinary: Negative for dysuria and urgency. Musculoskeletal: Negative. Negative for arthralgias. Skin: Negative for rash. Neurological: Negative for dizziness and headaches. Psychiatric/Behavioral: Negative. Vitals:    03/28/22 1524   BP: 128/78   Site: Left Upper Arm   Position: Sitting   Cuff Size: Small Adult   Pulse: 97   Resp: 18   SpO2: 99%   Weight: 126 lb (57.2 kg)   Height: 5' 2\" (1.575 m)     Body mass index is 23.05 kg/m². Physical Exam  Constitutional:       Appearance: She is well-developed. HENT:      Right Ear: External ear normal.      Left Ear: External ear normal.      Mouth/Throat:      Pharynx: No oropharyngeal exudate. Eyes:      Conjunctiva/sclera: Conjunctivae normal.      Pupils: Pupils are equal, round, and reactive to light. Neck:      Thyroid: No thyromegaly. Vascular: No JVD. Cardiovascular:      Rate and Rhythm: Normal rate. Heart sounds: Normal heart sounds. No murmur heard. Pulmonary:      Effort: No respiratory distress. Breath sounds: Rales present. No wheezing. Chest:      Chest wall: No tenderness. Abdominal:      General: Bowel sounds are normal.      Palpations: Abdomen is soft. Musculoskeletal:      Cervical back: Neck supple. Lymphadenopathy:      Cervical: No cervical adenopathy. Skin:     Findings: No rash.          Lab Review   Office Visit on 03/09/2022   Component Date Value    Strep A Ag 03/09/2022 None Detected            ASSESSMENT/PLAN:    Essential hypertension    Ever since patient increased her metoprolol dose to control her blood pressure better she has developed coughing  She is not wheezing, and does not have any postnasal drip or any other allergy symptoms  Patient feels that her coughing is caused by metoprolol  It is not one of the usual side effects of coughing especially in people no history of asthma or reactive airways disease  However, since it clearly has started after her dose was increased -we could do trial without metoprolol  Recommendations today  1. Discontinue metoprolol 25  2. Initiate amlodipine 2.5 mg bedtime  In the future may consider using ARB class  Send blood pressure readings to us in about 2 weeks and call us this week on 4/1/2022 for update regarding her condition    Cough  Rx for nighttime cough syrup  -     promethazine-codeine (PHENERGAN WITH CODEINE) 6.25-10 MG/5ML syrup; Take 5 mLs by mouth every 4 hours as needed for Cough for up to 14 days. If sx not improving and resolving , if sx continue or re-occur pt has been instructed to call us and / or return here for follow- up evaluation    Other orders  -     amLODIPine (NORVASC) 2.5 MG tablet; Take 1 tablet by mouth daily              No orders of the defined types were placed in this encounter. New Prescriptions    AMLODIPINE (NORVASC) 2.5 MG TABLET    Take 1 tablet by mouth daily    PROMETHAZINE-CODEINE (PHENERGAN WITH CODEINE) 6.25-10 MG/5ML SYRUP    Take 5 mLs by mouth every 4 hours as needed for Cough for up to 14 days. No follow-ups on file. There are no Patient Instructions on file for this visit. EMR Dragon/transcription disclaimer:Significant part of this  encounter note is electronic transcription/translationof spoken language to printed text. The electronic translation of spoken language may be erroneous, or at times, nonsensical words or phrases may be inadvertently transcribed.  Although I have reviewed the note for sucherrors, some may still exist.

## 2022-04-04 ENCOUNTER — OFFICE VISIT (OUTPATIENT)
Dept: INTERNAL MEDICINE | Age: 42
End: 2022-04-04
Payer: COMMERCIAL

## 2022-04-04 VITALS
HEART RATE: 79 BPM | DIASTOLIC BLOOD PRESSURE: 76 MMHG | OXYGEN SATURATION: 100 % | SYSTOLIC BLOOD PRESSURE: 126 MMHG | TEMPERATURE: 98.9 F

## 2022-04-04 DIAGNOSIS — J01.40 ACUTE NON-RECURRENT PANSINUSITIS: Primary | ICD-10-CM

## 2022-04-04 PROCEDURE — 99213 OFFICE O/P EST LOW 20 MIN: CPT | Performed by: INTERNAL MEDICINE

## 2022-04-04 RX ORDER — AMOXICILLIN AND CLAVULANATE POTASSIUM 875; 125 MG/1; MG/1
1 TABLET, FILM COATED ORAL 2 TIMES DAILY
Qty: 14 TABLET | Refills: 0 | Status: SHIPPED | OUTPATIENT
Start: 2022-04-04 | End: 2022-04-11

## 2022-04-04 RX ORDER — BENZONATATE 200 MG/1
200 CAPSULE ORAL 3 TIMES DAILY PRN
Qty: 30 CAPSULE | Refills: 0 | Status: SHIPPED | OUTPATIENT
Start: 2022-04-04 | End: 2022-04-11

## 2022-04-04 RX ORDER — FLUTICASONE PROPIONATE 50 MCG
2 SPRAY, SUSPENSION (ML) NASAL DAILY
Qty: 16 G | Refills: 0 | Status: SHIPPED | OUTPATIENT
Start: 2022-04-04 | End: 2022-04-20 | Stop reason: CLARIF

## 2022-04-04 ASSESSMENT — ENCOUNTER SYMPTOMS
SHORTNESS OF BREATH: 0
ABDOMINAL DISTENTION: 0
COUGH: 1
BACK PAIN: 0
SINUS PRESSURE: 0
ABDOMINAL PAIN: 0
BLOOD IN STOOL: 0
NAUSEA: 0
SORE THROAT: 1
WHEEZING: 0
VOMITING: 0
EYE DISCHARGE: 0
EYE ITCHING: 0
TROUBLE SWALLOWING: 0

## 2022-04-04 NOTE — PROGRESS NOTES
loss, sinus pressure and trouble swallowing. Eyes: Negative for discharge and itching. Respiratory: Positive for cough. Negative for shortness of breath and wheezing. Cardiovascular: Negative for chest pain, palpitations and leg swelling. Gastrointestinal: Negative for abdominal distention, abdominal pain, blood in stool, nausea and vomiting. Endocrine: Negative for cold intolerance, heat intolerance and polydipsia. Genitourinary: Negative for flank pain, frequency, hematuria and urgency. Musculoskeletal: Negative for arthralgias, back pain and joint swelling. Skin: Negative for rash and wound. Allergic/Immunologic: Negative for environmental allergies and food allergies. Neurological: Negative for dizziness, tremors, syncope, weakness, numbness and headaches. Hematological: Negative for adenopathy. Psychiatric/Behavioral: Negative for agitation and hallucinations. The patient is not nervous/anxious. Objective:      /76   Pulse 79   Temp 98.9 °F (37.2 °C)   SpO2 100%    Physical Exam  Constitutional:       General: She is not in acute distress. Appearance: She is well-developed. She is not diaphoretic. HENT:      Head: Normocephalic and atraumatic. Right Ear: External ear normal.      Left Ear: External ear normal.      Nose: Nose normal.      Mouth/Throat:      Pharynx: Posterior oropharyngeal erythema present. No oropharyngeal exudate. Eyes:      General: No scleral icterus. Right eye: No discharge. Left eye: No discharge. Conjunctiva/sclera: Conjunctivae normal.      Pupils: Pupils are equal, round, and reactive to light. Neck:      Thyroid: No thyromegaly. Vascular: No JVD. Trachea: No tracheal deviation. Cardiovascular:      Rate and Rhythm: Normal rate and regular rhythm. Heart sounds: Normal heart sounds. No murmur heard. No friction rub. No gallop.     Pulmonary:      Effort: Pulmonary effort is normal. No respiratory distress. Breath sounds: Normal breath sounds. No wheezing or rales. Abdominal:      General: Bowel sounds are normal. There is no distension. Palpations: Abdomen is soft. There is no mass. Tenderness: There is no abdominal tenderness. There is no guarding or rebound. Musculoskeletal:         General: No tenderness or deformity. Normal range of motion. Cervical back: Normal range of motion and neck supple. Lymphadenopathy:      Cervical: No cervical adenopathy. Skin:     General: Skin is warm and dry. Coloration: Skin is not pale. Findings: No erythema or rash. Neurological:      Mental Status: She is alert and oriented to person, place, and time. Cranial Nerves: No cranial nerve deficit. Motor: No abnormal muscle tone. Coordination: Coordination normal.      Deep Tendon Reflexes: Reflexes are normal and symmetric. Reflexes normal.   Psychiatric:         Behavior: Behavior normal.         Thought Content: Thought content normal.         Judgment: Judgment normal.          Assessment:      Diagnosis Orders   1. Acute non-recurrent pansinusitis              Plan:      sinusitis with green drainage. I am adding Flonase spray 2 sprays daily. She already has cough medicine so she is going to continue using that. I am adding on Augmentin 875 twice daily for 10 days push fluids and keep her scheduled appointment with Dr. Ashish Morejon. No follow-ups on file. Patient given educational materials- see patient instructions. Discussed use, benefit, and side effects of prescribedmedications. All patient questions answered. Pt voiced understanding. Reviewedhealth maintenance. Instructed to continue current medications, diet and exercise. Patient agreed with treatment plan. **This report has been created usingvoice recognition software. It may contain minor errors which are inherent in voicerecognition technology. **    Electronically signed by Arash Keita Mary Wallace MD on 4/4/2022 at 12:01 PM

## 2022-04-04 NOTE — PROGRESS NOTES
She thought it was metoprolol and thought it was causing the cough. Now, son was dx with bronchitis, so she thinks it could be something infectious.

## 2022-04-19 RX ORDER — AMLODIPINE BESYLATE 2.5 MG/1
TABLET ORAL
Qty: 90 TABLET | Refills: 1 | Status: SHIPPED | OUTPATIENT
Start: 2022-04-19 | End: 2022-04-26 | Stop reason: SDUPTHER

## 2022-04-19 NOTE — TELEPHONE ENCOUNTER
Codey Olvera called requesting a refill of the below medication which has been pended for you:     Requested Prescriptions     Pending Prescriptions Disp Refills    amLODIPine (NORVASC) 2.5 MG tablet [Pharmacy Med Name: AMLODIPINE BESYLATE 2.5 MG TAB] 90 tablet 1     Sig: TAKE 1 TABLET BY MOUTH EVERY DAY       Last Appointment Date: 3/28/2022  Next Appointment Date: Visit date not found    No Known Allergies

## 2022-04-20 ENCOUNTER — OFFICE VISIT (OUTPATIENT)
Dept: INTERNAL MEDICINE | Age: 42
End: 2022-04-20
Payer: COMMERCIAL

## 2022-04-20 VITALS
OXYGEN SATURATION: 100 % | DIASTOLIC BLOOD PRESSURE: 70 MMHG | HEIGHT: 62 IN | WEIGHT: 127 LBS | TEMPERATURE: 97 F | BODY MASS INDEX: 23.37 KG/M2 | SYSTOLIC BLOOD PRESSURE: 118 MMHG | HEART RATE: 80 BPM

## 2022-04-20 DIAGNOSIS — J01.40 ACUTE NON-RECURRENT PANSINUSITIS: ICD-10-CM

## 2022-04-20 PROCEDURE — 99214 OFFICE O/P EST MOD 30 MIN: CPT | Performed by: NURSE PRACTITIONER

## 2022-04-20 PROCEDURE — 96372 THER/PROPH/DIAG INJ SC/IM: CPT | Performed by: NURSE PRACTITIONER

## 2022-04-20 RX ORDER — CEFDINIR 300 MG/1
300 CAPSULE ORAL 2 TIMES DAILY
Qty: 14 CAPSULE | Refills: 0 | Status: SHIPPED | OUTPATIENT
Start: 2022-04-20 | End: 2022-04-27

## 2022-04-20 RX ORDER — DEXAMETHASONE SODIUM PHOSPHATE 4 MG/ML
4 INJECTION, SOLUTION INTRA-ARTICULAR; INTRALESIONAL; INTRAMUSCULAR; INTRAVENOUS; SOFT TISSUE ONCE
Status: COMPLETED | OUTPATIENT
Start: 2022-04-20 | End: 2022-04-20

## 2022-04-20 RX ORDER — METOPROLOL SUCCINATE 25 MG/1
25 TABLET, EXTENDED RELEASE ORAL DAILY
Qty: 30 TABLET | Refills: 1 | Status: SHIPPED | OUTPATIENT
Start: 2022-04-20 | End: 2022-07-08

## 2022-04-20 RX ADMIN — DEXAMETHASONE SODIUM PHOSPHATE 4 MG: 4 INJECTION, SOLUTION INTRA-ARTICULAR; INTRALESIONAL; INTRAMUSCULAR; INTRAVENOUS; SOFT TISSUE at 11:42

## 2022-04-20 ASSESSMENT — ENCOUNTER SYMPTOMS
DIARRHEA: 0
CHOKING: 0
VOMITING: 0
SHORTNESS OF BREATH: 0
EYE ITCHING: 0
TROUBLE SWALLOWING: 0
ABDOMINAL PAIN: 0
EYE DISCHARGE: 0
CONSTIPATION: 0
SINUS PRESSURE: 1
SORE THROAT: 0
STRIDOR: 0
WHEEZING: 0
COUGH: 1
COLOR CHANGE: 0
NAUSEA: 0
SINUS PAIN: 1
ABDOMINAL DISTENTION: 0
BLOOD IN STOOL: 0

## 2022-04-20 NOTE — PROGRESS NOTES
[facility] PHYSICIAN SERVICES  [facility] INTERNAL MEDICINE  [address] [address] [address]  Dept: [phone]  Dept Fax: [phone]: [phone]    Massiel Valencia (:  1980) is a 42 y.o. female,Established patient  with lira, here for evaluation of the following chief complaint(s): Sinus Problem (saw Dr Thyra Nageotte 2022 just finished antibiotic Augmentin ) and Ear Fullness (times  1 week )      Massiel Valencia is a 42 y.o. female who presents today for her medical conditions/complaints as noted below. Massiel Valencia is c/mervat Sinus Problem (saw Dr Thyra Nageotte 2022 just finished antibiotic Augmentin ) and Ear Fullness (times  1 week )        HPI:     Chief Complaint   Patient presents with    Sinus Problem     saw Dr Thyra Nageotte 2022 just finished antibiotic Augmentin     Ear Fullness     times  1 week      HPI   1. Ears are popping;    2. Sinus congestion; With green drainage;    xyzla ; She has been on augmentin ; She has used mony pot ; Past Medical History:   Diagnosis Date    Essential hypertension 2017    Gastroesophageal reflux disease without esophagitis 2017    Generalized anxiety disorder       History reviewed. No pertinent surgical history.     Vitals 2022 2022 3/28/2022 3/21/2022 3/9/2022    SYSTOLIC 142 [phone] 395   DIASTOLIC 70 76 78 86 64 80   Site - - Left Upper Arm Left Upper Arm - Left Upper Arm   Position - - Sitting Sitting - Sitting   Cuff Size - - Small Adult Small Adult - Large Adult   Pulse 80 79 97 90 86 76   Temp 97 98.9 - - 98.7 -   Resp - - 18 18 18 18   SpO2 100 100 99 98 100 98   Weight 127 lb - 126 lb 124 lb 126 lb 126 lb   Height 5' 2\" - 5' 2\" - 5' 2\" 5' 2\"   Body mass index 23.23 kg/m2 - 23.04 kg/m2 - 23.04 kg/m2 23.04 kg/m2   Some recent data might be hidden       Family History   Problem Relation Age of Onset    Colon Cancer Father 48       Social History     Tobacco Use    Smoking status: Former Smoker    Smokeless tobacco: Never Used   Substance Use Topics    Alcohol use: Yes      Current Outpatient Medications   Medication Sig Dispense Refill    cefdinir (OMNICEF) 300 MG capsule Take 1 capsule by mouth 2 times daily for 7 days 14 capsule 0    amLODIPine (NORVASC) 2.5 MG tablet TAKE 1 TABLET BY MOUTH EVERY DAY 90 tablet 1    SPRINTEC 28 0.25-35 MG-MCG per tablet TAKE 1 TABLET DAILY 84 tablet 3    fluticasone (FLONASE) 50 MCG/ACT nasal spray 2 sprays by Each Nostril route daily 1 Bottle 0     No current facility-administered medications for this visit.      No Known Allergies    Health Maintenance   Topic Date Due    DTaP/Tdap/Td vaccine (1 - Tdap) Never done    COVID-19 Vaccine (3 - Booster for Pfizer series) 06/07/2021    Flu vaccine (Season Ended) 03/21/2023 (Originally 9/1/2022)    Potassium  10/20/2022    Creatinine  10/20/2022    Depression Screen  03/21/2023    Cervical cancer screen  10/21/2024    Lipids  10/20/2026    Hepatitis C screen  Completed    HIV screen  Completed    Hepatitis A vaccine  Aged Out    Hepatitis B vaccine  Aged Out    Hib vaccine  Aged Out    Meningococcal (ACWY) vaccine  Aged Out    Pneumococcal 0-64 years Vaccine  Aged Out       Lab Results   Component Value Date    LABA1C 5.3 10/30/2019     No results found for: PSA, PSADIA  TSH   Date Value Ref Range Status   10/20/2021 4.060 0.270 - 4.200 uIU/mL Final   ]  Lab Results   Component Value Date     10/20/2021    K 4.3 10/20/2021     10/20/2021    CO2 28 10/20/2021    BUN 19 10/20/2021    CREATININE 0.7 10/20/2021    GLUCOSE 94 10/20/2021    CALCIUM 9.4 10/20/2021    PROT 6.6 10/20/2021    LABALBU 4.1 10/20/2021    BILITOT 0.4 10/20/2021    ALKPHOS 50 10/20/2021    AST 14 10/20/2021    ALT 16 10/20/2021    LABGLOM >60 10/20/2021    GFRAA >59 10/20/2021     Lab Results   Component Value Date    CHOL 185 10/20/2021    CHOL 173 10/29/2020    CHOL 184 10/30/2019     Lab Results   Component Value Date    TRIG 104 10/20/2021    TRIG 55 10/29/2020    TRIG 92 10/30/2019     Lab Results   Component Value Date    HDL 68 10/20/2021    HDL 71 10/29/2020    HDL 65 10/30/2019     Lab Results   Component Value Date    LDLCALC 96 10/20/2021    LDLCALC 91 10/29/2020    LDLCALC 101 10/30/2019     Lab Results   Component Value Date     10/20/2021    K 4.3 10/20/2021     10/20/2021    CO2 28 10/20/2021    BUN 19 10/20/2021    CREATININE 0.7 10/20/2021    GLUCOSE 94 10/20/2021    CALCIUM 9.4 10/20/2021      Lab Results   Component Value Date    WBC 7.3 10/20/2021    HGB 12.9 10/20/2021    HCT 40.1 10/20/2021    MCV 93.0 10/20/2021     10/20/2021    LYMPHOPCT 39.6 10/20/2021    RBC 4.31 10/20/2021    MCH 29.9 10/20/2021    MCHC 32.2 (L) 10/20/2021    RDW 12.4 10/20/2021     Lab Results   Component Value Date    VITD25 32.5 10/20/2021       Subjective:      Review of Systems   Constitutional: Negative for fatigue, fever and unexpected weight change. HENT: Positive for congestion, ear pain, sinus pressure and sinus pain. Negative for ear discharge, mouth sores, sore throat and trouble swallowing. Eyes: Negative for discharge, itching and visual disturbance. Respiratory: Positive for cough. Negative for choking, shortness of breath, wheezing and stridor. Cardiovascular: Negative for chest pain, palpitations and leg swelling. Gastrointestinal: Negative for abdominal distention, abdominal pain, blood in stool, constipation, diarrhea, nausea and vomiting. Endocrine: Negative for cold intolerance, polydipsia and polyuria. Genitourinary: Negative for difficulty urinating, dysuria, frequency and urgency. Musculoskeletal: Negative for arthralgias and gait problem. Skin: Negative for color change and rash. Allergic/Immunologic: Negative for food allergies and immunocompromised state. Neurological: Negative for dizziness, tremors, syncope, speech difficulty, weakness and headaches. Hematological: Negative for adenopathy. Does not bruise/bleed easily. Psychiatric/Behavioral: Negative for confusion and hallucinations. Objective:     Physical Exam  Constitutional:       General: She is not in acute distress. Appearance: She is well-developed. HENT:      Head: Normocephalic and atraumatic. Eyes:      General: No scleral icterus. Right eye: No discharge. Left eye: No discharge. Pupils: Pupils are equal, round, and reactive to light. Neck:      Thyroid: No thyromegaly. Vascular: No JVD. Cardiovascular:      Rate and Rhythm: Normal rate and regular rhythm. Heart sounds: Normal heart sounds. No murmur heard. Pulmonary:      Effort: Pulmonary effort is normal. No respiratory distress. Breath sounds: Normal breath sounds. No wheezing or rales. Abdominal:      General: Bowel sounds are normal. There is no distension. Palpations: Abdomen is soft. There is no mass. Tenderness: There is no abdominal tenderness. There is no guarding or rebound. Musculoskeletal:         General: No tenderness. Normal range of motion. Cervical back: Normal range of motion and neck supple. Skin:     General: Skin is warm and dry. Findings: No erythema or rash. Neurological:      Mental Status: She is alert and oriented to person, place, and time. Cranial Nerves: No cranial nerve deficit. Coordination: Coordination normal.      Deep Tendon Reflexes: Reflexes are normal and symmetric. Reflexes normal.   Psychiatric:         Mood and Affect: Mood is not depressed. Behavior: Behavior normal.         Thought Content:  Thought content normal.         Judgment: Judgment normal.       /70   Pulse 80   Temp 97 °F (36.1 °C)   Ht 5' 2\" (1.575 m)   Wt 127 lb (57.6 kg)   SpO2 100%   BMI 23.23 kg/m²           Assessment:      Problem List     Acute non-recurrent pansinusitis      Acute sinusitis  Decadron 4 IM  Cefdinir 300 twice  a day for 7 days  get 2 doses in today    Saline nasal spray 4 times a day both nares for 7 days  Steroid spray, rhinocort, nasocort, nasonex, flonase twice daily about 5 minutes after the saline   mucinex 1200 mg twice daily for 7 days with plenty of water  Delsym cough syrup up to 3 times daily as needed for cough   Ricola cough drops, honey lemon in pink bag;  has echanesia to help build your immunity                 Relevant Medications    fluticasone (FLONASE) 50 MCG/ACT nasal spray    cefdinir (OMNICEF) 300 MG capsule          Plan:        Patient given educational materials - see patient instructions. Discussed use, benefit, and side effects of prescribed medications. Allpatient questions answered. Pt voiced understanding. Reviewed health maintenance. Instructed to continue current medications, diet and exercise. Patient agreed with treatment plan. Follow up as directed. MEDICATIONS:  Orders Placed This Encounter   Medications    dexamethasone (DECADRON) injection 4 mg    cefdinir (OMNICEF) 300 MG capsule     Sig: Take 1 capsule by mouth 2 times daily for 7 days     Dispense:  14 capsule     Refill:  0         ORDERS:  No orders of the defined types were placed in this encounter. Follow-up:  No follow-ups on file.     PATIENT INSTRUCTIONS:  Patient Instructions   1.acute sinusitis    Acute sinusitis  Decadron 4 mg   Cefdinir 300 twice  a day for 7 days  get 2 doses in today    Saline nasal spray 4 times a day both nares for 7 days  Steroid spray, rhinocort, nasocort, nasonex, flonase twice daily about 5 minutes after the saline   mucinex 1200 mg twice daily for 7 days with plenty of water  Delsym cough syrup up to 3 times daily as needed for cough   Ricola cough drops, honey lemon in pink bag;  has echanesia to help build your immunity           Electronically signed by KATE Conrad on 4/20/2022 at 11:46 AM    @    Jonny/transcription disclaimer:  Much of this encounter note is electronic transcription/translation of spoken language to printed texts. The electronic translation of spoken language may be erroneous, or at times,nonsensical words or phrases may be inadvertently transcribed.   Although I have reviewed the note for such errors, some may still exist.

## 2022-04-20 NOTE — TELEPHONE ENCOUNTER
Alycia Carrillo called requesting a refill of the below medication which has been pended for you:     Requested Prescriptions     Pending Prescriptions Disp Refills    metoprolol succinate (TOPROL XL) 25 MG extended release tablet 30 tablet 1     Sig: Take 1 tablet by mouth daily       Last Appointment Date: 3/28/2022  Next Appointment Date: 4/20/2022    No Known Allergies

## 2022-04-20 NOTE — TELEPHONE ENCOUNTER
Pt has restarted the metoprolol 25 mg taking a half tablet and she has not had a cough and also the amlodipine 2.5 with taking this she is keeping her pulse rate down and helps with the anxiety.   Please advise if this is ok

## 2022-04-20 NOTE — ASSESSMENT & PLAN NOTE
Acute sinusitis  Decadron 4 IM  Cefdinir 300 twice  a day for 7 days  get 2 doses in today    Saline nasal spray 4 times a day both nares for 7 days  Steroid spray, rhinocort, nasocort, nasonex, flonase twice daily about 5 minutes after the saline   mucinex 1200 mg twice daily for 7 days with plenty of water  Delsym cough syrup up to 3 times daily as needed for cough   Ricola cough drops, honey lemon in pink bag;  has echanesia to help build your immunity

## 2022-04-20 NOTE — PATIENT INSTRUCTIONS
1.acute sinusitis    Acute sinusitis  Decadron 4 mg   Cefdinir 300 twice  a day for 7 days  get 2 doses in today    Saline nasal spray 4 times a day both nares for 7 days  Steroid spray, rhinocort, nasocort, nasonex, flonase twice daily about 5 minutes after the saline   mucinex 1200 mg twice daily for 7 days with plenty of water  Delsym cough syrup up to 3 times daily as needed for cough   Ricola cough drops, honey lemon in pink bag;  has echanesia to help build your immunity

## 2022-04-26 RX ORDER — AMLODIPINE BESYLATE 2.5 MG/1
TABLET ORAL
Qty: 90 TABLET | Refills: 1 | Status: SHIPPED | OUTPATIENT
Start: 2022-04-26 | End: 2022-08-11 | Stop reason: SDUPTHER

## 2022-07-08 RX ORDER — BUSPIRONE HYDROCHLORIDE 10 MG/1
TABLET ORAL
Qty: 60 TABLET | Refills: 3 | Status: SHIPPED | OUTPATIENT
Start: 2022-07-08

## 2022-07-08 RX ORDER — METOPROLOL SUCCINATE 25 MG/1
TABLET, EXTENDED RELEASE ORAL
Qty: 30 TABLET | Refills: 3 | Status: SHIPPED | OUTPATIENT
Start: 2022-07-08

## 2022-07-08 NOTE — TELEPHONE ENCOUNTER
Last Appointment Date: 3/28/2022  Next Appointment Date: 10/24/2022    No Known Allergies    Patient needs refill on   Requested Prescriptions     Pending Prescriptions Disp Refills    busPIRone (BUSPAR) 10 MG tablet [Pharmacy Med Name: busPIRone HCl 10 MG Oral Tablet] 60 tablet 3     Sig: Take 1 tablet by mouth twice daily as needed for anxiety    metoprolol succinate (TOPROL XL) 25 MG extended release tablet [Pharmacy Med Name: Metoprolol Succinate ER 25 MG Oral Tablet Extended Release 24 Hour] 30 tablet 3     Sig: Take 1 tablet by mouth once daily

## 2022-07-22 ENCOUNTER — OFFICE VISIT (OUTPATIENT)
Age: 42
End: 2022-07-22
Payer: COMMERCIAL

## 2022-07-22 VITALS
HEIGHT: 62 IN | WEIGHT: 127.2 LBS | TEMPERATURE: 97.2 F | OXYGEN SATURATION: 99 % | BODY MASS INDEX: 23.41 KG/M2 | DIASTOLIC BLOOD PRESSURE: 78 MMHG | HEART RATE: 88 BPM | SYSTOLIC BLOOD PRESSURE: 122 MMHG

## 2022-07-22 DIAGNOSIS — R09.89 RUNNY NOSE: ICD-10-CM

## 2022-07-22 DIAGNOSIS — H65.93 OTITIS MEDIA WITH EFFUSION, BILATERAL: ICD-10-CM

## 2022-07-22 DIAGNOSIS — J02.9 SORE THROAT: Primary | ICD-10-CM

## 2022-07-22 LAB — S PYO AG THROAT QL: NORMAL

## 2022-07-22 PROCEDURE — 87880 STREP A ASSAY W/OPTIC: CPT | Performed by: NURSE PRACTITIONER

## 2022-07-22 PROCEDURE — 99212 OFFICE O/P EST SF 10 MIN: CPT | Performed by: NURSE PRACTITIONER

## 2022-07-22 ASSESSMENT — ENCOUNTER SYMPTOMS
SORE THROAT: 1
RHINORRHEA: 1

## 2022-07-22 NOTE — PROGRESS NOTES
Noam Patiño (:  1980) is a 36 y.o. female,Established patient, here for evaluation of the following chief complaint(s):  Pharyngitis    Patient presents today complaining of sore throat, runny nose, and bilateral ear pain for the past 3 days. She states her son has been sick, and now she has symptoms. Patient denies fever, GI symptoms. Inform patient that her rapid strep test in office was negative. Recommended patient to have a COVID test.  Patient declined COVID testing in office, and states she will take an at home COVID test.  Recommend patient to start Zyrtec or Claritin, Flonase, and may try Sudafed to help dry out the sinuses and stop the postnasal drainage. Patient verbalized understanding. ASSESSMENT/PLAN:  1. Sore throat  -     POCT rapid strep A  2. Runny nose  3. Otitis media with effusion, bilateral     No orders of the defined types were placed in this encounter. Return if symptoms worsen or fail to improve. Subjective   SUBJECTIVE/OBJECTIVE:  Pharyngitis  Associated symptoms include a sore throat. Review of Systems   HENT:  Positive for ear pain, rhinorrhea and sore throat. Objective   Physical Exam  Vitals reviewed. HENT:      Right Ear: Ear canal and external ear normal. No drainage, swelling or tenderness. A middle ear effusion is present. Tympanic membrane is not perforated, erythematous, retracted or bulging. Left Ear: Ear canal and external ear normal. No drainage, swelling or tenderness. A middle ear effusion is present. Tympanic membrane is not perforated, erythematous, retracted or bulging. Nose:      Right Sinus: No maxillary sinus tenderness or frontal sinus tenderness. Left Sinus: No maxillary sinus tenderness or frontal sinus tenderness. Mouth/Throat:      Lips: Pink. Mouth: Mucous membranes are moist.      Pharynx: Posterior oropharyngeal erythema (postnasal drainage) present. No oropharyngeal exudate. Cardiovascular:      Rate and Rhythm: Normal rate and regular rhythm. Heart sounds: Normal heart sounds. Pulmonary:      Effort: Pulmonary effort is normal.      Breath sounds: Normal breath sounds. Lymphadenopathy:      Head:      Right side of head: No submandibular or tonsillar adenopathy. Left side of head: No submandibular or tonsillar adenopathy. Cervical:      Right cervical: No superficial cervical adenopathy. Left cervical: No superficial cervical adenopathy. Skin:     General: Skin is warm and dry. Neurological:      Mental Status: She is alert. An electronic signature was used to authenticate this note. --KATE Galicia - CNP     EMR Dragon/translation disclaimer: Much of this encounter note is an electronic transcription/translation of spoken language to printed text. The electronic translation of spoken language may be erroneous, or at times, nonsensical words or phrases may be inadvertently transcribed.   Although I have reviewed the note for such errors, some may still exist.

## 2022-07-28 NOTE — PROCEDURE: MEDICATION COUNSELING
[facility] [id number]  Dept: [phone]      Physical Therapy Daily Note     Referring MD: JOAQUIN Murphy  Diagnosis:    Diagnosis Orders   1. Chronic pain of left knee        2. Difficulty in walking        3. Stiffness of left knee        4. Impaired functional mobility, balance, gait, and endurance        5. Weakness of left lower extremity                  Surgery: N/A    Therapy precautions:None  Co-morbidities affecting plan of care: Hx of left ankle dysfunction and knee injury  Total Timed Procedure Codes: 40 min, Total Time: 55 min    Chief complaints/history of injury: Pt presents to PT today with father present. C/o left knee pain for several months to > year. Unable to ID date of original injury but reports stopping quickly, while playing [employer], when she felt an immediate pop in left knee with sharp pain. Denies bruising but immediate pain the following day which lasted x several weeks. Pt does have a hx of left LE issues involving left ankle and lower leg. ?? Ankle fx with a subsequent dx of chronic regional pain syndrome. Referred to pain management, PT and OT for lower leg and ankle. Hit in knee with ball with pain and swelling. Pt and family felt that was different from CRPS but never treated for that. It is unclear exactly what, if any formal therapy was done for left knee. Pt reports another injury several weeks ago with flare up of left knee pain. Currently limiting nearly all activities    SUBJECTIVE     Pt reports pain decreased compared to previous visit but remains at moderate/severe levels. Spoke w/father and both feel pt is progressing despite pain levels. Overall slow but noticeable improvements in tolerance to ADLs and walking. Pt self limits, when needed, but usually able to participate in family events and daily activities.         OBJECTIVE    Findings/observations:  Girth (joint line) right = 34.6 cm, left =33.8 cm  AROM: left knee ext = 0 deg (arrival)  Observation/palpation: bilat genu valgus; presents to PT w/ mild antalgic pattern; avoids TKE left in standing and supine; palpable crepitus and pop with active and passive knee flex to/from ext  Special Tests: Anterior drawer, Posterior Drawer, Lachman's and Concha all neg left LE; Patellar compression + left LE  Strength/MMT (0-5 Scale):    Right Left Comment   Knee Flexion WFL 4  Left limited by pain and muscle weakness   Knee Extension WFL 4     Quad set [facility] 4+     Hip Flexion WFL 4+     Hip Extension [facility] 4     Hip Abduction WFL 4     Hip ER [facility] 4     Hip IR [facility] WFL         Treatment provided today consisted of: Therapeutic exercise (25857) x 30 min to address ROM/strength deficits of the LLE. Kinesiology tape for improved patella tracking - 2 \"I\" strips w/50% tension pulling lateral to medial at mid to infrapatella. Quad set 2 sec 2 x 10 w/ small towel roll under L knee, slight improvement in knee pain  SLR 2 x 5  Clamshell 2 x 5   S/L hip abd 2x 5  Prone SLR 2 x 5  Seated Hamstring stretch 20\" x 3  TVA bracing 5 sec x 20  Standing heel raises 2x5  Orthodox pews x 20  Anterior- posterior weight shifts on balance board x 20 followed by 1 min hold in center  Standing L hamstring curls 2x5  Updated HEP    Patient Education hurt vs harm w/strengthening; pain and roll of joint stability. Manual therapy (25757) x 10 min utilizing techniques to improve joint and/or soft tissue mobility, ROM, and function as well as helping to decrease pain/spasms and swelling. Palpation and assessment of soft tissue, muscles, and landmarks   Soft tissue mobilizaiton to left quads, hamstrings and gastroc    Vasopneumatic Compression (77452) with cold x 15 minutes: to left knee in order to reduce inflammation and swelling/joint effusion which will help improve ROM and manage pain.  Left LE elevated    ASSESSMENT     Pt  able to participate in all activities with min modifications for pain. TTP lateral > medial hamstrings and abductors. PLAN FOR NEXT VISIT     Assess response to progression of standing exercises. Include in HEP if no flare up. PLAN OF CARE     Effective Dates: 6/28/2022 TO 9/29/2022 (90 days). GOALS     Short term goals to be met by 7/26/2022 (4 weeks):  Pt will demonstrate good recall of HEP requiring minimal verbal cuing for proper form and technique. - MET  Increase knee AROM of involved LE to extension = 0 degrees, in order to walk and perform transfers without compensation. - MET  Pt will increase strength of left LE to >/= 4/5 for improved tolerance to walking, squatting and stairs. - MET  Pt will report pain decreased to 0-6/10 with daily activities indicating soft tissue healing. - Not met due to recent flare up    Long term goals to be met by 8/23/2022 (8 weeks):   Pt will independently ascend and descend steps with reciprocal pattern demonstrating good control and balance using rails only for balance. Pt will report that knee allows patient to fall asleep w/o difficulty and does not awaken patient > 1 during the night. Pt will tolerate gait x community distances, on even and uneven surfaces, without compensation or need for rest or compensation due to left knee pain or fatigue. Pt will demonstrate bilateral squat > 70% without deviation due to left knee pain or stiffness. Pt will demonstrate single leg stance 10 sec or greater on pliable surfaces for safety in preparation of return to running. Long term goals to be met by 9/20/2022 (12 weeks):   1. Pt will report return to previous level of function without c/o pain. 2. Pt will demonstrate good/normal eccentric control with plyometrics indicating safe return to sport. 3. Pt will resume sport and fitness activities including running, swimming and volleyball with min/no compensations for left LE dysfunction.   4. Improve LEFS to no greater than 35% functional restrictions with ADLs, work and athletic activities. 5. Discharged from [place] [id number]  Access Code: [email or web address]  URL: [email or web address]  Date: 07/19/2022  Prepared by: Wai Juan    Exercises  Supine Quad Set - 2 x daily - 1-3 sets - 10 reps - 2 sec hold  Prone Quadriceps Set - 2 x daily - 1-3 sets - 10 reps - 2 sec hold  Active Straight Leg Raise with Quad Set - 2 x daily - 1-3 sets - 10 reps - 2 sec hold  Sidelying Hip Abduction - 2 x daily - 1-3 sets - 10 reps - 2 sec hold  Prone Hip Extension - 2 x daily - 1-3 sets - 10 reps - 2 sec hold  Supine Hamstring Stretch with Strap - 2 x daily - 2-3 sets - 20-30 sec hold  Supine Transversus Abdominis Bracing - Hands on Stomach - 2 x daily - 5-10 reps - 5-10 sec hold  Supine Sciatic Nerve Glide - 2 x daily - 10-20 reps  Standing Anti-Rotation Press with Anchored Resistance - 1-2 x daily - 10-20 reps  Standing Shoulder Extension with Resistance - 1-2 x daily - 10-20 reps  Standing Heel Raise with Support - 2 x daily - 2-3 sets - 5 reps  Standing Knee Flexion AROM with Chair Support - 2 x daily - 2-3 sets - 5 reps - 5 hold Infliximab Counseling:  I discussed with the patient the risks of infliximab including but not limited to myelosuppression, immunosuppression, autoimmune hepatitis, demyelinating diseases, lymphoma, and serious infections.  The patient understands that monitoring is required including a PPD at baseline and must alert us or the primary physician if symptoms of infection or other concerning signs are noted.

## 2022-08-01 RX ORDER — NORGESTIMATE AND ETHINYL ESTRADIOL 0.25-0.035
KIT ORAL
Qty: 84 TABLET | Refills: 1 | Status: SHIPPED | OUTPATIENT
Start: 2022-08-01

## 2022-08-11 RX ORDER — AMLODIPINE BESYLATE 2.5 MG/1
TABLET ORAL
Qty: 90 TABLET | Refills: 1 | Status: SHIPPED | OUTPATIENT
Start: 2022-08-11

## 2022-08-11 NOTE — TELEPHONE ENCOUNTER
Magui Claudio called requesting a refill of the below medication which has been pended for you:     Requested Prescriptions     Pending Prescriptions Disp Refills    amLODIPine (NORVASC) 2.5 MG tablet 90 tablet 1     Sig: TAKE 1 TABLET BY MOUTH EVERY DAY       Last Appointment Date: 3/28/2022  Next Appointment Date: 10/24/2022    No Known Allergies

## 2022-08-25 ENCOUNTER — OFFICE VISIT (OUTPATIENT)
Dept: INTERNAL MEDICINE | Age: 42
End: 2022-08-25
Payer: COMMERCIAL

## 2022-08-25 VITALS
DIASTOLIC BLOOD PRESSURE: 70 MMHG | HEIGHT: 62 IN | SYSTOLIC BLOOD PRESSURE: 118 MMHG | WEIGHT: 124 LBS | BODY MASS INDEX: 22.82 KG/M2 | RESPIRATION RATE: 18 BRPM | OXYGEN SATURATION: 98 % | HEART RATE: 86 BPM

## 2022-08-25 DIAGNOSIS — R52 BODY ACHES: ICD-10-CM

## 2022-08-25 DIAGNOSIS — R05.9 COUGH: ICD-10-CM

## 2022-08-25 DIAGNOSIS — J21.9 ACUTE BRONCHITIS AND BRONCHIOLITIS: Primary | ICD-10-CM

## 2022-08-25 DIAGNOSIS — J20.9 ACUTE BRONCHITIS AND BRONCHIOLITIS: Primary | ICD-10-CM

## 2022-08-25 DIAGNOSIS — J02.9 SORE THROAT: ICD-10-CM

## 2022-08-25 DIAGNOSIS — R09.81 NASAL CONGESTION: ICD-10-CM

## 2022-08-25 PROCEDURE — 99213 OFFICE O/P EST LOW 20 MIN: CPT | Performed by: INTERNAL MEDICINE

## 2022-08-25 RX ORDER — AZITHROMYCIN 250 MG/1
250 TABLET, FILM COATED ORAL SEE ADMIN INSTRUCTIONS
Qty: 6 TABLET | Refills: 0 | Status: SHIPPED | OUTPATIENT
Start: 2022-08-25 | End: 2022-08-30

## 2022-08-25 RX ORDER — PROMETHAZINE HYDROCHLORIDE AND CODEINE PHOSPHATE 6.25; 1 MG/5ML; MG/5ML
5 SYRUP ORAL EVERY 4 HOURS PRN
Qty: 100 ML | Refills: 0 | Status: SHIPPED | OUTPATIENT
Start: 2022-08-25 | End: 2022-09-08

## 2022-08-25 RX ORDER — PREDNISONE 10 MG/1
10 TABLET ORAL 2 TIMES DAILY
Qty: 6 TABLET | Refills: 0 | Status: SHIPPED | OUTPATIENT
Start: 2022-08-25 | End: 2022-08-28

## 2022-08-25 ASSESSMENT — ENCOUNTER SYMPTOMS
CHEST TIGHTNESS: 0
ABDOMINAL PAIN: 0
SORE THROAT: 1
WHEEZING: 0
COUGH: 1
SINUS PAIN: 1
CONSTIPATION: 0
SINUS PRESSURE: 1

## 2022-08-25 NOTE — PROGRESS NOTES
Chief Complaint   Patient presents with    Pharyngitis     Started on Tuesday, no fever, a little bit of a dry cough, had some body aches and chills yesterday, took a rapid test at home last night, it was negative     Nasal Congestion     History of presenting illness:  Fely Willingham is a35 y.o. female who presents today for follow up on her chronic medical conditions as noted below. Patient Active Problem List    Diagnosis Date Noted    Acute non-recurrent pansinusitis 03/04/2021    Essential hypertension 12/21/2017    Generalized anxiety disorder 08/23/2017    Gastroesophageal reflux disease without esophagitis 08/23/2017    Fibrocystic breast 03/15/2013    Cervical dysplasia, mild 02/27/2012     Past Medical History:   Diagnosis Date    Essential hypertension 12/21/2017    Gastroesophageal reflux disease without esophagitis 8/23/2017    Generalized anxiety disorder       No past surgical history on file. Current Outpatient Medications   Medication Sig Dispense Refill    azithromycin (ZITHROMAX) 250 MG tablet Take 1 tablet by mouth See Admin Instructions for 5 days 500mg on day 1 followed by 250mg on days 2 - 5 6 tablet 0    predniSONE (DELTASONE) 10 MG tablet Take 1 tablet by mouth 2 times daily for 3 days 6 tablet 0    promethazine-codeine (PHENERGAN WITH CODEINE) 6.25-10 MG/5ML syrup Take 5 mLs by mouth every 4 hours as needed for Cough for up to 14 days.  100 mL 0    amLODIPine (NORVASC) 2.5 MG tablet TAKE 1 TABLET BY MOUTH EVERY DAY 90 tablet 1    norgestimate-ethinyl estradiol (ORTHO-CYCLEN) 0.25-35 MG-MCG per tablet TAKE 1 TABLET DAILY 84 tablet 1    busPIRone (BUSPAR) 10 MG tablet Take 1 tablet by mouth twice daily as needed for anxiety 60 tablet 3    metoprolol succinate (TOPROL XL) 25 MG extended release tablet Take 1 tablet by mouth once daily 30 tablet 3    fluticasone (FLONASE) 50 MCG/ACT nasal spray 2 sprays by Each Nostril route daily 1 Bottle 0     No current facility-administered medications for this visit. No Known Allergies  Social History     Tobacco Use    Smoking status: Former    Smokeless tobacco: Never   Substance Use Topics    Alcohol use: Yes      Family History   Problem Relation Age of Onset    Colon Cancer Father 48       Review of Systems   Constitutional:  Negative for chills, fatigue and fever. HENT:  Positive for congestion, postnasal drip, sinus pressure, sinus pain and sore throat. Negative for ear pain and nosebleeds. Respiratory:  Positive for cough. Negative for chest tightness and wheezing. Cardiovascular:  Negative for chest pain, palpitations and leg swelling. Gastrointestinal:  Negative for abdominal pain and constipation. Genitourinary:  Negative for dysuria and urgency. Musculoskeletal: Negative. Negative for arthralgias. Skin:  Negative for rash. Neurological:  Negative for dizziness and headaches. Psychiatric/Behavioral: Negative. Vitals:    08/25/22 1505   BP: 118/70   Site: Left Upper Arm   Position: Sitting   Cuff Size: Large Adult   Pulse: 86   Resp: 18   SpO2: 98%   Weight: 124 lb (56.2 kg)   Height: 5' 2\" (1.575 m)     Body mass index is 22.68 kg/m². Physical Exam  Constitutional:       General: She is not in acute distress. Appearance: She is well-developed. She is not diaphoretic. HENT:      Head: Normocephalic and atraumatic. Nose: Congestion present. Mouth/Throat:      Pharynx: Oropharyngeal exudate and posterior oropharyngeal erythema present. Eyes:      General: No scleral icterus. Right eye: No discharge. Left eye: No discharge. Conjunctiva/sclera: Conjunctivae normal.      Pupils: Pupils are equal, round, and reactive to light. Neck:      Thyroid: No thyromegaly. Vascular: No JVD. Cardiovascular:      Rate and Rhythm: Normal rate and regular rhythm. Heart sounds: No murmur heard. Pulmonary:      Breath sounds: Rales present. No wheezing.    Chest:      Chest wall: No tenderness. Abdominal:      General: Bowel sounds are normal. There is no distension. Tenderness: There is no guarding. Musculoskeletal:      Cervical back: Normal range of motion. Lymphadenopathy:      Cervical: No cervical adenopathy. Skin:     General: Skin is dry. Findings: No erythema or rash. Neurological:      Mental Status: She is oriented to person, place, and time. Cranial Nerves: No cranial nerve deficit. Coordination: Coordination normal.      Deep Tendon Reflexes: Reflexes are normal and symmetric. Psychiatric:         Behavior: Behavior normal.         Thought Content: Thought content normal.         Judgment: Judgment normal.       Lab Review   Office Visit on 07/22/2022   Component Date Value    Strep A Ag 07/22/2022 None Detected            ASSESSMENT/PLAN:    Acute bronchitis and bronchiolitis    Sore throat    Nasal congestion    Body aches    Cough    Symptoms ongoing over 48 hours  Tested negative for COVID last night    Rx  -     azithromycin (ZITHROMAX) 250 MG tablet; Take 1 tablet by mouth See Admin Instructions for 5 days 500mg on day 1 followed by 250mg on days 2 - 5  -     predniSONE (DELTASONE) 10 MG tablet; Take 1 tablet by mouth 2 times daily for 3 days    -     promethazine-codeine (PHENERGAN WITH CODEINE) 6.25-10 MG/5ML syrup; Take 5 mLs by mouth every 4 hours as needed for Cough for up to 14 days. If sx not improving and resolving , if sx continue or re-occur pt has been instructed to call us and / or return here for follow- up evaluation            No orders of the defined types were placed in this encounter.     New Prescriptions    AZITHROMYCIN (ZITHROMAX) 250 MG TABLET    Take 1 tablet by mouth See Admin Instructions for 5 days 500mg on day 1 followed by 250mg on days 2 - 5    PREDNISONE (DELTASONE) 10 MG TABLET    Take 1 tablet by mouth 2 times daily for 3 days    PROMETHAZINE-CODEINE (PHENERGAN WITH CODEINE) 6.25-10 MG/5ML SYRUP    Take 5 mLs by mouth every 4 hours as needed for Cough for up to 14 days. No follow-ups on file. There are no Patient Instructions on file for this visit. EMR Dragon/transcription disclaimer:Significant part of this  encounter note is electronic transcription/translationof spoken language to printed text. The electronic translation of spoken language may be erroneous, or at times, nonsensical words or phrases may be inadvertently transcribed.  Although I have reviewed the note for sucherrors, some may still exist.

## 2022-09-08 LAB
CHOLESTEROL, TOTAL: 193 MG/DL (ref 160–199)
GLUCOSE BLD-MCNC: 105 MG/DL (ref 74–109)
HDLC SERPL-MCNC: 75 MG/DL (ref 65–121)
LDL CHOLESTEROL CALCULATED: 101 MG/DL
TRIGL SERPL-MCNC: 87 MG/DL (ref 0–149)

## 2022-10-20 DIAGNOSIS — K21.9 GASTROESOPHAGEAL REFLUX DISEASE WITHOUT ESOPHAGITIS: ICD-10-CM

## 2022-10-20 DIAGNOSIS — E55.9 VITAMIN D DEFICIENCY: ICD-10-CM

## 2022-10-20 DIAGNOSIS — F41.1 GENERALIZED ANXIETY DISORDER: ICD-10-CM

## 2022-10-20 DIAGNOSIS — Z00.00 ANNUAL PHYSICAL EXAM: ICD-10-CM

## 2022-10-20 DIAGNOSIS — Z12.4 ENCOUNTER FOR PAPANICOLAOU SMEAR FOR CERVICAL CANCER SCREENING: ICD-10-CM

## 2022-10-20 DIAGNOSIS — I10 ESSENTIAL HYPERTENSION: ICD-10-CM

## 2022-10-20 LAB
ALBUMIN SERPL-MCNC: 4.7 G/DL (ref 3.5–5.2)
ALP BLD-CCNC: 50 U/L (ref 35–104)
ALT SERPL-CCNC: 18 U/L (ref 5–33)
ANION GAP SERPL CALCULATED.3IONS-SCNC: 11 MMOL/L (ref 7–19)
AST SERPL-CCNC: 15 U/L (ref 5–32)
BACTERIA: NEGATIVE /HPF
BASOPHILS ABSOLUTE: 0.1 K/UL (ref 0–0.2)
BASOPHILS RELATIVE PERCENT: 0.8 % (ref 0–1)
BILIRUB SERPL-MCNC: 0.5 MG/DL (ref 0.2–1.2)
BILIRUBIN URINE: NEGATIVE
BLOOD, URINE: ABNORMAL
BUN BLDV-MCNC: 19 MG/DL (ref 6–20)
CALCIUM SERPL-MCNC: 9.7 MG/DL (ref 8.6–10)
CHLORIDE BLD-SCNC: 106 MMOL/L (ref 98–111)
CHOLESTEROL, TOTAL: 190 MG/DL (ref 160–199)
CLARITY: ABNORMAL
CO2: 25 MMOL/L (ref 22–29)
COLOR: YELLOW
CREAT SERPL-MCNC: 0.8 MG/DL (ref 0.5–0.9)
CRYSTALS, UA: ABNORMAL /HPF
EOSINOPHILS ABSOLUTE: 0.3 K/UL (ref 0–0.6)
EOSINOPHILS RELATIVE PERCENT: 3.4 % (ref 0–5)
EPITHELIAL CELLS, UA: 8 /HPF (ref 0–5)
GFR SERPL CREATININE-BSD FRML MDRD: >60 ML/MIN/{1.73_M2}
GLUCOSE BLD-MCNC: 95 MG/DL (ref 74–109)
GLUCOSE URINE: NEGATIVE MG/DL
HCT VFR BLD CALC: 41.2 % (ref 37–47)
HDLC SERPL-MCNC: 74 MG/DL (ref 65–121)
HEMOGLOBIN: 13.3 G/DL (ref 12–16)
HYALINE CASTS: 1 /HPF (ref 0–8)
IMMATURE GRANULOCYTES #: 0 K/UL
KETONES, URINE: ABNORMAL MG/DL
LDL CHOLESTEROL CALCULATED: 102 MG/DL
LEUKOCYTE ESTERASE, URINE: NEGATIVE
LYMPHOCYTES ABSOLUTE: 2.6 K/UL (ref 1.1–4.5)
LYMPHOCYTES RELATIVE PERCENT: 28.6 % (ref 20–40)
MCH RBC QN AUTO: 30.6 PG (ref 27–31)
MCHC RBC AUTO-ENTMCNC: 32.3 G/DL (ref 33–37)
MCV RBC AUTO: 94.9 FL (ref 81–99)
MONOCYTES ABSOLUTE: 0.5 K/UL (ref 0–0.9)
MONOCYTES RELATIVE PERCENT: 5.6 % (ref 0–10)
NEUTROPHILS ABSOLUTE: 5.5 K/UL (ref 1.5–7.5)
NEUTROPHILS RELATIVE PERCENT: 61.3 % (ref 50–65)
NITRITE, URINE: NEGATIVE
PDW BLD-RTO: 12.5 % (ref 11.5–14.5)
PH UA: 5.5 (ref 5–8)
PLATELET # BLD: 277 K/UL (ref 130–400)
PMV BLD AUTO: 10.3 FL (ref 9.4–12.3)
POTASSIUM SERPL-SCNC: 4.7 MMOL/L (ref 3.5–5)
PROTEIN UA: NEGATIVE MG/DL
RBC # BLD: 4.34 M/UL (ref 4.2–5.4)
RBC UA: 3 /HPF (ref 0–4)
SODIUM BLD-SCNC: 142 MMOL/L (ref 136–145)
SPECIFIC GRAVITY UA: 1.03 (ref 1–1.03)
TOTAL PROTEIN: 6.6 G/DL (ref 6.6–8.7)
TRIGL SERPL-MCNC: 71 MG/DL (ref 0–149)
TSH SERPL DL<=0.05 MIU/L-ACNC: 4.49 UIU/ML (ref 0.27–4.2)
UROBILINOGEN, URINE: 0.2 E.U./DL
VITAMIN D 25-HYDROXY: 51 NG/ML
WBC # BLD: 9 K/UL (ref 4.8–10.8)
WBC UA: 1 /HPF (ref 0–5)

## 2022-10-24 ENCOUNTER — OFFICE VISIT (OUTPATIENT)
Dept: INTERNAL MEDICINE | Age: 42
End: 2022-10-24
Payer: COMMERCIAL

## 2022-10-24 VITALS
HEART RATE: 91 BPM | OXYGEN SATURATION: 99 % | BODY MASS INDEX: 23 KG/M2 | DIASTOLIC BLOOD PRESSURE: 72 MMHG | WEIGHT: 125 LBS | SYSTOLIC BLOOD PRESSURE: 118 MMHG | HEIGHT: 62 IN

## 2022-10-24 DIAGNOSIS — F41.1 GENERALIZED ANXIETY DISORDER: ICD-10-CM

## 2022-10-24 DIAGNOSIS — R79.89 ELEVATED TSH: ICD-10-CM

## 2022-10-24 DIAGNOSIS — Z12.31 ENCOUNTER FOR SCREENING MAMMOGRAM FOR BREAST CANCER: ICD-10-CM

## 2022-10-24 DIAGNOSIS — E55.9 VITAMIN D DEFICIENCY: ICD-10-CM

## 2022-10-24 DIAGNOSIS — Z12.12 SCREENING FOR COLORECTAL CANCER: ICD-10-CM

## 2022-10-24 DIAGNOSIS — Z00.00 ANNUAL PHYSICAL EXAM: Primary | ICD-10-CM

## 2022-10-24 DIAGNOSIS — I10 ESSENTIAL HYPERTENSION: ICD-10-CM

## 2022-10-24 DIAGNOSIS — Z12.11 SCREENING FOR COLORECTAL CANCER: ICD-10-CM

## 2022-10-24 DIAGNOSIS — R73.01 IFG (IMPAIRED FASTING GLUCOSE): ICD-10-CM

## 2022-10-24 LAB — T4 FREE: 1.09 NG/DL (ref 0.93–1.7)

## 2022-10-24 PROCEDURE — 99396 PREV VISIT EST AGE 40-64: CPT | Performed by: INTERNAL MEDICINE

## 2022-10-24 SDOH — ECONOMIC STABILITY: FOOD INSECURITY: WITHIN THE PAST 12 MONTHS, YOU WORRIED THAT YOUR FOOD WOULD RUN OUT BEFORE YOU GOT MONEY TO BUY MORE.: NEVER TRUE

## 2022-10-24 SDOH — ECONOMIC STABILITY: FOOD INSECURITY: WITHIN THE PAST 12 MONTHS, THE FOOD YOU BOUGHT JUST DIDN'T LAST AND YOU DIDN'T HAVE MONEY TO GET MORE.: NEVER TRUE

## 2022-10-24 ASSESSMENT — ENCOUNTER SYMPTOMS
VOMITING: 0
BLOOD IN STOOL: 0
TROUBLE SWALLOWING: 0
SORE THROAT: 0
CHEST TIGHTNESS: 0
ABDOMINAL PAIN: 0
CONSTIPATION: 0
VOICE CHANGE: 0
EYE PAIN: 0
EYE REDNESS: 0
DIARRHEA: 0
COUGH: 0
COLOR CHANGE: 0
SINUS PRESSURE: 0
NAUSEA: 0
WHEEZING: 0

## 2022-10-24 ASSESSMENT — SOCIAL DETERMINANTS OF HEALTH (SDOH): HOW HARD IS IT FOR YOU TO PAY FOR THE VERY BASICS LIKE FOOD, HOUSING, MEDICAL CARE, AND HEATING?: NOT HARD AT ALL

## 2022-10-24 NOTE — PROGRESS NOTES
Chief Complaint:   Sylvia Hameed is a 36 y.o. female who presents forcomplete physical exam.    History of Present Illness:      Sylvia Hameed is a 36 y.o. female who presents todayfor wellness visit AND follow up on her chronic medical conditions as noted below. Patient Active Problem List    Diagnosis Date Noted    Acute non-recurrent pansinusitis 03/04/2021    Essential hypertension 12/21/2017    Generalized anxiety disorder 08/23/2017    Gastroesophageal reflux disease without esophagitis 08/23/2017    Fibrocystic breast 03/15/2013    Cervical dysplasia, mild 02/27/2012       Past Medical History:   Diagnosis Date    Essential hypertension 12/21/2017    Gastroesophageal reflux disease without esophagitis 8/23/2017    Generalized anxiety disorder        No past surgical history on file. Current Outpatient Medications   Medication Sig Dispense Refill    amLODIPine (NORVASC) 2.5 MG tablet TAKE 1 TABLET BY MOUTH EVERY DAY 90 tablet 1    norgestimate-ethinyl estradiol (ORTHO-CYCLEN) 0.25-35 MG-MCG per tablet TAKE 1 TABLET DAILY 84 tablet 1    busPIRone (BUSPAR) 10 MG tablet Take 1 tablet by mouth twice daily as needed for anxiety 60 tablet 3    metoprolol succinate (TOPROL XL) 25 MG extended release tablet Take 1 tablet by mouth once daily 30 tablet 3    fluticasone (FLONASE) 50 MCG/ACT nasal spray 2 sprays by Each Nostril route daily 1 Bottle 0     No current facility-administered medications for this visit.      No Known Allergies    Social History     Socioeconomic History    Marital status:      Spouse name: None    Number of children: None    Years of education: None    Highest education level: None   Occupational History    Occupation: ultrasonographer   Tobacco Use    Smoking status: Former    Smokeless tobacco: Never   Substance and Sexual Activity    Alcohol use: Yes    Drug use: No    Sexual activity: Yes     Partners: Male     Social Determinants of Health     Financial Resource Strain: Low Risk     Difficulty of Paying Living Expenses: Not hard at all   Food Insecurity: No Food Insecurity    Worried About Running Out of Food in the Last Year: Never true    Ran Out of Food in the Last Year: Never true     Family History   Problem Relation Age of Onset    Colon Cancer Father 48        No past surgical history on file.       Lab Review   Orders Only on 10/20/2022   Component Date Value    TSH 10/20/2022 4.490 (A)     Color, UA 10/20/2022 YELLOW     Clarity, UA 10/20/2022 TURBID (A)     Glucose, Ur 10/20/2022 Negative     Bilirubin Urine 10/20/2022 Negative     Ketones, Urine 10/20/2022 TRACE (A)     Specific [place], UA 10/20/2022 1.031     Blood, Urine 10/20/2022 MODERATE (A)     pH, UA 10/20/2022 5.5     Protein, UA 10/20/2022 Negative     Urobilinogen, Urine 10/20/2022 0.2     Nitrite, Urine 10/20/2022 Negative     Leukocyte Esterase, Urine 10/20/2022 Negative     Vit D, 25-Hydroxy 10/20/2022 51.0     Cholesterol, Total 10/20/2022 190     Triglycerides 10/20/2022 71     HDL 10/20/2022 74     LDL Calculated 10/20/2022 102     WBC 10/20/2022 9.0     RBC 10/20/2022 4.34     Hemoglobin 10/20/2022 13.3     Hematocrit 10/20/2022 41.2     MCV 10/20/2022 94.9     MCH 10/20/2022 30.6     MCHC 10/20/2022 32.3 (A)     RDW 10/20/2022 12.5     Platelets 08/96/2899 277     MPV 10/20/2022 10.3     Neutrophils % 10/20/2022 61.3     Lymphocytes % 10/20/2022 28.6     Monocytes % 10/20/2022 5.6     Eosinophils % 10/20/2022 3.4     Basophils % 10/20/2022 0.8     Neutrophils Absolute 10/20/2022 5.5     Immature Granulocytes # 10/20/2022 0.0     Lymphocytes Absolute 10/20/2022 2.6     Monocytes Absolute 10/20/2022 0.50     Eosinophils Absolute 10/20/2022 0.30     Basophils Absolute 10/20/2022 0.10     Sodium 10/20/2022 142     Potassium 10/20/2022 4.7     Chloride 10/20/2022 106     CO2 10/20/2022 25     Anion Gap 10/20/2022 11     Glucose 10/20/2022 95     BUN 10/20/2022 19     Creatinine 10/20/2022 0.8     Est, Glom Filt Rate 10/20/2022 >60     Calcium 10/20/2022 9.7     Total Protein 10/20/2022 6.6     Albumin 10/20/2022 4.7     Total Bilirubin 10/20/2022 0.5     Alkaline Phosphatase 10/20/2022 50     ALT 10/20/2022 18     AST 10/20/2022 15     Bacteria, UA 10/20/2022 NEGATIVE (A)     Crystals, UA 10/20/2022 NEG (A)     Hyaline Casts, UA 10/20/2022 1     WBC, UA 10/20/2022 1     RBC, UA 10/20/2022 3     Epithelial Cells, UA 10/20/2022 8    Orders Only on 09/08/2022   Component Date Value    Glucose 09/08/2022 105     Cholesterol, Total 09/08/2022 193     Triglycerides 09/08/2022 87     HDL 09/08/2022 75     LDL Calculated 09/08/2022 101          Review of Systems   Constitutional:  Negative for chills, fatigue and fever. HENT:  Negative for congestion, ear pain, postnasal drip, sinus pressure, sore throat, trouble swallowing and voice change. Eyes:  Negative for pain, redness and visual disturbance. Respiratory:  Negative for cough, chest tightness and wheezing. Cardiovascular:  Negative for chest pain, palpitations and leg swelling. Gastrointestinal:  Negative for abdominal pain, blood in stool, constipation, diarrhea, nausea and vomiting. Endocrine: Negative for polydipsia and polyuria. Genitourinary:  Negative for dysuria, enuresis, flank pain, frequency and urgency. Musculoskeletal:  Negative for arthralgias, gait problem and joint swelling. Skin:  Negative for color change and rash. Neurological:  Negative for dizziness, tremors, syncope, facial asymmetry, speech difficulty, weakness, numbness and headaches. Psychiatric/Behavioral:  Negative for agitation, behavioral problems, confusion, sleep disturbance and suicidal ideas. The patient is not nervous/anxious.          Vitals:    10/24/22 0741   BP: 118/72   Pulse: 91   SpO2: 99%   Weight: 125 lb (56.7 kg)   Height: 5' 2\" (1.575 m)      Wt Readings from Last 3 Encounters:   10/24/22 125 lb (56.7 kg)   08/25/22 124 lb (56.2 kg)   07/22/22 127 lb 3.2 oz (57.7 kg)   Body mass index is 22.86 kg/m². BP Readings from Last 3 Encounters:   10/24/22 118/72   08/25/22 118/70   07/22/22 122/78       Physical Exam  Constitutional:       Appearance: She is well-developed. HENT:      Right Ear: External ear normal.      Left Ear: External ear normal.      Mouth/Throat:      Pharynx: No oropharyngeal exudate. Eyes:      Conjunctiva/sclera: Conjunctivae normal.      Pupils: Pupils are equal, round, and reactive to light. Neck:      Thyroid: No thyromegaly. Vascular: No JVD. Cardiovascular:      Rate and Rhythm: Normal rate. Heart sounds: Normal heart sounds. No murmur heard. Pulmonary:      Effort: No respiratory distress. Breath sounds: Normal breath sounds. No wheezing or rales. Chest:      Chest wall: No tenderness. Abdominal:      General: Bowel sounds are normal.      Palpations: Abdomen is soft. Musculoskeletal:      Cervical back: Neck supple. Lymphadenopathy:      Cervical: No cervical adenopathy. Skin:     Findings: No rash. Neurological:      Mental Status: She is oriented to person, place, and time.          ASSESSMENT/PLAN    Annual physical exam     Plan mammogram -mammogram orders to the patient  Fasting lab results all discussed with the patient-fasting blood sugar and lipid panel- [id number]  FBS 95  PAP 2021  Cscope needed since dad had at age 48     Essential hypertension  Currently blood pressure is in good range  Rx   Amlodipine 2.5 and metoproll     Generalized anxiety disorder  Sx better        Vit D deficiency  Level now 51   ( was 28 )  Has not been taking vit D  Take vit d 2000 iu daily       Orders Placed This Encounter   Procedures    GUERDA DIGITAL SCREEN W OR WO CAD BILATERAL    CBC with Auto Differential    Comprehensive Metabolic Panel    Lipid Panel    Urinalysis    TSH    Vitamin D 25 Hydroxy    Hemoglobin A1C    T4, Free    [facility], [place]     New Prescriptions    No medications on file      There are no Patient Instructions on file for this visit. Return in about 1 year (around 10/24/2023) for Annual Physical.   EMR Dragon/transcription disclaimer:Significant part of this  encounter note is electronic transcription/translation of spoken language to printed text. The electronic translation of spoken language may beerroneous, or at times, nonsensical words or phrases may be inadvertently transcribed.  Although I have reviewed the note for such errors, some may still exist.

## 2022-11-14 NOTE — PROCEDURE: MEDICATION COUNSELING
Benzoyl Peroxide Pregnancy And Lactation Text: This medication is Pregnancy Category C. It is unknown if benzoyl peroxide is excreted in breast milk. Dapsone Pregnancy And Lactation Text: This medication is Pregnancy Category C and is not considered safe during pregnancy or breast feeding. Tazorac Pregnancy And Lactation Text: This medication is not safe during pregnancy. It is unknown if this medication is excreted in breast milk. Minocycline Counseling: Patient advised regarding possible photosensitivity and discoloration of the teeth, skin, lips, tongue and gums.  Patient instructed to avoid sunlight, if possible.  When exposed to sunlight, patients should wear protective clothing, sunglasses, and sunscreen.  The patient was instructed to call the office immediately if the following severe adverse effects occur:  hearing changes, easy bruising/bleeding, severe headache, or vision changes.  The patient verbalized understanding of the proper use and possible adverse effects of minocycline.  All of the patient's questions and concerns were addressed. Topical Sulfur Applications Pregnancy And Lactation Text: This medication is Pregnancy Category C and has an unknown safety profile during pregnancy. It is unknown if this topical medication is excreted in breast milk. Spironolactone Counseling: Patient advised regarding risks of diarrhea, abdominal pain, hyperkalemia, birth defects (for female patients), liver toxicity and renal toxicity. The patient may need blood work to monitor liver and kidney function and potassium levels while on therapy. The patient verbalized understanding of the proper use and possible adverse effects of spironolactone.  All of the patient's questions and concerns were addressed. Benzoyl Peroxide Counseling: Patient counseled that medicine may cause skin irritation and bleach clothing.  In the event of skin irritation, the patient was advised to reduce the amount of the drug applied or use it less frequently.   The patient verbalized understanding of the proper use and possible adverse effects of benzoyl peroxide.  All of the patient's questions and concerns were addressed. Azithromycin Counseling:  I discussed with the patient the risks of azithromycin including but not limited to GI upset, allergic reaction, drug rash, diarrhea, and yeast infections. Bactrim Counseling:  I discussed with the patient the risks of sulfa antibiotics including but not limited to GI upset, allergic reaction, drug rash, diarrhea, dizziness, photosensitivity, and yeast infections.  Rarely, more serious reactions can occur including but not limited to aplastic anemia, agranulocytosis, methemoglobinemia, blood dyscrasias, liver or kidney failure, lung infiltrates or desquamative/blistering drug rashes. Erythromycin Pregnancy And Lactation Text: This medication is Pregnancy Category B and is considered safe during pregnancy. It is also excreted in breast milk. Topical Retinoid counseling:  Patient advised to apply a pea-sized amount only at bedtime and wait 30 minutes after washing their face before applying.  If too drying, patient may add a non-comedogenic moisturizer. The patient verbalized understanding of the proper use and possible adverse effects of retinoids.  All of the patient's questions and concerns were addressed. Doxycycline Counseling:  Patient counseled regarding possible photosensitivity and increased risk for sunburn.  Patient instructed to avoid sunlight, if possible.  When exposed to sunlight, patients should wear protective clothing, sunglasses, and sunscreen.  The patient was instructed to call the office immediately if the following severe adverse effects occur:  hearing changes, easy bruising/bleeding, severe headache, or vision changes.  The patient verbalized understanding of the proper use and possible adverse effects of doxycycline.  All of the patient's questions and concerns were addressed. Birth Control Pills Counseling: Birth Control Pill Counseling: I discussed with the patient the potential side effects of OCPs including but not limited to increased risk of stroke, heart attack, thrombophlebitis, deep venous thrombosis, hepatic adenomas, breast changes, GI upset, headaches, and depression.  The patient verbalized understanding of the proper use and possible adverse effects of OCPs. All of the patient's questions and concerns were addressed. Minocycline Pregnancy And Lactation Text: This medication is Pregnancy Category D and not consider safe during pregnancy. It is also excreted in breast milk. Azithromycin Pregnancy And Lactation Text: This medication is considered safe during pregnancy and is also secreted in breast milk. Bactrim Pregnancy And Lactation Text: This medication is Pregnancy Category D and is known to cause fetal risk.  It is also excreted in breast milk. Isotretinoin Counseling: Patient should get monthly blood tests, not donate blood, not drive at night if vision affected, not share medication, and not undergo elective surgery for 6 months after tx completed. Side effects reviewed, pt to contact office should one occur. Topical Clindamycin Counseling: Patient counseled that this medication may cause skin irritation or allergic reactions.  In the event of skin irritation, the patient was advised to reduce the amount of the drug applied or use it less frequently.   The patient verbalized understanding of the proper use and possible adverse effects of clindamycin.  All of the patient's questions and concerns were addressed. Spironolactone Pregnancy And Lactation Text: This medication can cause feminization of the male fetus and should be avoided during pregnancy. The active metabolite is also found in breast milk. Sarecycline Counseling: Patient advised regarding possible photosensitivity and discoloration of the teeth, skin, lips, tongue and gums.  Patient instructed to avoid sunlight, if possible.  When exposed to sunlight, patients should wear protective clothing, sunglasses, and sunscreen.  The patient was instructed to call the office immediately if the following severe adverse effects occur:  hearing changes, easy bruising/bleeding, severe headache, or vision changes.  The patient verbalized understanding of the proper use and possible adverse effects of sarecycline.  All of the patient's questions and concerns were addressed. Detail Level: Zone Doxycycline Pregnancy And Lactation Text: This medication is Pregnancy Category D and not consider safe during pregnancy. It is also excreted in breast milk but is considered safe for shorter treatment courses. Use Enhanced Medication Counseling?: No Topical Retinoid Pregnancy And Lactation Text: This medication is Pregnancy Category C. It is unknown if this medication is excreted in breast milk. Birth Control Pills Pregnancy And Lactation Text: This medication should be avoided if pregnant and for the first 30 days post-partum. Topical Clindamycin Pregnancy And Lactation Text: This medication is Pregnancy Category B and is considered safe during pregnancy. It is unknown if it is excreted in breast milk. Tetracycline Counseling: Patient counseled regarding possible photosensitivity and increased risk for sunburn.  Patient instructed to avoid sunlight, if possible.  When exposed to sunlight, patients should wear protective clothing, sunglasses, and sunscreen.  The patient was instructed to call the office immediately if the following severe adverse effects occur:  hearing changes, easy bruising/bleeding, severe headache, or vision changes.  The patient verbalized understanding of the proper use and possible adverse effects of tetracycline.  All of the patient's questions and concerns were addressed. Patient understands to avoid pregnancy while on therapy due to potential birth defects. Isotretinoin Pregnancy And Lactation Text: This medication is Pregnancy Category X and is considered extremely dangerous during pregnancy. It is unknown if it is excreted in breast milk. Tazorac Counseling:  Patient advised that medication is irritating and drying.  Patient may need to apply sparingly and wash off after an hour before eventually leaving it on overnight.  The patient verbalized understanding of the proper use and possible adverse effects of tazorac.  All of the patient's questions and concerns were addressed. Dapsone Counseling: I discussed with the patient the risks of dapsone including but not limited to hemolytic anemia, agranulocytosis, rashes, methemoglobinemia, kidney failure, peripheral neuropathy, headaches, GI upset, and liver toxicity.  Patients who start dapsone require monitoring including baseline LFTs and weekly CBCs for the first month, then every month thereafter.  The patient verbalized understanding of the proper use and possible adverse effects of dapsone.  All of the patient's questions and concerns were addressed. High Dose Vitamin A Pregnancy And Lactation Text: High dose vitamin A therapy is contraindicated during pregnancy and breast feeding. Erythromycin Counseling:  I discussed with the patient the risks of erythromycin including but not limited to GI upset, allergic reaction, drug rash, diarrhea, increase in liver enzymes, and yeast infections. High Dose Vitamin A Counseling: Side effects reviewed, pt to contact office should one occur. Topical Sulfur Applications Counseling: Topical Sulfur Counseling: Patient counseled that this medication may cause skin irritation or allergic reactions.  In the event of skin irritation, the patient was advised to reduce the amount of the drug applied or use it less frequently.   The patient verbalized understanding of the proper use and possible adverse effects of topical sulfur application.  All of the patient's questions and concerns were addressed. Winlevi Counseling:  I discussed with the patient the risks of topical clascoterone including but not limited to erythema, scaling, itching, and stinging. Patient voiced their understanding. Winlevi Pregnancy And Lactation Text: This medication is considered safe during pregnancy and breastfeeding. Azelaic Acid Pregnancy And Lactation Text: This medication is considered safe during pregnancy and breast feeding. Azelaic Acid Counseling: Patient counseled that medicine may cause skin irritation and to avoid applying near the eyes.  In the event of skin irritation, the patient was advised to reduce the amount of the drug applied or use it less frequently.   The patient verbalized understanding of the proper use and possible adverse effects of azelaic acid.  All of the patient's questions and concerns were addressed. Propranolol Pregnancy And Lactation Text: This medication is Pregnancy Category C and it isn't known if it is safe during pregnancy. It is excreted in breast milk.

## 2022-12-22 ENCOUNTER — HOSPITAL ENCOUNTER (OUTPATIENT)
Dept: WOMENS IMAGING | Age: 42
Discharge: HOME | End: 2022-12-22
Payer: COMMERCIAL

## 2022-12-22 DIAGNOSIS — Z12.31 ENCOUNTER FOR SCREENING MAMMOGRAM FOR BREAST CANCER: ICD-10-CM

## 2022-12-22 PROCEDURE — 77067 SCR MAMMO BI INCL CAD: CPT

## 2023-01-12 ENCOUNTER — TELEPHONE (OUTPATIENT)
Dept: GASTROENTEROLOGY | Age: 43
End: 2023-01-12

## 2023-01-12 RX ORDER — ONDANSETRON 4 MG/1
TABLET, FILM COATED ORAL
Qty: 15 TABLET | Refills: 0 | Status: SHIPPED | OUTPATIENT
Start: 2023-01-12

## 2023-01-12 RX ORDER — ONDANSETRON 4 MG/1
TABLET, FILM COATED ORAL
Qty: 15 TABLET | Refills: 0 | Status: SHIPPED | OUTPATIENT
Start: 2023-01-12 | End: 2023-01-12 | Stop reason: SDUPTHER

## 2023-01-12 NOTE — TELEPHONE ENCOUNTER
Patient has a CLN scheduled for 1/19. She asked if a script for zofran could be sent in for nausea due to the prep. [facility] uses the [facility] pharmacy. Thank you!

## 2023-01-16 ENCOUNTER — TELEPHONE (OUTPATIENT)
Dept: GASTROENTEROLOGY | Age: 43
End: 2023-01-16

## 2023-01-16 NOTE — TELEPHONE ENCOUNTER
Patient returning call to [place].      Please call her back at the [place] dept [phone] or her cell [phone], she states if she doesn't hear back she will be calling again.

## 2023-01-16 NOTE — TELEPHONE ENCOUNTER
Called . patient to remind them of their procedure  at [facility]  on 1/19/23 arrive at 7:00AM     NO ANS / LM

## 2023-01-16 NOTE — TELEPHONE ENCOUNTER
Renetta requests that office return their call. The best time to reach her is  as soon as possible . Patient states she would like a different prep she does not want to wake up at 2am she stated her  got a \"movie prep\" but shes unsure if that is the exact name of it. Please call her back at the [address] [phone] or her cell [phone], she states if she doesn't hear back she will be calling again. Thank you.

## 2023-01-19 ENCOUNTER — ANESTHESIA EVENT (OUTPATIENT)
Dept: OPERATING ROOM | Age: 43
End: 2023-01-19

## 2023-01-19 ENCOUNTER — HOSPITAL ENCOUNTER (OUTPATIENT)
Age: 43
Setting detail: OUTPATIENT SURGERY
Discharge: HOME | End: 2023-01-19
Attending: INTERNAL MEDICINE | Admitting: INTERNAL MEDICINE
Payer: COMMERCIAL

## 2023-01-19 ENCOUNTER — ANESTHESIA (OUTPATIENT)
Dept: OPERATING ROOM | Age: 43
End: 2023-01-19

## 2023-01-19 ENCOUNTER — APPOINTMENT (OUTPATIENT)
Dept: OPERATING ROOM | Age: 43
End: 2023-01-19

## 2023-01-19 VITALS
OXYGEN SATURATION: 100 % | HEIGHT: 62 IN | HEART RATE: 78 BPM | SYSTOLIC BLOOD PRESSURE: 103 MMHG | DIASTOLIC BLOOD PRESSURE: 74 MMHG | TEMPERATURE: 97.5 F | BODY MASS INDEX: 23 KG/M2 | WEIGHT: 125 LBS | RESPIRATION RATE: 18 BRPM

## 2023-01-19 PROCEDURE — G0105 COLORECTAL SCRN; HI RISK IND: HCPCS

## 2023-01-19 PROCEDURE — 45378 DIAGNOSTIC COLONOSCOPY: CPT | Performed by: INTERNAL MEDICINE

## 2023-01-19 RX ORDER — SODIUM CHLORIDE, SODIUM LACTATE, POTASSIUM CHLORIDE, CALCIUM CHLORIDE 600; 310; 30; 20 MG/100ML; MG/100ML; MG/100ML; MG/100ML
INJECTION, SOLUTION INTRAVENOUS CONTINUOUS
Status: DISCONTINUED | OUTPATIENT
Start: 2023-01-19 | End: 2023-01-19 | Stop reason: HOSPADM

## 2023-01-19 RX ORDER — LIDOCAINE HYDROCHLORIDE 10 MG/ML
INJECTION, SOLUTION EPIDURAL; INFILTRATION; INTRACAUDAL; PERINEURAL PRN
Status: DISCONTINUED | OUTPATIENT
Start: 2023-01-19 | End: 2023-01-19 | Stop reason: SDUPTHER

## 2023-01-19 RX ORDER — PROPOFOL 10 MG/ML
INJECTION, EMULSION INTRAVENOUS PRN
Status: DISCONTINUED | OUTPATIENT
Start: 2023-01-19 | End: 2023-01-19 | Stop reason: SDUPTHER

## 2023-01-19 RX ADMIN — SODIUM CHLORIDE, SODIUM LACTATE, POTASSIUM CHLORIDE, CALCIUM CHLORIDE: 600; 310; 30; 20 INJECTION, SOLUTION INTRAVENOUS at 07:38

## 2023-01-19 RX ADMIN — LIDOCAINE HYDROCHLORIDE 30 MG: 10 INJECTION, SOLUTION EPIDURAL; INFILTRATION; INTRACAUDAL; PERINEURAL at 08:13

## 2023-01-19 RX ADMIN — PROPOFOL 300 MG: 10 INJECTION, EMULSION INTRAVENOUS at 08:13

## 2023-01-19 NOTE — OP NOTE
Patient: Renetta Patrick : 1980  Med Rec#: [id number] Acc#: [id number]   Primary Care Provider Tali Perrin MD    Date of Procedure:  2023    Endoscopist: Osito Hillman MD    Referring Provider: Tali Perrin MD    Operation Performed: Colonoscopy     Indications: Screening- family hx of colon CA (father)    Anesthesia:  Sedation was administered by anesthesia who monitored the patient during the procedure.    I met with Renetta Patrick prior to procedure. We discussed the procedure itself, and I have discussed the risks of endoscopy (including-- but not limited to-- pain, discomfort, bleeding potentially requiring second endoscopic procedure and/or blood transfusion, organ perforation requiring operative repair, damage to organs near the colon, infection, aspiration, cardiopulmonary/allergic reaction), benefits, indications to endoscopy. Additionally, we discussed options other than colonoscopy. The patient expressed understanding. All questions answered. The patient decided to proceed with the procedure.  Signed informed consent was placed on the chart.    Blood Loss: minimal    Withdrawal time: > 6 min  Bowel Prep: adequate     Complications: no immediate complications    DESCRIPTION OF PROCEDURE:     A time out was performed. After written informed consent was obtained, the patient was placed in the left lateral position.     The perianal area was inspected, and a digital rectal exam was performed. A rectal exam was performed: normal tone, no palpable lesions. At this point, a forward viewing Olympus colonoscope was inserted into the anus and carefully advanced to the cecum.  The cecum was identified by the ileocecal valve and the appendiceal orifice. The colonoscope was then slowly withdrawn with careful inspection of the mucosa in a linear and circumferential fashion. The scope was retroflexed in the rectum. Suction was utilized during the procedure to remove as much air as possible from the bowel.  The colonoscope was removed from the patient, and the procedure was terminated. Findings are listed below. Findings: The mucosa appeared normal throughout the entire examined colon. Retroflexion in the rectum was normal and revealed no further abnormalities. Recommendations:  1. Repeat colonoscopy: 5 years, due to family hx     Findings and recommendations were discussed w/ the patient. A copy of the images was provided. Cecille Rivero am scribing for and in the presence of Dr. Khushbu Villanueva MD.  Electronically signed by Red Thakkar RN on 1/19/2023 at 7:39 AM    I personally performed the services described in this documentation as scribed by Deena Gutierrez, and it appears accurate and complete.      Khushbu Villanueva MD  1/19/2023

## 2023-01-19 NOTE — ANESTHESIA PRE PROCEDURE
Department of Anesthesiology  Preprocedure Note       Name:  Juan Najera   Age:  36 y.o.  :  1980                                          MRN:  [id number]         Date:  2023      Surgeon: Jillian López):  Ulises Garcia MD    Procedure: Procedure(s):  COLORECTAL CANCER SCREENING, NOT HIGH RISK    Medications prior to admission:   Prior to Admission medications    Medication Sig Start Date End Date Taking? Authorizing Provider   ondansetron (ZOFRAN) 4 MG tablet Take one tablet 1 hour prior to starting bowel prep. If nausea occurs take a second tablet. Take 1-2 tablets every 6 hours for nausea. 23   KATE Cook - NP   amLODIPine (NORVASC) 2.5 MG tablet TAKE 1 TABLET BY MOUTH EVERY DAY 22   Bret Melendez MD   norgestimate-ethinyl estradiol (ORTHO-CYCLEN) 0.25-35 MG-MCG per tablet TAKE 1 TABLET DAILY 22   KATE Colon - CNP   busPIRone (BUSPAR) 10 MG tablet Take 1 tablet by mouth twice daily as needed for anxiety 22   Bret Melendez MD   metoprolol succinate (TOPROL XL) 25 MG extended release tablet Take 1 tablet by mouth once daily 22   Bret Melendez MD   fluticasone [facility]) 50 MCG/ACT nasal spray 2 sprays by Each Nostril route daily 21   Farnaz Dickey MD       Current medications:    No current facility-administered medications for this encounter.        Allergies:  No Known Allergies    Problem List:    Patient Active Problem List   Diagnosis Code    Cervical dysplasia, mild N87.0    Fibrocystic breast N60.19    Generalized anxiety disorder F41.1    Gastroesophageal reflux disease without esophagitis K21.9    Essential hypertension I10    Acute non-recurrent pansinusitis J01.40       Past Medical History:        Diagnosis Date    Essential hypertension 2017    Gastroesophageal reflux disease without esophagitis 2017    Generalized anxiety disorder        Past Surgical History:        Procedure Laterality Date    MOUTH SURGERY      teeth removed under anesthesia       Social History:    Social History     Tobacco Use    Smoking status: Former    Smokeless tobacco: Never   Substance Use Topics    Alcohol use: Yes                                Counseling given: Not Answered      Vital Signs (Current):   Vitals:    01/19/23 0729   BP: (!) 95/58   Pulse: 80   Resp: 16   Temp: 98.7 °F (37.1 °C)   SpO2: 100%   Weight: 125 lb (56.7 kg)   Height: 5' 2\" (1.575 m)                                              BP Readings from Last 3 Encounters:   01/19/23 (!) 95/58   10/24/22 118/72   08/25/22 118/70       NPO Status: Time of last liquid consumption: 0245                        Time of last solid consumption: 2300                        Date of last liquid consumption: 01/19/23                        Date of last solid food consumption: 01/17/23    BMI:   Wt Readings from Last 3 Encounters:   01/19/23 125 lb (56.7 kg)   10/24/22 125 lb (56.7 kg)   08/25/22 124 lb (56.2 kg)     Body mass index is 22.86 kg/m². CBC:   Lab Results   Component Value Date/Time    WBC 9.0 10/20/2022 07:50 AM    RBC 4.34 10/20/2022 07:50 AM    HGB 13.3 10/20/2022 07:50 AM    HCT 41.2 10/20/2022 07:50 AM    MCV 94.9 10/20/2022 07:50 AM    RDW 12.5 10/20/2022 07:50 AM     10/20/2022 07:50 AM       CMP:   Lab Results   Component Value Date/Time     10/20/2022 07:50 AM    K 4.7 10/20/2022 07:50 AM     10/20/2022 07:50 AM    CO2 25 10/20/2022 07:50 AM    BUN 19 10/20/2022 07:50 AM    CREATININE 0.8 10/20/2022 07:50 AM    GFRAA >59 10/20/2021 07:36 AM    LABGLOM >60 10/20/2022 07:50 AM    GLUCOSE 95 10/20/2022 07:50 AM    PROT 6.6 10/20/2022 07:50 AM    CALCIUM 9.7 10/20/2022 07:50 AM    BILITOT 0.5 10/20/2022 07:50 AM    ALKPHOS 50 10/20/2022 07:50 AM    AST 15 10/20/2022 07:50 AM    ALT 18 10/20/2022 07:50 AM       POC Tests: No results for input(s): POCGLU, POCNA, POCK, POCCL, POCBUN, POCHEMO, POCHCT in the last 72 hours.     Coags: No results found for: PROTIME, INR, APTT    HCG (If Applicable):   Lab Results   Component Value Date    PREGTESTUR Positive 09/28/2018        ABGs: No results found for: PHART, PO2ART, PNV5NFR, JZU8BUU, BEART, T6JTTNCP     Type & Screen (If Applicable):  No results found for: LABABO, LABRH    Drug/Infectious Status (If Applicable):  No results found for: HIV, HEPCAB    COVID-19 Screening (If Applicable): No results found for: COVID19        Anesthesia Evaluation  Patient summary reviewed and Nursing notes reviewed  Airway: Mallampati: II  TM distance: >3 FB   Neck ROM: full  Mouth opening: > = 3 FB   Dental: normal exam         Pulmonary:Negative Pulmonary ROS and normal exam                               Cardiovascular:  Exercise tolerance: good (>4 METS),   (+) hypertension:,          Beta Blocker:  Dose within 24 Hrs         Neuro/Psych:   Negative Neuro/Psych ROS  (+) psychiatric history:            GI/Hepatic/Renal:   (+) GERD:,           Endo/Other: Negative Endo/Other ROS                    Abdominal:             Vascular: negative vascular ROS. Other Findings:           Anesthesia Plan      general and TIVA     ASA 1       Induction: intravenous. Anesthetic plan and risks discussed with patient. Plan discussed with CRNA.                     KATE Hartley - CRNA   1/19/2023

## 2023-01-19 NOTE — ANESTHESIA POSTPROCEDURE EVALUATION
Department of Anesthesiology  Postprocedure Note    Patient: Yinka Haywood  MRN: [id number]  YOB: 1980  Date of evaluation: 1/19/2023      Procedure Summary     Date: 01/19/23 Room / Location: [facility] ENDO 01 / [address]    Anesthesia Start: 4019 Anesthesia Stop:     Procedure: COLORECTAL CANCER SCREENING, NOT HIGH RISK (Abdomen) Diagnosis:       FH: colon cancer      (FH: colon cancer [Z80.0])    Surgeons: Justina Mejia MD Responsible Provider: KATE Alejo CRNA    Anesthesia Type: general, TIVA ASA Status: 1          Anesthesia Type: No value filed.     Mook Phase I:      Mook Phase II:        Anesthesia Post Evaluation    Patient location during evaluation: bedside  Patient participation: complete - patient participated  Level of consciousness: sleepy but conscious  Pain score: 0  Airway patency: patent  Nausea & Vomiting: no nausea and no vomiting  Complications: no  Cardiovascular status: blood pressure returned to baseline  Respiratory status: acceptable, room air and spontaneous ventilation  Hydration status: euvolemic

## 2023-01-19 NOTE — DISCHARGE INSTRUCTIONS
Recommendations:  1. Repeat colonoscopy: 5 years, due to family hx     POST-OP ORDERS: ENDOSCOPY & COLONOSCOPY:    1. Rest today. 2. DO NOT eat or drink until wide awake; eat your usual diet today in moderate amount only. 3. DO NOT drive today. 4. Call physician if you have severe pain, vomiting, fever, rectal bleeding or black bowel movements. 5.  If a biopsy was taken or a polyp removed, you should expect to hear results in about 21 days. If you have heard nothing from your physician by then, call the office for results. 6.  Discharge home when patient awake, vitals signs stable and tolerating liquids.

## 2023-01-19 NOTE — H&P
Patient Name: Sugar Gagnon  : 1980  MRN: [id number]  DATE: 23    Allergies: No Known Allergies     ENDOSCOPY  History and Physical    Procedure:    [] Diagnostic Colonoscopy       [x] Screening Colonoscopy  [] EGD      [] ERCP      [] EUS       [] Other    [x] Previous office notes/History and Physical reviewed from the patients chart. Please see EMR for further details of HPI. I have examined the patient's status immediately prior to the procedure and:      Indications/HPI:       [x] Screening              [] History of Polyps      [x]Fhx of colon CA/polyps []+Cologard/DNA/Stool Testing      Anesthesia:   [x] MAC [] Moderate Sedation   [] General   [] None     ROS: 12 pt review of systems was negative unless stated above    Medications:   Prior to Admission medications    Medication Sig Start Date End Date Taking? Authorizing Provider   ondansetron (ZOFRAN) 4 MG tablet Take one tablet 1 hour prior to starting bowel prep. If nausea occurs take a second tablet. Take 1-2 tablets every 6 hours for nausea.  23   KATE Wayne - CHRIS   amLODIPine (NORVASC) 2.5 MG tablet TAKE 1 TABLET BY MOUTH EVERY DAY 22   Wilma Hopper MD   norgestimate-ethinyl estradiol (ORTHO-CYCLEN) 0.25-35 MG-MCG per tablet TAKE 1 TABLET DAILY 22   KATE Saenz - CNP   busPIRone (BUSPAR) 10 MG tablet Take 1 tablet by mouth twice daily as needed for anxiety 22   Wilma Hopper MD   metoprolol succinate (TOPROL XL) 25 MG extended release tablet Take 1 tablet by mouth once daily 22   Wilma Hopper MD   fluticasone [facility]) 50 MCG/ACT nasal spray 2 sprays by Each Nostril route daily 21   George Cormier MD       Past Medical History:  Past Medical History:   Diagnosis Date    Essential hypertension 2017    Gastroesophageal reflux disease without esophagitis 2017    Generalized anxiety disorder        Past Surgical History:  Past Surgical History:   Procedure Laterality Date    MOUTH SURGERY      teeth removed under anesthesia       Social History:  Social History     Tobacco Use    Smoking status: Former    Smokeless tobacco: Never   Vaping Use    Vaping Use: Never used   Substance Use Topics    Alcohol use: Yes    Drug use: No       Vital Signs:   Vitals:    01/19/23 0729   BP: (!) 95/58   Pulse: 80   Resp: 16   Temp: 98.7 °F (37.1 °C)   SpO2: 100%        Physical Exam:  Cardiac:  [x]WNL []Comments:  Pulmonary:  [x]WNL   []Comments:  Neuro/Mental Status:  [x]WNL  []Comments:  Abdominal:  [x]WNL    []Comments:  Other:   []WNL  []Comments:    Informed Consent:  The risks and benefits of the procedure have been discussed with either the patient or if they cannot consent, their representative. Assessment:  Patient examined and appropriate for planned sedation and procedure. Plan:  Proceed with planned sedation and procedure as above. Shameka Mckay am scribing for and in the presence of Dr. Mumtaz Brock MD.  Electronically signed by Raudel Pittman RN on 1/19/2023 at 7:38 AM    I personally performed the services described in this documentation as scribed by Lea Cogan, and it appears accurate and complete.      Mumtaz Brock MD  1/19/2023

## 2023-01-26 RX ORDER — NORGESTIMATE AND ETHINYL ESTRADIOL 0.25-0.035
KIT ORAL
Qty: 28 TABLET | Refills: 5 | Status: SHIPPED | OUTPATIENT
Start: 2023-01-26

## 2023-02-03 RX ORDER — METOPROLOL SUCCINATE 25 MG/1
TABLET, EXTENDED RELEASE ORAL
Qty: 30 TABLET | Refills: 5 | Status: SHIPPED | OUTPATIENT
Start: 2023-02-03

## 2023-02-03 RX ORDER — BUSPIRONE HYDROCHLORIDE 10 MG/1
TABLET ORAL
Qty: 60 TABLET | Refills: 4 | Status: SHIPPED | OUTPATIENT
Start: 2023-02-03

## 2023-02-03 NOTE — TELEPHONE ENCOUNTER
Wilmer Mcfarland called requesting a refill of the below medication which has been pended for you:     Requested Prescriptions     Pending Prescriptions Disp Refills    busPIRone (BUSPAR) 10 MG tablet [Pharmacy Med Name: busPIRone HCl 10 MG Oral Tablet] 60 tablet 4     Sig: Take 1 tablet by mouth twice daily as needed for anxiety    metoprolol succinate (TOPROL XL) 25 MG extended release tablet [Pharmacy Med Name: Metoprolol Succinate ER 25 MG Oral Tablet Extended Release 24 Hour] 30 tablet 5     Sig: Take 1 tablet by mouth once daily       Last Appointment Date: 10/24/2022  Next Appointment Date: Visit date not found    No Known Allergies

## 2023-02-17 RX ORDER — AMLODIPINE BESYLATE 2.5 MG/1
TABLET ORAL
Qty: 90 TABLET | Refills: 1 | Status: SHIPPED | OUTPATIENT
Start: 2023-02-17

## 2023-02-17 RX ORDER — AMLODIPINE BESYLATE 2.5 MG/1
TABLET ORAL
Qty: 90 TABLET | Refills: 0 | OUTPATIENT
Start: 2023-02-17

## 2023-02-27 ENCOUNTER — OFFICE VISIT (OUTPATIENT)
Dept: INTERNAL MEDICINE | Age: 43
End: 2023-02-27
Payer: COMMERCIAL

## 2023-02-27 VITALS
HEART RATE: 102 BPM | RESPIRATION RATE: 18 BRPM | BODY MASS INDEX: 23.92 KG/M2 | SYSTOLIC BLOOD PRESSURE: 108 MMHG | OXYGEN SATURATION: 100 % | DIASTOLIC BLOOD PRESSURE: 70 MMHG | HEIGHT: 62 IN | WEIGHT: 130 LBS

## 2023-02-27 DIAGNOSIS — R05.1 ACUTE COUGH: ICD-10-CM

## 2023-02-27 DIAGNOSIS — J20.9 ACUTE BRONCHITIS AND BRONCHIOLITIS: Primary | ICD-10-CM

## 2023-02-27 DIAGNOSIS — R09.81 SINUS CONGESTION: ICD-10-CM

## 2023-02-27 DIAGNOSIS — J21.9 ACUTE BRONCHITIS AND BRONCHIOLITIS: Primary | ICD-10-CM

## 2023-02-27 LAB
INFLUENZA A ANTIGEN, POC: NEGATIVE
INFLUENZA B ANTIGEN, POC: NEGATIVE
LOT EXPIRE DATE: NORMAL
LOT KIT NUMBER: NORMAL
SARS-COV-2, POC: NORMAL
VALID INTERNAL CONTROL: YES
VENDOR AND KIT NAME POC: NORMAL

## 2023-02-27 PROCEDURE — 99213 OFFICE O/P EST LOW 20 MIN: CPT | Performed by: INTERNAL MEDICINE

## 2023-02-27 PROCEDURE — 3074F SYST BP LT 130 MM HG: CPT | Performed by: INTERNAL MEDICINE

## 2023-02-27 PROCEDURE — 87428 SARSCOV & INF VIR A&B AG IA: CPT | Performed by: INTERNAL MEDICINE

## 2023-02-27 PROCEDURE — 3078F DIAST BP <80 MM HG: CPT | Performed by: INTERNAL MEDICINE

## 2023-02-27 RX ORDER — PREDNISONE 10 MG/1
10 TABLET ORAL 2 TIMES DAILY
Qty: 6 TABLET | Refills: 0 | Status: SHIPPED | OUTPATIENT
Start: 2023-02-27 | End: 2023-03-02

## 2023-02-27 RX ORDER — AZITHROMYCIN 250 MG/1
250 TABLET, FILM COATED ORAL SEE ADMIN INSTRUCTIONS
Qty: 6 TABLET | Refills: 0 | Status: SHIPPED | OUTPATIENT
Start: 2023-02-27 | End: 2023-03-04

## 2023-02-27 RX ORDER — GUAIFENESIN AND CODEINE PHOSPHATE 100; 10 MG/5ML; MG/5ML
5 SOLUTION ORAL 2 TIMES DAILY PRN
Qty: 75 ML | Refills: 0 | Status: SHIPPED | OUTPATIENT
Start: 2023-02-27 | End: 2023-03-02

## 2023-02-27 ASSESSMENT — PATIENT HEALTH QUESTIONNAIRE - PHQ9
SUM OF ALL RESPONSES TO PHQ QUESTIONS 1-9: 0
2. FEELING DOWN, DEPRESSED OR HOPELESS: 0
SUM OF ALL RESPONSES TO PHQ QUESTIONS 1-9: 0
SUM OF ALL RESPONSES TO PHQ QUESTIONS 1-9: 0
SUM OF ALL RESPONSES TO PHQ9 QUESTIONS 1 & 2: 0
SUM OF ALL RESPONSES TO PHQ QUESTIONS 1-9: 0
1. LITTLE INTEREST OR PLEASURE IN DOING THINGS: 0

## 2023-02-27 ASSESSMENT — ENCOUNTER SYMPTOMS
SHORTNESS OF BREATH: 1
CONSTIPATION: 0
WHEEZING: 0
COUGH: 1
SORE THROAT: 0
ABDOMINAL PAIN: 0
RHINORRHEA: 1
CHEST TIGHTNESS: 0
SINUS PAIN: 1

## 2023-02-27 NOTE — PROGRESS NOTES
Chief Complaint   Patient presents with    Chest Congestion     Started on Friday     Cough    Sinusitis    Fatigue     History of presenting illness:  Nicola Carrel is a35 y.o. female who presents today for follow up on her chronic medical conditions as noted below. Patient Active Problem List    Diagnosis Date Noted    Acute non-recurrent pansinusitis 03/04/2021    Essential hypertension 12/21/2017    Generalized anxiety disorder 08/23/2017    Gastroesophageal reflux disease without esophagitis 08/23/2017    Fibrocystic breast 03/15/2013    Cervical dysplasia, mild 02/27/2012     Past Medical History:   Diagnosis Date    Essential hypertension 12/21/2017    Gastroesophageal reflux disease without esophagitis 8/23/2017    Generalized anxiety disorder       Past Surgical History:   Procedure Laterality Date    COLONOSCOPY N/A 01/19/2023    Dr Lamine Null, 5 yr recall    MOUTH SURGERY      teeth removed under anesthesia     Current Outpatient Medications   Medication Sig Dispense Refill    guaiFENesin-codeine (TUSSI-ORGANIDIN NR) 100-10 MG/5ML syrup Take 5 mLs by mouth 2 times daily as needed for Cough for up to 3 days. Max Daily Amount: 10 mLs 75 mL 0    predniSONE (DELTASONE) 10 MG tablet Take 1 tablet by mouth 2 times daily for 3 days 6 tablet 0    azithromycin (ZITHROMAX) 250 MG tablet Take 1 tablet by mouth See Admin Instructions for 5 days 500mg on day 1 followed by 250mg on days 2 - 5 6 tablet 0    amLODIPine (NORVASC) 2.5 MG tablet TAKE ONE TABLET BY MOUTH ONE TIME DAILY 90 tablet 1    ondansetron (ZOFRAN) 4 MG tablet Take one tablet 1 hour prior to starting bowel prep. If nausea occurs take a second tablet. Take 1-2 tablets every 6 hours for nausea.  15 tablet 0    busPIRone (BUSPAR) 10 MG tablet Take 1 tablet by mouth twice daily as needed for anxiety 60 tablet 4    metoprolol succinate (TOPROL XL) 25 MG extended release tablet Take 1 tablet by mouth once daily 30 tablet 5    norgestimate-ethinyl estradiol (ORTHO-CYCLEN) 0.25-35 MG-MCG per tablet TAKE 1 TABLET BY MOUTH EVERY DAY 28 tablet 5    fluticasone (FLONASE) 50 MCG/ACT nasal spray 2 sprays by Each Nostril route daily 1 Bottle 0     No current facility-administered medications for this visit. No Known Allergies  Social History     Tobacco Use    Smoking status: Former    Smokeless tobacco: Never   Substance Use Topics    Alcohol use: Yes      Family History   Problem Relation Age of Onset    Colon Cancer Father 48       Review of Systems   Constitutional:  Positive for fatigue. Negative for chills and fever. HENT:  Positive for congestion, postnasal drip, rhinorrhea and sinus pain. Negative for ear pain, nosebleeds and sore throat. Respiratory:  Positive for cough and shortness of breath. Negative for chest tightness and wheezing. Cardiovascular:  Negative for chest pain, palpitations and leg swelling. Gastrointestinal:  Negative for abdominal pain and constipation. Genitourinary:  Negative for dysuria and urgency. Musculoskeletal: Negative. Negative for arthralgias. Skin:  Negative for rash. Neurological:  Negative for dizziness and headaches. Psychiatric/Behavioral: Negative. Vitals:    02/27/23 1245   BP: 108/70   Site: Left Upper Arm   Position: Sitting   Cuff Size: Small Adult   Pulse: (!) 102   Resp: 18   SpO2: 100%   Weight: 130 lb (59 kg)   Height: 5' 2\" (1.575 m)     Body mass index is 23.78 kg/m². Physical Exam  Constitutional:       Appearance: She is well-developed. HENT:      Right Ear: External ear normal.      Left Ear: External ear normal.      Mouth/Throat:      Pharynx: No oropharyngeal exudate. Eyes:      Conjunctiva/sclera: Conjunctivae normal.      Pupils: Pupils are equal, round, and reactive to light. Neck:      Thyroid: No thyromegaly. Vascular: No JVD. Cardiovascular:      Rate and Rhythm: Normal rate. Heart sounds: Normal heart sounds. No murmur heard.   Pulmonary:      Effort: No respiratory distress. Breath sounds: Rhonchi and rales present. No wheezing. Chest:      Chest wall: No tenderness. Abdominal:      General: Bowel sounds are normal.      Palpations: Abdomen is soft. Musculoskeletal:      Cervical back: Neck supple. Lymphadenopathy:      Cervical: No cervical adenopathy. Skin:     Findings: No rash. Neurological:      Mental Status: She is oriented to person, place, and time. Lab Review   No visits with results within 2 Month(s) from this visit. Latest known visit with results is:   Office Visit on 10/24/2022   Component Date Value    T4 Free 10/20/2022 1.09            ASSESSMENT/PLAN:    Acute bronchitis and bronchiolitis    Acute cough    Sinus congestion  Covid neg  rx  -     predniSONE (DELTASONE) 10 MG tablet; Take 1 tablet by mouth 2 times daily for 3 days  -     azithromycin (ZITHROMAX) 250 MG tablet; Take 1 tablet by mouth See Admin Instructions for 5 days 500mg on day 1 followed by 250mg on days 2 - 5    -     guaiFENesin-codeine (TUSSI-ORGANIDIN NR) 100-10 MG/5ML syrup; Take 5 mLs by mouth 2 times daily as needed for Cough for up to 3 days. Max Daily Amount: 10 mLs        No orders of the defined types were placed in this encounter. New Prescriptions    AZITHROMYCIN (ZITHROMAX) 250 MG TABLET    Take 1 tablet by mouth See Admin Instructions for 5 days 500mg on day 1 followed by 250mg on days 2 - 5    GUAIFENESIN-CODEINE (TUSSI-ORGANIDIN NR) 100-10 MG/5ML SYRUP    Take 5 mLs by mouth 2 times daily as needed for Cough for up to 3 days. Max Daily Amount: 10 mLs    PREDNISONE (DELTASONE) 10 MG TABLET    Take 1 tablet by mouth 2 times daily for 3 days         No follow-ups on file. There are no Patient Instructions on file for this visit. EMR Dragon/transcription disclaimer:Significant part of this  encounter note is electronic transcription/translationof spoken language to printed text.  The electronic translation of spoken language may be erroneous, or at times, nonsensical words or phrases may be inadvertently transcribed.  Although I have reviewed the note for sucherrors, some may still exist.

## 2023-04-20 NOTE — TELEPHONE ENCOUNTER
Jose Malone called to request a refill on her medication.       Last office visit : 2/27/2023   Next office visit : 10/24/2023    Requested Prescriptions     Pending Prescriptions Disp Refills    amLODIPine (NORVASC) 2.5 MG tablet [Pharmacy Med Name: amLODIPine Besylate 2.5 MG Oral Tablet] 90 tablet 0     Sig: Take 1 tablet by mouth once daily            Shila Le MA

## 2023-04-21 RX ORDER — AMLODIPINE BESYLATE 2.5 MG/1
TABLET ORAL
Qty: 90 TABLET | Refills: 0 | Status: SHIPPED | OUTPATIENT
Start: 2023-04-21

## 2023-07-27 ENCOUNTER — TELEPHONE (OUTPATIENT)
Dept: INTERNAL MEDICINE | Age: 43
End: 2023-07-27

## 2023-07-27 ENCOUNTER — OFFICE VISIT (OUTPATIENT)
Dept: INTERNAL MEDICINE | Age: 43
End: 2023-07-27
Payer: COMMERCIAL

## 2023-07-27 VITALS
BODY MASS INDEX: 23.74 KG/M2 | OXYGEN SATURATION: 97 % | SYSTOLIC BLOOD PRESSURE: 128 MMHG | WEIGHT: 129 LBS | RESPIRATION RATE: 18 BRPM | DIASTOLIC BLOOD PRESSURE: 80 MMHG | HEIGHT: 62 IN | HEART RATE: 78 BPM

## 2023-07-27 DIAGNOSIS — N34.2 INFECTIVE URETHRITIS: Primary | ICD-10-CM

## 2023-07-27 DIAGNOSIS — R30.0 DYSURIA: ICD-10-CM

## 2023-07-27 DIAGNOSIS — R35.0 URINARY FREQUENCY: ICD-10-CM

## 2023-07-27 PROCEDURE — 3074F SYST BP LT 130 MM HG: CPT | Performed by: INTERNAL MEDICINE

## 2023-07-27 PROCEDURE — 3079F DIAST BP 80-89 MM HG: CPT | Performed by: INTERNAL MEDICINE

## 2023-07-27 PROCEDURE — 99213 OFFICE O/P EST LOW 20 MIN: CPT | Performed by: INTERNAL MEDICINE

## 2023-07-27 RX ORDER — AMLODIPINE BESYLATE 2.5 MG/1
TABLET ORAL
Qty: 90 TABLET | Refills: 0 | Status: SHIPPED | OUTPATIENT
Start: 2023-07-27

## 2023-07-27 RX ORDER — PHENAZOPYRIDINE HYDROCHLORIDE 200 MG/1
200 TABLET, FILM COATED ORAL 3 TIMES DAILY PRN
Qty: 6 TABLET | Refills: 0 | Status: SHIPPED | OUTPATIENT
Start: 2023-07-27 | End: 2023-07-30

## 2023-07-27 RX ORDER — CIPROFLOXACIN 250 MG/1
250 TABLET, FILM COATED ORAL 2 TIMES DAILY
Qty: 10 TABLET | Refills: 0 | Status: SHIPPED | OUTPATIENT
Start: 2023-07-27 | End: 2023-08-01

## 2023-07-27 RX ORDER — METOPROLOL SUCCINATE 25 MG/1
25 TABLET, EXTENDED RELEASE ORAL DAILY
Qty: 90 TABLET | Refills: 1 | Status: SHIPPED | OUTPATIENT
Start: 2023-07-27

## 2023-07-27 ASSESSMENT — ENCOUNTER SYMPTOMS
COUGH: 0
CONSTIPATION: 0
SORE THROAT: 0
WHEEZING: 0
CHEST TIGHTNESS: 0
ABDOMINAL PAIN: 0

## 2023-07-28 DIAGNOSIS — R30.0 DYSURIA: ICD-10-CM

## 2023-07-28 DIAGNOSIS — R35.0 URINARY FREQUENCY: ICD-10-CM

## 2023-07-28 LAB
BACTERIA URNS QL MICRO: ABNORMAL /HPF
BILIRUB UR QL STRIP: NEGATIVE
CLARITY UR: CLEAR
COLOR UR: YELLOW
CRYSTALS URNS MICRO: ABNORMAL /HPF
EPI CELLS #/AREA URNS AUTO: 0 /HPF (ref 0–5)
GLUCOSE UR STRIP.AUTO-MCNC: NEGATIVE MG/DL
HGB UR STRIP.AUTO-MCNC: ABNORMAL MG/L
HYALINE CASTS #/AREA URNS AUTO: 5 /HPF (ref 0–8)
KETONES UR STRIP.AUTO-MCNC: NEGATIVE MG/DL
LEUKOCYTE ESTERASE UR QL STRIP.AUTO: ABNORMAL
NITRITE UR QL STRIP.AUTO: POSITIVE
PH UR STRIP.AUTO: 6 [PH] (ref 5–8)
PROT UR STRIP.AUTO-MCNC: NEGATIVE MG/DL
RBC #/AREA URNS AUTO: 6 /HPF (ref 0–4)
SP GR UR STRIP.AUTO: 1.02 (ref 1–1.03)
UROBILINOGEN UR STRIP.AUTO-MCNC: 0.2 E.U./DL
WBC #/AREA URNS AUTO: 20 /HPF (ref 0–5)

## 2023-07-30 LAB
BACTERIA UR CULT: ABNORMAL
ORGANISM: ABNORMAL
ORGANISM: ABNORMAL

## 2023-08-31 LAB
CHOLEST SERPL-MCNC: 200 MG/DL (ref 160–199)
GLUCOSE SERPL-MCNC: 103 MG/DL (ref 74–109)
HDLC SERPL-MCNC: 74 MG/DL (ref 65–121)
LDLC SERPL CALC-MCNC: 117 MG/DL
TRIGL SERPL-MCNC: 47 MG/DL (ref 0–149)

## 2023-10-09 ENCOUNTER — OFFICE VISIT (OUTPATIENT)
Dept: INTERNAL MEDICINE | Age: 43
End: 2023-10-09
Payer: COMMERCIAL

## 2023-10-09 VITALS
HEART RATE: 76 BPM | OXYGEN SATURATION: 99 % | DIASTOLIC BLOOD PRESSURE: 78 MMHG | SYSTOLIC BLOOD PRESSURE: 120 MMHG | WEIGHT: 130.6 LBS | HEIGHT: 62 IN | BODY MASS INDEX: 24.03 KG/M2

## 2023-10-09 DIAGNOSIS — J01.00 ACUTE NON-RECURRENT MAXILLARY SINUSITIS: Primary | ICD-10-CM

## 2023-10-09 DIAGNOSIS — J02.9 SORE THROAT: ICD-10-CM

## 2023-10-09 DIAGNOSIS — J34.89 SINUS PRESSURE: ICD-10-CM

## 2023-10-09 DIAGNOSIS — R53.83 OTHER FATIGUE: ICD-10-CM

## 2023-10-09 LAB — S PYO AG THROAT QL: NORMAL

## 2023-10-09 PROCEDURE — 3078F DIAST BP <80 MM HG: CPT | Performed by: INTERNAL MEDICINE

## 2023-10-09 PROCEDURE — 3074F SYST BP LT 130 MM HG: CPT | Performed by: INTERNAL MEDICINE

## 2023-10-09 PROCEDURE — 96372 THER/PROPH/DIAG INJ SC/IM: CPT | Performed by: INTERNAL MEDICINE

## 2023-10-09 PROCEDURE — 99213 OFFICE O/P EST LOW 20 MIN: CPT | Performed by: INTERNAL MEDICINE

## 2023-10-09 RX ORDER — CEFDINIR 300 MG/1
300 CAPSULE ORAL 2 TIMES DAILY
Qty: 14 CAPSULE | Refills: 0 | Status: SHIPPED | OUTPATIENT
Start: 2023-10-09 | End: 2023-10-16

## 2023-10-09 RX ORDER — METHYLPREDNISOLONE ACETATE 80 MG/ML
80 INJECTION, SUSPENSION INTRA-ARTICULAR; INTRALESIONAL; INTRAMUSCULAR; SOFT TISSUE ONCE
Status: COMPLETED | OUTPATIENT
Start: 2023-10-09 | End: 2023-10-09

## 2023-10-09 RX ADMIN — METHYLPREDNISOLONE ACETATE 80 MG: 80 INJECTION, SUSPENSION INTRA-ARTICULAR; INTRALESIONAL; INTRAMUSCULAR; SOFT TISSUE at 10:36

## 2023-10-09 ASSESSMENT — ENCOUNTER SYMPTOMS
COUGH: 0
SORE THROAT: 1
CHEST TIGHTNESS: 0
SINUS PAIN: 1
SINUS PRESSURE: 1
ABDOMINAL PAIN: 0
CONSTIPATION: 0
WHEEZING: 0
RHINORRHEA: 1

## 2023-10-09 NOTE — PROGRESS NOTES
Chief Complaint   Patient presents with    Fatigue    Pharyngitis     Last Wed. Sinus Problem     Drainage   Covid test-took one yesterday and was neg. History of presenting illness:  Sam Esteban is a41 y.o. female who presents today for follow up on her chronic medical conditions as noted below. Patient Active Problem List    Diagnosis Date Noted    Acute non-recurrent pansinusitis 03/04/2021    Essential hypertension 12/21/2017    Generalized anxiety disorder 08/23/2017    Gastroesophageal reflux disease without esophagitis 08/23/2017    Fibrocystic breast 03/15/2013    Cervical dysplasia, mild 02/27/2012     Past Medical History:   Diagnosis Date    Essential hypertension 12/21/2017    Gastroesophageal reflux disease without esophagitis 8/23/2017    Generalized anxiety disorder       Past Surgical History:   Procedure Laterality Date    COLONOSCOPY N/A 01/19/2023    Dr Trisha Park, 5 yr recall    MOUTH SURGERY      teeth removed under anesthesia     Current Outpatient Medications   Medication Sig Dispense Refill    cefdinir (OMNICEF) 300 MG capsule Take 1 capsule by mouth 2 times daily for 7 days 14 capsule 0    amLODIPine (NORVASC) 2.5 MG tablet Take 1 tablet by mouth once daily 90 tablet 0    metoprolol succinate (TOPROL XL) 25 MG extended release tablet Take 1 tablet by mouth daily 90 tablet 1    busPIRone (BUSPAR) 10 MG tablet Take 1 tablet by mouth twice daily as needed for anxiety 60 tablet 4    ondansetron (ZOFRAN) 4 MG tablet Take one tablet 1 hour prior to starting bowel prep. If nausea occurs take a second tablet. Take 1-2 tablets every 6 hours for nausea.  15 tablet 0    fluticasone (FLONASE) 50 MCG/ACT nasal spray 2 sprays by Each Nostril route daily 1 Bottle 0    norgestimate-ethinyl estradiol (ORTHO-CYCLEN) 0.25-35 MG-MCG per tablet TAKE 1 TABLET BY MOUTH EVERY DAY (Patient not taking: Reported on 10/9/2023) 28 tablet 5     No current facility-administered medications for this

## 2023-10-24 ENCOUNTER — TELEPHONE (OUTPATIENT)
Dept: INTERNAL MEDICINE | Age: 43
End: 2023-10-24

## 2023-10-24 ENCOUNTER — OFFICE VISIT (OUTPATIENT)
Dept: INTERNAL MEDICINE | Age: 43
End: 2023-10-24
Payer: COMMERCIAL

## 2023-10-24 VITALS
OXYGEN SATURATION: 99 % | DIASTOLIC BLOOD PRESSURE: 78 MMHG | HEIGHT: 62 IN | SYSTOLIC BLOOD PRESSURE: 118 MMHG | BODY MASS INDEX: 23.45 KG/M2 | HEART RATE: 83 BPM | WEIGHT: 127.4 LBS

## 2023-10-24 DIAGNOSIS — Z00.00 ANNUAL PHYSICAL EXAM: Primary | ICD-10-CM

## 2023-10-24 DIAGNOSIS — F41.1 GENERALIZED ANXIETY DISORDER: ICD-10-CM

## 2023-10-24 DIAGNOSIS — I10 ESSENTIAL HYPERTENSION: ICD-10-CM

## 2023-10-24 DIAGNOSIS — E55.9 VITAMIN D DEFICIENCY: ICD-10-CM

## 2023-10-24 DIAGNOSIS — Z12.31 ENCOUNTER FOR SCREENING MAMMOGRAM FOR MALIGNANT NEOPLASM OF BREAST: ICD-10-CM

## 2023-10-24 PROCEDURE — 99396 PREV VISIT EST AGE 40-64: CPT | Performed by: INTERNAL MEDICINE

## 2023-10-24 PROCEDURE — 3078F DIAST BP <80 MM HG: CPT | Performed by: INTERNAL MEDICINE

## 2023-10-24 PROCEDURE — 3074F SYST BP LT 130 MM HG: CPT | Performed by: INTERNAL MEDICINE

## 2023-10-24 RX ORDER — ONDANSETRON 4 MG/1
TABLET, FILM COATED ORAL
Qty: 15 TABLET | Refills: 0 | Status: SHIPPED | OUTPATIENT
Start: 2023-10-24

## 2023-10-24 RX ORDER — METOPROLOL SUCCINATE 25 MG/1
25 TABLET, EXTENDED RELEASE ORAL DAILY
Qty: 90 TABLET | Refills: 1 | Status: SHIPPED | OUTPATIENT
Start: 2023-10-24

## 2023-10-24 RX ORDER — AMLODIPINE BESYLATE 5 MG/1
TABLET ORAL
Qty: 90 TABLET | Refills: 1 | Status: SHIPPED | OUTPATIENT
Start: 2023-10-24

## 2023-10-24 RX ORDER — AMLODIPINE BESYLATE 2.5 MG/1
TABLET ORAL
Qty: 90 TABLET | Refills: 0 | Status: SHIPPED | OUTPATIENT
Start: 2023-10-24 | End: 2023-10-24

## 2023-10-24 ASSESSMENT — ENCOUNTER SYMPTOMS
CONSTIPATION: 0
WHEEZING: 0
SORE THROAT: 0
COUGH: 0
CHEST TIGHTNESS: 0
ABDOMINAL PAIN: 0

## 2023-10-24 NOTE — TELEPHONE ENCOUNTER
Closed  PA Detail   Other Case ID: [id number]      Payer:  [employer] [employer] and [facility] of [address]   This request cannot be processed due to the medication is not covered by the plan. Prior auth initiated by:  [address] [place] [address]    [phone]    View History       Amlodipine is not covered under her plan

## 2023-10-24 NOTE — TELEPHONE ENCOUNTER
Joyce Exon called to request a refill on her medication.       Last office visit : 10/24/2023   Next office visit : Visit date not found     Requested Prescriptions     Pending Prescriptions Disp Refills    amLODIPine (NORVASC) 5 MG tablet 90 tablet 1     Sig: TAKE 0.5 DAILY            April Bertha [place], [place]

## 2023-10-24 NOTE — PROGRESS NOTES
Chief Complaint:   Evelyn Calhoun is a 39 y.o. female who presents forcomplete physical exam.    History of Present Illness:      Evelyn Calhoun is a 39 y.o. female who presents todayfor wellness visit AND follow up on her chronic medical conditions as noted below. Patient Active Problem List    Diagnosis Date Noted    Acute non-recurrent pansinusitis 03/04/2021    Essential hypertension 12/21/2017    Generalized anxiety disorder 08/23/2017    Gastroesophageal reflux disease without esophagitis 08/23/2017    Fibrocystic breast 03/15/2013    Cervical dysplasia, mild 02/27/2012       Past Medical History:   Diagnosis Date    Essential hypertension 12/21/2017    Gastroesophageal reflux disease without esophagitis 8/23/2017    Generalized anxiety disorder        Past Surgical History:   Procedure Laterality Date    COLONOSCOPY N/A 01/19/2023    Dr Emery Escamilla, 5 yr recall    MOUTH SURGERY      teeth removed under anesthesia       Current Outpatient Medications   Medication Sig Dispense Refill    ondansetron (ZOFRAN) 4 MG tablet Take one tablet 1 hour prior to starting bowel prep. If nausea occurs take a second tablet. Take 1-2 tablets every 6 hours for nausea. 15 tablet 0    metoprolol succinate (TOPROL XL) 25 MG extended release tablet Take 1 tablet by mouth daily 90 tablet 1    amLODIPine (NORVASC) 2.5 MG tablet Take 1 tablet by mouth once daily 90 tablet 0    busPIRone (BUSPAR) 10 MG tablet Take 1 tablet by mouth twice daily as needed for anxiety 60 tablet 4    fluticasone (FLONASE) 50 MCG/ACT nasal spray 2 sprays by Each Nostril route daily 1 Bottle 0    norgestimate-ethinyl estradiol (ORTHO-CYCLEN) 0.25-35 MG-MCG per tablet TAKE 1 TABLET BY MOUTH EVERY DAY (Patient not taking: Reported on 10/24/2023) 28 tablet 5     No current facility-administered medications for this visit.      No Known Allergies    Social History     Socioeconomic History    Marital status:      Spouse name: None    Number of

## 2023-11-01 ENCOUNTER — OFFICE VISIT (OUTPATIENT)
Dept: INTERNAL MEDICINE | Age: 43
End: 2023-11-01
Payer: COMMERCIAL

## 2023-11-01 VITALS
DIASTOLIC BLOOD PRESSURE: 70 MMHG | SYSTOLIC BLOOD PRESSURE: 120 MMHG | OXYGEN SATURATION: 98 % | HEART RATE: 85 BPM | WEIGHT: 127 LBS | TEMPERATURE: 98.5 F | BODY MASS INDEX: 23.37 KG/M2 | HEIGHT: 62 IN

## 2023-11-01 DIAGNOSIS — R09.81 SINUS CONGESTION: ICD-10-CM

## 2023-11-01 DIAGNOSIS — R05.1 ACUTE COUGH: ICD-10-CM

## 2023-11-01 DIAGNOSIS — J20.8 ACUTE BRONCHITIS DUE TO COVID-19 VIRUS: Primary | ICD-10-CM

## 2023-11-01 DIAGNOSIS — J34.89 SINUS PRESSURE: ICD-10-CM

## 2023-11-01 DIAGNOSIS — U07.1 ACUTE BRONCHITIS DUE TO COVID-19 VIRUS: Primary | ICD-10-CM

## 2023-11-01 LAB
INFLUENZA A ANTIGEN, POC: NEGATIVE
INFLUENZA B ANTIGEN, POC: NEGATIVE
LOT EXPIRE DATE: ABNORMAL
LOT KIT NUMBER: ABNORMAL
SARS-COV-2, POC: DETECTED
VALID INTERNAL CONTROL: YES
VENDOR AND KIT NAME POC: ABNORMAL

## 2023-11-01 PROCEDURE — 99213 OFFICE O/P EST LOW 20 MIN: CPT | Performed by: INTERNAL MEDICINE

## 2023-11-01 PROCEDURE — 3074F SYST BP LT 130 MM HG: CPT | Performed by: INTERNAL MEDICINE

## 2023-11-01 PROCEDURE — 3078F DIAST BP <80 MM HG: CPT | Performed by: INTERNAL MEDICINE

## 2023-11-01 RX ORDER — ALBUTEROL SULFATE 90 UG/1
2 AEROSOL, METERED RESPIRATORY (INHALATION) 4 TIMES DAILY PRN
Qty: 18 G | Refills: 0 | Status: SHIPPED | OUTPATIENT
Start: 2023-11-01

## 2023-11-01 RX ORDER — PREDNISONE 10 MG/1
10 TABLET ORAL 2 TIMES DAILY
Qty: 6 TABLET | Refills: 0 | Status: SHIPPED | OUTPATIENT
Start: 2023-11-01 | End: 2023-11-04

## 2023-11-01 ASSESSMENT — ENCOUNTER SYMPTOMS
ABDOMINAL PAIN: 0
SORE THROAT: 0
SINUS PAIN: 1
CONSTIPATION: 0
SINUS PRESSURE: 1
COUGH: 1
WHEEZING: 0
CHEST TIGHTNESS: 0
RHINORRHEA: 1

## 2023-11-01 NOTE — PROGRESS NOTES
Chief Complaint   Patient presents with    Cough    Fatigue    Generalized Body Aches     Symptoms started this morning      History of presenting illness:  Evelyn Calhoun is a41 y.o. female who presents today for follow up on her chronic medical conditions as noted below. Patient Active Problem List    Diagnosis Date Noted    Acute non-recurrent pansinusitis 03/04/2021    Essential hypertension 12/21/2017    Generalized anxiety disorder 08/23/2017    Gastroesophageal reflux disease without esophagitis 08/23/2017    Fibrocystic breast 03/15/2013    Cervical dysplasia, mild 02/27/2012     Past Medical History:   Diagnosis Date    Essential hypertension 12/21/2017    Gastroesophageal reflux disease without esophagitis 8/23/2017    Generalized anxiety disorder       Past Surgical History:   Procedure Laterality Date    COLONOSCOPY N/A 01/19/2023    Dr Emery Escamilla, 5 yr recall    MOUTH SURGERY      teeth removed under anesthesia     Current Outpatient Medications   Medication Sig Dispense Refill    predniSONE (DELTASONE) 10 MG tablet Take 1 tablet by mouth 2 times daily for 3 days 6 tablet 0    albuterol sulfate HFA (VENTOLIN HFA) 108 (90 Base) MCG/ACT inhaler Inhale 2 puffs into the lungs 4 times daily as needed for Wheezing 18 g 0    ondansetron (ZOFRAN) 4 MG tablet Take one tablet 1 hour prior to starting bowel prep. If nausea occurs take a second tablet. Take 1-2 tablets every 6 hours for nausea.  15 tablet 0    metoprolol succinate (TOPROL XL) 25 MG extended release tablet Take 1 tablet by mouth daily 90 tablet 1    amLODIPine (NORVASC) 5 MG tablet TAKE 0.5 DAILY 90 tablet 1    busPIRone (BUSPAR) 10 MG tablet Take 1 tablet by mouth twice daily as needed for anxiety 60 tablet 4    fluticasone (FLONASE) 50 MCG/ACT nasal spray 2 sprays by Each Nostril route daily 1 Bottle 0    norgestimate-ethinyl estradiol (ORTHO-CYCLEN) 0.25-35 MG-MCG per tablet TAKE 1 TABLET BY MOUTH EVERY DAY (Patient not taking: Reported

## 2024-03-13 ENCOUNTER — HOSPITAL ENCOUNTER (OUTPATIENT)
Dept: WOMENS IMAGING | Age: 44
Discharge: HOME | End: 2024-03-13
Payer: COMMERCIAL

## 2024-03-13 DIAGNOSIS — Z12.31 ENCOUNTER FOR SCREENING MAMMOGRAM FOR MALIGNANT NEOPLASM OF BREAST: ICD-10-CM

## 2024-03-13 PROCEDURE — 77063 BREAST TOMOSYNTHESIS BI: CPT

## 2024-05-13 ENCOUNTER — OFFICE VISIT (OUTPATIENT)
Dept: INTERNAL MEDICINE | Age: 44
End: 2024-05-13
Payer: COMMERCIAL

## 2024-05-13 VITALS
OXYGEN SATURATION: 99 % | DIASTOLIC BLOOD PRESSURE: 68 MMHG | SYSTOLIC BLOOD PRESSURE: 102 MMHG | HEART RATE: 79 BPM | WEIGHT: 132 LBS | HEIGHT: 62 IN | BODY MASS INDEX: 24.29 KG/M2

## 2024-05-13 DIAGNOSIS — I10 ESSENTIAL HYPERTENSION: Primary | ICD-10-CM

## 2024-05-13 DIAGNOSIS — I95.2 HYPOTENSION DUE TO DRUGS: ICD-10-CM

## 2024-05-13 DIAGNOSIS — E03.8 SUBCLINICAL HYPOTHYROIDISM: ICD-10-CM

## 2024-05-13 PROCEDURE — 99213 OFFICE O/P EST LOW 20 MIN: CPT | Performed by: INTERNAL MEDICINE

## 2024-05-13 PROCEDURE — 3078F DIAST BP <80 MM HG: CPT | Performed by: INTERNAL MEDICINE

## 2024-05-13 PROCEDURE — 3074F SYST BP LT 130 MM HG: CPT | Performed by: INTERNAL MEDICINE

## 2024-05-13 RX ORDER — MINOXIDIL 2.5 MG/1
2.5 TABLET ORAL
COMMUNITY
Start: 2024-04-09

## 2024-05-13 SDOH — ECONOMIC STABILITY: FOOD INSECURITY: WITHIN THE PAST 12 MONTHS, YOU WORRIED THAT YOUR FOOD WOULD RUN OUT BEFORE YOU GOT MONEY TO BUY MORE.: NEVER TRUE

## 2024-05-13 SDOH — ECONOMIC STABILITY: HOUSING INSECURITY
IN THE LAST 12 MONTHS, WAS THERE A TIME WHEN YOU DID NOT HAVE A STEADY PLACE TO SLEEP OR SLEPT IN A SHELTER (INCLUDING NOW)?: NO

## 2024-05-13 SDOH — ECONOMIC STABILITY: FOOD INSECURITY: WITHIN THE PAST 12 MONTHS, THE FOOD YOU BOUGHT JUST DIDN'T LAST AND YOU DIDN'T HAVE MONEY TO GET MORE.: NEVER TRUE

## 2024-05-13 SDOH — ECONOMIC STABILITY: INCOME INSECURITY: HOW HARD IS IT FOR YOU TO PAY FOR THE VERY BASICS LIKE FOOD, HOUSING, MEDICAL CARE, AND HEATING?: NOT HARD AT ALL

## 2024-05-13 ASSESSMENT — PATIENT HEALTH QUESTIONNAIRE - PHQ9
SUM OF ALL RESPONSES TO PHQ QUESTIONS 1-9: 0
SUM OF ALL RESPONSES TO PHQ QUESTIONS 1-9: 0
2. FEELING DOWN, DEPRESSED OR HOPELESS: NOT AT ALL
SUM OF ALL RESPONSES TO PHQ QUESTIONS 1-9: 0
SUM OF ALL RESPONSES TO PHQ9 QUESTIONS 1 & 2: 0
SUM OF ALL RESPONSES TO PHQ QUESTIONS 1-9: 0
1. LITTLE INTEREST OR PLEASURE IN DOING THINGS: NOT AT ALL

## 2024-05-13 ASSESSMENT — ENCOUNTER SYMPTOMS
ABDOMINAL PAIN: 0
SORE THROAT: 0
CONSTIPATION: 0
WHEEZING: 0
COUGH: 0

## 2024-05-13 NOTE — PROGRESS NOTES
Chief Complaint   Patient presents with    Other     Low blood pressure   103/68 is her lowest reading     Alopecia     Noticing hair loss in the past year and not able to lose weight with diet/exercise (would like thyroid checked)     History of presenting illness:  Renetta Patrick is a43 y.o. female who presents today for follow up on her chronic medical conditions as noted below.      Patient Active Problem List    Diagnosis Date Noted    Acute non-recurrent pansinusitis 03/04/2021    Essential hypertension 12/21/2017    Generalized anxiety disorder 08/23/2017    Gastroesophageal reflux disease without esophagitis 08/23/2017    Fibrocystic breast 03/15/2013    Cervical dysplasia, mild 02/27/2012     Past Medical History:   Diagnosis Date    Essential hypertension 12/21/2017    Gastroesophageal reflux disease without esophagitis 8/23/2017    Generalized anxiety disorder       Past Surgical History:   Procedure Laterality Date    COLONOSCOPY N/A 01/19/2023    Dr LUIS Hillman-Radha, 5 yr recall    MOUTH SURGERY      teeth removed under anesthesia     Current Outpatient Medications   Medication Sig Dispense Refill    minoxidil (LONITEN) 2.5 MG tablet 1 tablet Cut into fourths      ondansetron (ZOFRAN) 4 MG tablet Take one tablet 1 hour prior to starting bowel prep. If nausea occurs take a second tablet. Take 1-2 tablets every 6 hours for nausea. 15 tablet 0    busPIRone (BUSPAR) 10 MG tablet Take 1 tablet by mouth twice daily as needed for anxiety 60 tablet 4    fluticasone (FLONASE) 50 MCG/ACT nasal spray 2 sprays by Each Nostril route daily 1 Bottle 0    albuterol sulfate HFA (VENTOLIN HFA) 108 (90 Base) MCG/ACT inhaler Inhale 2 puffs into the lungs 4 times daily as needed for Wheezing 18 g 0    metoprolol succinate (TOPROL XL) 25 MG extended release tablet Take 1 tablet by mouth daily 90 tablet 1    amLODIPine (NORVASC) 5 MG tablet TAKE 0.5 DAILY 90 tablet 1    norgestimate-ethinyl estradiol (ORTHO-CYCLEN) 0.25-35

## 2024-05-15 DIAGNOSIS — E03.8 SUBCLINICAL HYPOTHYROIDISM: ICD-10-CM

## 2024-05-15 LAB
T4 FREE SERPL-MCNC: 0.99 NG/DL (ref 0.93–1.7)
TSH SERPL DL<=0.005 MIU/L-ACNC: 2.64 UIU/ML (ref 0.27–4.2)

## 2024-06-07 ENCOUNTER — OFFICE VISIT (OUTPATIENT)
Dept: OBGYN CLINIC | Age: 44
End: 2024-06-07
Payer: COMMERCIAL

## 2024-06-07 VITALS
DIASTOLIC BLOOD PRESSURE: 82 MMHG | BODY MASS INDEX: 24.11 KG/M2 | HEIGHT: 62 IN | HEART RATE: 88 BPM | WEIGHT: 131 LBS | SYSTOLIC BLOOD PRESSURE: 122 MMHG

## 2024-06-07 DIAGNOSIS — R63.8 UNABLE TO LOSE WEIGHT: ICD-10-CM

## 2024-06-07 DIAGNOSIS — L65.9 HAIR LOSS: Primary | ICD-10-CM

## 2024-06-07 DIAGNOSIS — N95.1 PERIMENOPAUSAL: ICD-10-CM

## 2024-06-07 PROCEDURE — 3079F DIAST BP 80-89 MM HG: CPT

## 2024-06-07 PROCEDURE — 99214 OFFICE O/P EST MOD 30 MIN: CPT

## 2024-06-07 PROCEDURE — 3074F SYST BP LT 130 MM HG: CPT

## 2024-06-07 ASSESSMENT — ENCOUNTER SYMPTOMS
GASTROINTESTINAL NEGATIVE: 1
CHEST TIGHTNESS: 0
BACK PAIN: 0
SHORTNESS OF BREATH: 0
CONSTIPATION: 0
DIARRHEA: 0
RESPIRATORY NEGATIVE: 1
ABDOMINAL PAIN: 0
NAUSEA: 0
RECTAL PAIN: 0

## 2024-06-07 ASSESSMENT — ANXIETY QUESTIONNAIRES
GAD7 TOTAL SCORE: 4
IF YOU CHECKED OFF ANY PROBLEMS ON THIS QUESTIONNAIRE, HOW DIFFICULT HAVE THESE PROBLEMS MADE IT FOR YOU TO DO YOUR WORK, TAKE CARE OF THINGS AT HOME, OR GET ALONG WITH OTHER PEOPLE: NOT DIFFICULT AT ALL
5. BEING SO RESTLESS THAT IT IS HARD TO SIT STILL: NOT AT ALL
6. BECOMING EASILY ANNOYED OR IRRITABLE: SEVERAL DAYS
4. TROUBLE RELAXING: NOT AT ALL
3. WORRYING TOO MUCH ABOUT DIFFERENT THINGS: SEVERAL DAYS
2. NOT BEING ABLE TO STOP OR CONTROL WORRYING: SEVERAL DAYS
7. FEELING AFRAID AS IF SOMETHING AWFUL MIGHT HAPPEN: NOT AT ALL
1. FEELING NERVOUS, ANXIOUS, OR ON EDGE: SEVERAL DAYS

## 2024-06-07 ASSESSMENT — PATIENT HEALTH QUESTIONNAIRE - PHQ9
2. FEELING DOWN, DEPRESSED OR HOPELESS: NOT AT ALL
SUM OF ALL RESPONSES TO PHQ QUESTIONS 1-9: 0
SUM OF ALL RESPONSES TO PHQ QUESTIONS 1-9: 0
SUM OF ALL RESPONSES TO PHQ9 QUESTIONS 1 & 2: 0
1. LITTLE INTEREST OR PLEASURE IN DOING THINGS: NOT AT ALL
SUM OF ALL RESPONSES TO PHQ QUESTIONS 1-9: 0
SUM OF ALL RESPONSES TO PHQ QUESTIONS 1-9: 0

## 2024-06-07 NOTE — PROGRESS NOTES
[facility] OB/GYN  CNM Office Note    Renetta Patrick is a 43 y.o. female who presents today for her medical conditions/ complaints as noted below.  Chief Complaint   Patient presents with    Other     HPI  Renetta Patrick presents today with complaints of hair loss and inability to lose weight. She also wonders about her fertility.     Problems/Complaints today:  1. Hair loss  2. Perimenopausal  -     Progesterone; Future  -     Estradiol; Future  -     Testosterone Free and Total, Non-Male; Future  -     Follicle Stimulating Hormone; Future  -     Luteinizing Hormone; Future  -     Anti Mullerian Hormone; Future  3. Unable to lose weight       Patient Active Problem List   Diagnosis    Cervical dysplasia, mild    Fibrocystic breast    Generalized anxiety disorder    Gastroesophageal reflux disease without esophagitis    Essential hypertension    Acute non-recurrent pansinusitis       No LMP recorded.      Past Medical History:   Diagnosis Date    Essential hypertension 2017    Gastroesophageal reflux disease without esophagitis 2017    Generalized anxiety disorder      Past Surgical History:   Procedure Laterality Date    COLONOSCOPY N/A 2023    Dr LUIS Hillman-Radha, 5 yr recall    MOUTH SURGERY      teeth removed under anesthesia     Family History   Problem Relation Age of Onset    Colon Cancer Father 50     Social History     Tobacco Use    Smoking status: Former    Smokeless tobacco: Never   Substance Use Topics    Alcohol use: Yes       Current Outpatient Medications   Medication Sig Dispense Refill    minoxidil (LONITEN) 2.5 MG tablet 1 tablet Cut into fourths      ondansetron (ZOFRAN) 4 MG tablet Take one tablet 1 hour prior to starting bowel prep. If nausea occurs take a second tablet. Take 1-2 tablets every 6 hours for nausea. 15 tablet 0    metoprolol succinate (TOPROL XL) 25 MG extended release tablet Take 1 tablet by mouth daily 90 tablet 1    fluticasone

## 2024-06-07 NOTE — PROGRESS NOTES
Pt presents today for pap smear and breast exam.  She also complains of alexander rloss terribly, has worked out consistently for the last several weeks and feel like she can't lose weight. Would like to have hormones checked      Last mammogram:  3/13/24  Last pap smear:  10/21/2021  Contraception:  none  :  1  Para:  1  AB:    Last bone density:  none  Last colonoscopy:         Sees Dr Perrin.     Metoprolol 2 mg, she only takes 1/2.

## 2024-06-11 DIAGNOSIS — N95.1 PERIMENOPAUSAL: ICD-10-CM

## 2024-06-11 LAB
ESTRADIOL SERPL-SCNC: 185.6 PG/ML
FSH SERPL-SCNC: 4.6 MIU/ML
LH SERPL-ACNC: 3 MIU/ML
PROGEST SERPL-MCNC: 24.66 NG/ML

## 2024-06-14 LAB — MIS SERPL-MCNC: 0.85 NG/ML

## 2024-06-17 LAB
SHBG SERPL-SCNC: 124 NMOL/L (ref 25–122)
TESTOST FREE SERPL-MCNC: 1.3 PG/ML (ref 1.1–5.8)
TESTOST SERPL-MCNC: 19 NG/DL (ref 9–55)

## 2024-07-09 ENCOUNTER — TELEPHONE (OUTPATIENT)
Dept: OBGYN CLINIC | Age: 44
End: 2024-07-09

## 2024-07-31 RX ORDER — METOPROLOL SUCCINATE 25 MG/1
25 TABLET, EXTENDED RELEASE ORAL DAILY
Qty: 90 TABLET | Refills: 1 | Status: SHIPPED | OUTPATIENT
Start: 2024-07-31

## 2024-07-31 NOTE — TELEPHONE ENCOUNTER
Renetta Ruiz Celina called to request a refill on her medication.      Last office visit : 5/13/2024   Next office visit : 10/22/2024     Requested Prescriptions     Pending Prescriptions Disp Refills    metoprolol succinate (TOPROL XL) 25 MG extended release tablet [Pharmacy Med Name: Metoprolol Succinate ER 25 MG Oral Tablet Extended Release 24 Hour] 90 tablet 1     Sig: Take 1 tablet by mouth once daily            Octavia Mckinney MA

## 2024-08-16 ENCOUNTER — OFFICE VISIT (OUTPATIENT)
Dept: INTERNAL MEDICINE | Age: 44
End: 2024-08-16
Payer: COMMERCIAL

## 2024-08-16 VITALS
WEIGHT: 129.6 LBS | OXYGEN SATURATION: 98 % | HEART RATE: 98 BPM | SYSTOLIC BLOOD PRESSURE: 110 MMHG | HEIGHT: 62 IN | BODY MASS INDEX: 23.85 KG/M2 | DIASTOLIC BLOOD PRESSURE: 62 MMHG

## 2024-08-16 DIAGNOSIS — J21.9 ACUTE BRONCHITIS AND BRONCHIOLITIS: ICD-10-CM

## 2024-08-16 DIAGNOSIS — R05.2 SUBACUTE COUGH: ICD-10-CM

## 2024-08-16 DIAGNOSIS — M25.552 GREATER TROCHANTERIC PAIN SYNDROME OF LEFT LOWER EXTREMITY: Primary | ICD-10-CM

## 2024-08-16 DIAGNOSIS — J20.9 ACUTE BRONCHITIS AND BRONCHIOLITIS: ICD-10-CM

## 2024-08-16 DIAGNOSIS — M25.552 LATERAL PAIN OF LEFT HIP: ICD-10-CM

## 2024-08-16 PROCEDURE — 3074F SYST BP LT 130 MM HG: CPT | Performed by: INTERNAL MEDICINE

## 2024-08-16 PROCEDURE — 99214 OFFICE O/P EST MOD 30 MIN: CPT | Performed by: INTERNAL MEDICINE

## 2024-08-16 PROCEDURE — 3078F DIAST BP <80 MM HG: CPT | Performed by: INTERNAL MEDICINE

## 2024-08-16 RX ORDER — PREDNISONE 10 MG/1
10 TABLET ORAL 2 TIMES DAILY
Qty: 8 TABLET | Refills: 0 | Status: SHIPPED | OUTPATIENT
Start: 2024-08-16 | End: 2024-08-20

## 2024-08-16 RX ORDER — MELOXICAM 15 MG/1
15 TABLET ORAL DAILY PRN
Qty: 20 TABLET | Refills: 0 | Status: SHIPPED | OUTPATIENT
Start: 2024-08-16 | End: 2024-09-05

## 2024-08-16 ASSESSMENT — ENCOUNTER SYMPTOMS
WHEEZING: 0
ABDOMINAL PAIN: 0
SORE THROAT: 0
CONSTIPATION: 0
CHEST TIGHTNESS: 0
COUGH: 1

## 2024-08-16 NOTE — PROGRESS NOTES
Chief Complaint   Patient presents with    Other     Left hip pain times one month     Cough     With drainage times one week      History of presenting illness:  Renetta Patrick is a44 y.o. female who presents today for follow up on her chronic medical conditions as noted below.    Patient Active Problem List    Diagnosis Date Noted    Acute non-recurrent pansinusitis 03/04/2021    Essential hypertension 12/21/2017    Generalized anxiety disorder 08/23/2017    Gastroesophageal reflux disease without esophagitis 08/23/2017    Fibrocystic breast 03/15/2013    Cervical dysplasia, mild 02/27/2012     Past Medical History:   Diagnosis Date    Essential hypertension 12/21/2017    Gastroesophageal reflux disease without esophagitis 8/23/2017    Generalized anxiety disorder       Past Surgical History:   Procedure Laterality Date    COLONOSCOPY N/A 01/19/2023    Dr LUIS Hillman-Radha, 5 yr recall    MOUTH SURGERY      teeth removed under anesthesia     Current Outpatient Medications   Medication Sig Dispense Refill    predniSONE (DELTASONE) 10 MG tablet Take 1 tablet by mouth 2 times daily for 4 days 8 tablet 0    meloxicam (MOBIC) 15 MG tablet Take 1 tablet by mouth daily as needed for Pain 20 tablet 0    metoprolol succinate (TOPROL XL) 25 MG extended release tablet Take 1 tablet by mouth once daily (Patient taking differently: Take 1 tablet by mouth daily Half daily) 90 tablet 1    minoxidil (LONITEN) 2.5 MG tablet 1 tablet Cut into fourths      ondansetron (ZOFRAN) 4 MG tablet Take one tablet 1 hour prior to starting bowel prep. If nausea occurs take a second tablet. Take 1-2 tablets every 6 hours for nausea. 15 tablet 0    fluticasone (FLONASE) 50 MCG/ACT nasal spray 2 sprays by Each Nostril route daily 1 Bottle 0     No current facility-administered medications for this visit.     No Known Allergies  Social History     Tobacco Use    Smoking status: Former    Smokeless tobacco: Never   Substance Use Topics

## 2024-08-23 ENCOUNTER — TELEPHONE (OUTPATIENT)
Dept: OBGYN CLINIC | Age: 44
End: 2024-08-23

## 2024-08-23 NOTE — TELEPHONE ENCOUNTER
Patient reaching out regarding hormone labs. Previously replied to Marilyn's question, she has not been on birth control in over 1 year.

## 2024-09-17 LAB
CHOLEST SERPL-MCNC: 209 MG/DL (ref 0–199)
GLUCOSE SERPL-MCNC: 113 MG/DL (ref 70–99)
HDLC SERPL-MCNC: 69 MG/DL (ref 40–60)
LDLC SERPL CALC-MCNC: 125 MG/DL
TRIGL SERPL-MCNC: 74 MG/DL (ref 0–149)

## 2024-09-24 ENCOUNTER — TELEPHONE (OUTPATIENT)
Dept: OBGYN CLINIC | Age: 44
End: 2024-09-24

## 2024-10-30 DIAGNOSIS — I10 ESSENTIAL HYPERTENSION: ICD-10-CM

## 2024-10-30 DIAGNOSIS — F41.1 GENERALIZED ANXIETY DISORDER: ICD-10-CM

## 2024-10-30 DIAGNOSIS — Z12.31 ENCOUNTER FOR SCREENING MAMMOGRAM FOR MALIGNANT NEOPLASM OF BREAST: ICD-10-CM

## 2024-10-30 DIAGNOSIS — E55.9 VITAMIN D DEFICIENCY: ICD-10-CM

## 2024-10-30 DIAGNOSIS — Z00.00 ANNUAL PHYSICAL EXAM: ICD-10-CM

## 2024-10-30 LAB
25(OH)D3 SERPL-MCNC: 47.3 NG/ML
ALBUMIN SERPL-MCNC: 4.3 G/DL (ref 3.5–5.2)
ALP SERPL-CCNC: 68 U/L (ref 35–104)
ALT SERPL-CCNC: 21 U/L (ref 5–33)
ANION GAP SERPL CALCULATED.3IONS-SCNC: 8 MMOL/L (ref 7–19)
AST SERPL-CCNC: 17 U/L (ref 5–32)
BACTERIA URNS QL MICRO: NEGATIVE /HPF
BASOPHILS # BLD: 0.1 K/UL (ref 0–0.2)
BASOPHILS NFR BLD: 0.7 % (ref 0–1)
BILIRUB SERPL-MCNC: 0.4 MG/DL (ref 0.2–1.2)
BILIRUB UR QL STRIP: NEGATIVE
BUN SERPL-MCNC: 22 MG/DL (ref 6–20)
CALCIUM SERPL-MCNC: 8.9 MG/DL (ref 8.6–10)
CHLORIDE SERPL-SCNC: 102 MMOL/L (ref 98–111)
CHOLEST SERPL-MCNC: 219 MG/DL (ref 0–199)
CLARITY UR: CLEAR
CO2 SERPL-SCNC: 26 MMOL/L (ref 22–29)
COLOR UR: YELLOW
CREAT SERPL-MCNC: 0.8 MG/DL (ref 0.5–0.9)
CRYSTALS URNS MICRO: NORMAL /HPF
EOSINOPHIL # BLD: 0.3 K/UL (ref 0–0.6)
EOSINOPHIL NFR BLD: 3.5 % (ref 0–5)
EPI CELLS #/AREA URNS AUTO: 3 /HPF (ref 0–5)
ERYTHROCYTE [DISTWIDTH] IN BLOOD BY AUTOMATED COUNT: 12.5 % (ref 11.5–14.5)
GLUCOSE SERPL-MCNC: 115 MG/DL (ref 70–99)
GLUCOSE UR STRIP.AUTO-MCNC: NEGATIVE MG/DL
HBA1C MFR BLD: 5.2 % (ref 4–5.6)
HCT VFR BLD AUTO: 40.3 % (ref 37–47)
HDLC SERPL-MCNC: 72 MG/DL (ref 40–60)
HGB BLD-MCNC: 12.9 G/DL (ref 12–16)
HGB UR STRIP.AUTO-MCNC: ABNORMAL MG/L
HYALINE CASTS #/AREA URNS AUTO: 5 /HPF (ref 0–8)
IMM GRANULOCYTES # BLD: 0 K/UL
KETONES UR STRIP.AUTO-MCNC: ABNORMAL MG/DL
LDLC SERPL CALC-MCNC: 135 MG/DL
LEUKOCYTE ESTERASE UR QL STRIP.AUTO: ABNORMAL
LYMPHOCYTES # BLD: 2.5 K/UL (ref 1.1–4.5)
LYMPHOCYTES NFR BLD: 34.1 % (ref 20–40)
MCH RBC QN AUTO: 29.9 PG (ref 27–31)
MCHC RBC AUTO-ENTMCNC: 32 G/DL (ref 33–37)
MCV RBC AUTO: 93.5 FL (ref 81–99)
MONOCYTES # BLD: 0.6 K/UL (ref 0–0.9)
MONOCYTES NFR BLD: 8.1 % (ref 0–10)
NEUTROPHILS # BLD: 4 K/UL (ref 1.5–7.5)
NEUTS SEG NFR BLD: 53.3 % (ref 50–65)
NITRITE UR QL STRIP.AUTO: NEGATIVE
PH UR STRIP.AUTO: 5.5 [PH] (ref 5–8)
PLATELET # BLD AUTO: 282 K/UL (ref 130–400)
PMV BLD AUTO: 10.2 FL (ref 9.4–12.3)
POTASSIUM SERPL-SCNC: 4.5 MMOL/L (ref 3.5–5)
PROT SERPL-MCNC: 6.6 G/DL (ref 6.4–8.3)
PROT UR STRIP.AUTO-MCNC: NEGATIVE MG/DL
RBC # BLD AUTO: 4.31 M/UL (ref 4.2–5.4)
RBC #/AREA URNS AUTO: 3 /HPF (ref 0–4)
SODIUM SERPL-SCNC: 136 MMOL/L (ref 136–145)
SP GR UR STRIP.AUTO: 1.03 (ref 1–1.03)
TRIGL SERPL-MCNC: 61 MG/DL (ref 0–149)
TSH SERPL DL<=0.005 MIU/L-ACNC: 3.58 UIU/ML (ref 0.27–4.2)
UROBILINOGEN UR STRIP.AUTO-MCNC: 0.2 E.U./DL
WBC # BLD AUTO: 7.4 K/UL (ref 4.8–10.8)
WBC #/AREA URNS AUTO: 1 /HPF (ref 0–5)

## 2024-10-31 ENCOUNTER — OFFICE VISIT (OUTPATIENT)
Dept: INTERNAL MEDICINE | Age: 44
End: 2024-10-31
Payer: COMMERCIAL

## 2024-10-31 VITALS
DIASTOLIC BLOOD PRESSURE: 68 MMHG | OXYGEN SATURATION: 97 % | HEIGHT: 62 IN | WEIGHT: 133.2 LBS | HEART RATE: 90 BPM | BODY MASS INDEX: 24.51 KG/M2 | SYSTOLIC BLOOD PRESSURE: 110 MMHG

## 2024-10-31 DIAGNOSIS — E03.8 SUBCLINICAL HYPOTHYROIDISM: ICD-10-CM

## 2024-10-31 DIAGNOSIS — Z12.4 ENCOUNTER FOR PAPANICOLAOU SMEAR FOR CERVICAL CANCER SCREENING: ICD-10-CM

## 2024-10-31 DIAGNOSIS — E55.9 VITAMIN D DEFICIENCY: ICD-10-CM

## 2024-10-31 DIAGNOSIS — Z00.00 ANNUAL PHYSICAL EXAM: Primary | ICD-10-CM

## 2024-10-31 DIAGNOSIS — Z12.31 ENCOUNTER FOR SCREENING MAMMOGRAM FOR MALIGNANT NEOPLASM OF BREAST: ICD-10-CM

## 2024-10-31 DIAGNOSIS — F41.1 GENERALIZED ANXIETY DISORDER: ICD-10-CM

## 2024-10-31 DIAGNOSIS — I10 ESSENTIAL HYPERTENSION: ICD-10-CM

## 2024-10-31 PROCEDURE — 3074F SYST BP LT 130 MM HG: CPT | Performed by: INTERNAL MEDICINE

## 2024-10-31 PROCEDURE — 3078F DIAST BP <80 MM HG: CPT | Performed by: INTERNAL MEDICINE

## 2024-10-31 PROCEDURE — 99396 PREV VISIT EST AGE 40-64: CPT | Performed by: INTERNAL MEDICINE

## 2024-10-31 ASSESSMENT — ENCOUNTER SYMPTOMS
CHEST TIGHTNESS: 0
CONSTIPATION: 0
SORE THROAT: 0
COUGH: 0
WHEEZING: 0
ABDOMINAL PAIN: 0

## 2024-10-31 NOTE — PROGRESS NOTES
Chief Complaint:   Renetta Patrick is a 44 y.o. female who presents forcomplete physical exam.    History of Present Illness:      Renetta Patrick is a 44 y.o. female who presents todayfor wellness visit AND follow up on her chronic medical conditions as noted below.    Patient Active Problem List    Diagnosis Date Noted    Acute non-recurrent pansinusitis 03/04/2021    Essential hypertension 12/21/2017    Generalized anxiety disorder 08/23/2017    Gastroesophageal reflux disease without esophagitis 08/23/2017    Fibrocystic breast 03/15/2013    Cervical dysplasia, mild 02/27/2012       Past Medical History:   Diagnosis Date    Essential hypertension 12/21/2017    Gastroesophageal reflux disease without esophagitis 8/23/2017    Generalized anxiety disorder        Past Surgical History:   Procedure Laterality Date    COLONOSCOPY N/A 01/19/2023    Dr LUIS Hillman-Radha, 5 yr recall    MOUTH SURGERY      teeth removed under anesthesia       Current Outpatient Medications   Medication Sig Dispense Refill    metoprolol succinate (TOPROL XL) 25 MG extended release tablet Take 1 tablet by mouth once daily (Patient taking differently: Take 1 tablet by mouth daily Half daily) 90 tablet 1    minoxidil (LONITEN) 2.5 MG tablet 1 tablet Cut into fourths      ondansetron (ZOFRAN) 4 MG tablet Take one tablet 1 hour prior to starting bowel prep. If nausea occurs take a second tablet. Take 1-2 tablets every 6 hours for nausea. 15 tablet 0    fluticasone (FLONASE) 50 MCG/ACT nasal spray 2 sprays by Each Nostril route daily 1 Bottle 0     No current facility-administered medications for this visit.     No Known Allergies    Social History     Socioeconomic History    Marital status:      Spouse name: None    Number of children: None    Years of education: None    Highest education level: None   Occupational History    Occupation: ultrasonographer   Tobacco Use    Smoking status: Former    Smokeless tobacco: Never   Vaping

## 2025-01-21 ENCOUNTER — OFFICE VISIT (OUTPATIENT)
Dept: INTERNAL MEDICINE | Age: 45
End: 2025-01-21
Payer: COMMERCIAL

## 2025-01-21 VITALS
OXYGEN SATURATION: 99 % | BODY MASS INDEX: 24.33 KG/M2 | DIASTOLIC BLOOD PRESSURE: 68 MMHG | TEMPERATURE: 98.5 F | WEIGHT: 132.2 LBS | HEART RATE: 79 BPM | SYSTOLIC BLOOD PRESSURE: 116 MMHG | HEIGHT: 62 IN

## 2025-01-21 DIAGNOSIS — R68.83 CHILLS: ICD-10-CM

## 2025-01-21 DIAGNOSIS — R09.81 HEAD CONGESTION: ICD-10-CM

## 2025-01-21 DIAGNOSIS — R05.1 ACUTE COUGH: ICD-10-CM

## 2025-01-21 DIAGNOSIS — J10.1 INFLUENZA A: Primary | ICD-10-CM

## 2025-01-21 LAB
INFLUENZA A ANTIGEN, POC: POSITIVE
INFLUENZA B ANTIGEN, POC: NEGATIVE
LOT EXPIRE DATE: ABNORMAL
LOT KIT NUMBER: ABNORMAL
SARS-COV-2, POC: ABNORMAL
VALID INTERNAL CONTROL: ABNORMAL
VENDOR AND KIT NAME POC: ABNORMAL

## 2025-01-21 PROCEDURE — 3074F SYST BP LT 130 MM HG: CPT | Performed by: INTERNAL MEDICINE

## 2025-01-21 PROCEDURE — 3078F DIAST BP <80 MM HG: CPT | Performed by: INTERNAL MEDICINE

## 2025-01-21 PROCEDURE — 87428 SARSCOV & INF VIR A&B AG IA: CPT | Performed by: INTERNAL MEDICINE

## 2025-01-21 PROCEDURE — 99213 OFFICE O/P EST LOW 20 MIN: CPT | Performed by: INTERNAL MEDICINE

## 2025-01-21 RX ORDER — OSELTAMIVIR PHOSPHATE 75 MG/1
75 CAPSULE ORAL 2 TIMES DAILY
Qty: 10 CAPSULE | Refills: 0 | Status: SHIPPED | OUTPATIENT
Start: 2025-01-21 | End: 2025-01-26

## 2025-01-21 SDOH — ECONOMIC STABILITY: FOOD INSECURITY: WITHIN THE PAST 12 MONTHS, THE FOOD YOU BOUGHT JUST DIDN'T LAST AND YOU DIDN'T HAVE MONEY TO GET MORE.: NEVER TRUE

## 2025-01-21 SDOH — ECONOMIC STABILITY: FOOD INSECURITY: WITHIN THE PAST 12 MONTHS, YOU WORRIED THAT YOUR FOOD WOULD RUN OUT BEFORE YOU GOT MONEY TO BUY MORE.: NEVER TRUE

## 2025-01-21 ASSESSMENT — ENCOUNTER SYMPTOMS
RHINORRHEA: 1
WHEEZING: 0
ABDOMINAL PAIN: 0
COUGH: 1
CHEST TIGHTNESS: 0
SINUS PAIN: 1
SORE THROAT: 0
CONSTIPATION: 0
SINUS PRESSURE: 1

## 2025-01-21 ASSESSMENT — PATIENT HEALTH QUESTIONNAIRE - PHQ9
SUM OF ALL RESPONSES TO PHQ QUESTIONS 1-9: 0
1. LITTLE INTEREST OR PLEASURE IN DOING THINGS: NOT AT ALL
2. FEELING DOWN, DEPRESSED OR HOPELESS: NOT AT ALL
SUM OF ALL RESPONSES TO PHQ QUESTIONS 1-9: 0
SUM OF ALL RESPONSES TO PHQ9 QUESTIONS 1 & 2: 0

## 2025-01-21 NOTE — PROGRESS NOTES
Chief Complaint   Patient presents with    Cough    Congestion     Head congestion started yesterday     Fever    Generalized Body Aches     Son has the flu      History of presenting illness:  Renetta Patrick is a44 y.o. female who presents today for follow up on her chronic medical conditions as noted below.    Patient Active Problem List    Diagnosis Date Noted    Acute non-recurrent pansinusitis 03/04/2021    Essential hypertension 12/21/2017    Generalized anxiety disorder 08/23/2017    Gastroesophageal reflux disease without esophagitis 08/23/2017    Fibrocystic breast 03/15/2013    Cervical dysplasia, mild 02/27/2012     Past Medical History:   Diagnosis Date    Essential hypertension 12/21/2017    Gastroesophageal reflux disease without esophagitis 8/23/2017    Generalized anxiety disorder       Past Surgical History:   Procedure Laterality Date    COLONOSCOPY N/A 01/19/2023    Dr LUIS Hillman-Radha, 5 yr recall    MOUTH SURGERY      teeth removed under anesthesia     Current Outpatient Medications   Medication Sig Dispense Refill    oseltamivir (TAMIFLU) 75 MG capsule Take 1 capsule by mouth 2 times daily for 5 days 10 capsule 0    metoprolol succinate (TOPROL XL) 25 MG extended release tablet Take 1 tablet by mouth once daily (Patient taking differently: Take 1 tablet by mouth daily Half daily) 90 tablet 1    minoxidil (LONITEN) 2.5 MG tablet 1 tablet Cut into thirds      ondansetron (ZOFRAN) 4 MG tablet Take one tablet 1 hour prior to starting bowel prep. If nausea occurs take a second tablet. Take 1-2 tablets every 6 hours for nausea. 15 tablet 0     No current facility-administered medications for this visit.     No Known Allergies  Social History     Tobacco Use    Smoking status: Former    Smokeless tobacco: Never   Substance Use Topics    Alcohol use: Yes      Family History   Problem Relation Age of Onset    Colon Cancer Father 50       Review of Systems   Constitutional:  Positive for chills

## 2025-01-23 ENCOUNTER — OFFICE VISIT (OUTPATIENT)
Dept: INTERNAL MEDICINE | Age: 45
End: 2025-01-23

## 2025-01-23 VITALS
HEIGHT: 62 IN | TEMPERATURE: 100.7 F | HEART RATE: 110 BPM | SYSTOLIC BLOOD PRESSURE: 110 MMHG | WEIGHT: 132 LBS | DIASTOLIC BLOOD PRESSURE: 66 MMHG | BODY MASS INDEX: 24.29 KG/M2 | OXYGEN SATURATION: 99 %

## 2025-01-23 DIAGNOSIS — R50.9 FEVER AND CHILLS: ICD-10-CM

## 2025-01-23 DIAGNOSIS — J34.89 SINUS PRESSURE: ICD-10-CM

## 2025-01-23 DIAGNOSIS — R05.1 ACUTE COUGH: ICD-10-CM

## 2025-01-23 DIAGNOSIS — J10.1 INFLUENZA A: Primary | ICD-10-CM

## 2025-01-23 DIAGNOSIS — R52 BODY ACHES: ICD-10-CM

## 2025-01-23 DIAGNOSIS — R09.81 HEAD CONGESTION: ICD-10-CM

## 2025-01-23 RX ORDER — PREDNISONE 10 MG/1
10 TABLET ORAL DAILY
Qty: 4 TABLET | Refills: 0 | Status: SHIPPED | OUTPATIENT
Start: 2025-01-23 | End: 2025-01-27

## 2025-01-23 RX ORDER — CODEINE PHOSPHATE AND GUAIFENESIN 10; 100 MG/5ML; MG/5ML
5 SOLUTION ORAL NIGHTLY PRN
Qty: 80 ML | Refills: 0 | Status: SHIPPED | OUTPATIENT
Start: 2025-01-23 | End: 2025-02-06

## 2025-01-23 ASSESSMENT — ENCOUNTER SYMPTOMS
SINUS PAIN: 1
SINUS PRESSURE: 1
CONSTIPATION: 0
SORE THROAT: 0
RHINORRHEA: 1
COUGH: 1
WHEEZING: 0
ABDOMINAL PAIN: 0
CHEST TIGHTNESS: 0

## 2025-01-23 ASSESSMENT — PATIENT HEALTH QUESTIONNAIRE - PHQ9
SUM OF ALL RESPONSES TO PHQ QUESTIONS 1-9: 0
SUM OF ALL RESPONSES TO PHQ QUESTIONS 1-9: 0
1. LITTLE INTEREST OR PLEASURE IN DOING THINGS: NOT AT ALL
SUM OF ALL RESPONSES TO PHQ QUESTIONS 1-9: 0
SUM OF ALL RESPONSES TO PHQ QUESTIONS 1-9: 0
2. FEELING DOWN, DEPRESSED OR HOPELESS: NOT AT ALL
SUM OF ALL RESPONSES TO PHQ9 QUESTIONS 1 & 2: 0

## 2025-01-23 NOTE — PROGRESS NOTES
warm.      Findings: No rash.   Neurological:      Mental Status: She is oriented to person, place, and time.         Lab Review   Office Visit on 2025   Component Date Value    SARS-COV-2, POC 2025 Not-Detected     Influenza A Antigen, POC 2025 Positive (A)     Influenza B Antigen, POC 2025 Negative     Vendor and kit name [id number] Veritor     Internal Control 2025 +     Lot/Kit Number [id number]     Lot/Kit  date: 2025            ASSESSMENT/PLAN:      Influenza A    Head congestion    Sinus pressure    Body aches    Acute cough    Fever and chills      Patient diagnosed with influenza A on 2024  Rx for Tamiflu was prescribed on 2024    States she is feeling worse  Still high fever at home yesterday  Unable to sleep related to chills and bodyaches  Coughing  Is using decongestant and no spray and DayQuil and NyQuil    Recommend  Increase fluid intake  Use Flonase  Alternate Tylenol and ibuprofen for fever    RX  -     predniSONE (DELTASONE) 10 MG tablet; Take 1 tablet by mouth daily for 4 days  -     guaiFENesin-codeine (GUAIFENESIN AC) 100-10 MG/5ML liquid; Take 5 mLs by mouth nightly as needed for Cough for up to 14 days. Max Daily Amount: 5 mLs  Work excuse until     If sx not improving and resolving , if sx continue or re-occur pt has been instructed to call us and / or return here for follow- up evaluation          No orders of the defined types were placed in this encounter.    New Prescriptions    GUAIFENESIN-CODEINE (GUAIFENESIN AC) 100-10 MG/5ML LIQUID    Take 5 mLs by mouth nightly as needed for Cough for up to 14 days. Max Daily Amount: 5 mLs    PREDNISONE (DELTASONE) 10 MG TABLET    Take 1 tablet by mouth daily for 4 days         No follow-ups on file.   There are no Patient Instructions on file for this visit.  EMR Dragon/transcription disclaimer:Significant part of this  encounter note is electronic

## 2025-01-28 ENCOUNTER — PATIENT MESSAGE (OUTPATIENT)
Dept: INTERNAL MEDICINE | Age: 45
End: 2025-01-28

## 2025-01-28 RX ORDER — CEFDINIR 300 MG/1
300 CAPSULE ORAL 2 TIMES DAILY
Qty: 14 CAPSULE | Refills: 0 | Status: SHIPPED | OUTPATIENT
Start: 2025-01-28 | End: 2025-02-04

## 2025-01-28 RX ORDER — PREDNISONE 10 MG/1
10 TABLET ORAL DAILY
Qty: 4 TABLET | Refills: 0 | Status: SHIPPED | OUTPATIENT
Start: 2025-01-28 | End: 2025-02-01

## 2025-03-20 ENCOUNTER — RESULTS FOLLOW-UP (OUTPATIENT)
Dept: WOMENS IMAGING | Age: 45
End: 2025-03-20

## 2025-03-20 ENCOUNTER — HOSPITAL ENCOUNTER (OUTPATIENT)
Dept: WOMENS IMAGING | Age: 45
Discharge: HOME | End: 2025-03-20
Payer: COMMERCIAL

## 2025-03-20 DIAGNOSIS — Z12.31 ENCOUNTER FOR SCREENING MAMMOGRAM FOR MALIGNANT NEOPLASM OF BREAST: ICD-10-CM

## 2025-03-20 PROCEDURE — 77063 BREAST TOMOSYNTHESIS BI: CPT

## 2025-03-31 RX ORDER — METOPROLOL SUCCINATE 25 MG/1
TABLET, EXTENDED RELEASE ORAL
Qty: 90 TABLET | Refills: 1 | Status: SHIPPED | OUTPATIENT
Start: 2025-03-31

## 2025-06-12 LAB
CHOLEST SERPL-MCNC: 172 MG/DL (ref 0–199)
GLUCOSE SERPL-MCNC: 92 MG/DL (ref 70–99)
HDLC SERPL-MCNC: 64 MG/DL (ref 40–60)
LDLC SERPL CALC-MCNC: 98 MG/DL
TRIGL SERPL-MCNC: 52 MG/DL (ref 0–149)

## (undated) DEVICE — SINGLE PORT MANIFOLD: Brand: NEPTUNE 2

## (undated) DEVICE — ENDO KIT,LOURDES HOSPITAL: Brand: MEDLINE INDUSTRIES, INC.

## (undated) DEVICE — CANNULA NSL AD L7FT DIV O2 CO2 W/ M LUERLOCK TRMPT CONN